# Patient Record
Sex: MALE | Race: WHITE | NOT HISPANIC OR LATINO | Employment: OTHER | URBAN - METROPOLITAN AREA
[De-identification: names, ages, dates, MRNs, and addresses within clinical notes are randomized per-mention and may not be internally consistent; named-entity substitution may affect disease eponyms.]

---

## 2017-01-04 RX ORDER — LISINOPRIL 5 MG/1
5 TABLET ORAL NIGHTLY
Qty: 90 TABLET | Refills: 1 | Status: SHIPPED | OUTPATIENT
Start: 2017-01-04 | End: 2017-06-28 | Stop reason: SDUPTHER

## 2017-01-04 NOTE — TELEPHONE ENCOUNTER
----- Message from aClly Ortega sent at 1/4/2017  9:26 AM CST -----  Refill: lisinopril (PRINIVIL,ZESTRIL) 5 MG tablet  Refill: ranitidine (ZANTAC) 300 MG tablet    Please send to [facility]. Thank you!

## 2017-01-13 ENCOUNTER — OFFICE VISIT (OUTPATIENT)
Dept: FAMILY MEDICINE | Facility: CLINIC | Age: 67
End: 2017-01-13
Payer: MEDICARE

## 2017-01-13 VITALS
RESPIRATION RATE: 18 BRPM | SYSTOLIC BLOOD PRESSURE: 90 MMHG | DIASTOLIC BLOOD PRESSURE: 76 MMHG | HEIGHT: 73 IN | WEIGHT: 315 LBS | OXYGEN SATURATION: 97 % | BODY MASS INDEX: 41.75 KG/M2 | HEART RATE: 50 BPM | TEMPERATURE: 98 F

## 2017-01-13 DIAGNOSIS — H81.13 BENIGN POSITIONAL VERTIGO, BILATERAL: Primary | ICD-10-CM

## 2017-01-13 DIAGNOSIS — I10 ESSENTIAL HYPERTENSION: ICD-10-CM

## 2017-01-13 PROCEDURE — 99214 OFFICE O/P EST MOD 30 MIN: CPT | Mod: S$PBB,,, | Performed by: FAMILY MEDICINE

## 2017-01-13 PROCEDURE — 99999 PR PBB SHADOW E&M-EST. PATIENT-LVL III: CPT | Mod: PBBFAC,,, | Performed by: FAMILY MEDICINE

## 2017-01-13 PROCEDURE — 99213 OFFICE O/P EST LOW 20 MIN: CPT | Mod: PBBFAC,PO | Performed by: FAMILY MEDICINE

## 2017-01-13 RX ORDER — NIACIN 500 MG/1
500 TABLET, EXTENDED RELEASE ORAL
COMMUNITY
End: 2019-09-19

## 2017-01-13 RX ORDER — HYDROCODONE BITARTRATE AND ACETAMINOPHEN 10; 325 MG/1; MG/1
1 TABLET ORAL
COMMUNITY
Start: 2016-06-03 | End: 2019-09-19

## 2017-01-13 RX ORDER — MECLIZINE HYDROCHLORIDE 25 MG/1
25 TABLET ORAL 3 TIMES DAILY PRN
Qty: 30 TABLET | Refills: 0 | Status: SHIPPED | OUTPATIENT
Start: 2017-01-13 | End: 2019-09-19

## 2017-01-13 RX ORDER — MELOXICAM 15 MG/1
TABLET ORAL
Refills: 2 | COMMUNITY
Start: 2016-12-15 | End: 2019-09-19

## 2017-01-13 RX ORDER — NEBIVOLOL 5 MG/1
5 TABLET ORAL
COMMUNITY
Start: 2013-03-07 | End: 2017-01-13 | Stop reason: ALTCHOICE

## 2017-01-13 NOTE — PROGRESS NOTES
Chief Complaint   Patient presents with    Dizziness    rt ear pain    Neck Pain    Headache       Wale Holiday Umesh is a 66 y.o. male who presents per the Chief Complaint.  Pt is known to me and was last seen by me on 9/23/2016.  All known chronic medical issues have been documented.       Dizziness:   Chronicity:  New  Onset:  In the past 7 days  Progression since onset:  Unchanged  Frequency:  Every few minutes  Pain Scale:  0/10  Severity:  Moderate  Duration:  5 minutes  Dizziness characteristics:  Motion-sickness, sensation of movement and walking on uneven surfaceno hearing loss, no ear congestion, no ear pain, no fever, no headaches, no tinnitus, no nausea, no vomiting, no diaphoresis, no aural fullness, no weakness, no visual disturbances, no light-headedness, no syncope, no palpitations, no panic, no facial weakness, no slurred speech, no numbness in extremities and no chest pain.  Aggravated by:  Position changes  Treatments tried:  Meclizine  Improvements on treatment:  Mildno strokes, no cardiac surgery, no neurologic disease, no head trauma, no balance testing, no ear trauma, no ear surgery, no head trauma, no ear infections, no anxiety, no ear tubes, no environmental allergies, no MRI head and no CT head.       ROS  Review of Systems   Constitutional: Negative.  Negative for activity change, appetite change, chills, diaphoresis, fatigue, fever and unexpected weight change.   HENT: Negative.  Negative for congestion, ear pain, hearing loss, nosebleeds, postnasal drip, rhinorrhea, sinus pressure, sneezing, sore throat, tinnitus and trouble swallowing.    Eyes: Negative for pain and visual disturbance.   Respiratory: Negative for cough, choking and shortness of breath.    Cardiovascular: Negative for chest pain, palpitations, leg swelling and syncope.   Gastrointestinal: Negative for abdominal pain, constipation, diarrhea, nausea and vomiting.   Genitourinary: Negative for difficulty urinating,  "dysuria, frequency and urgency.   Musculoskeletal: Negative.  Negative for arthralgias, back pain, gait problem, joint swelling and myalgias.   Skin: Negative.    Allergic/Immunologic: Negative for environmental allergies and food allergies.   Neurological: Positive for dizziness. Negative for seizures, syncope, weakness, light-headedness and headaches.   Psychiatric/Behavioral: Negative.  Negative for confusion, decreased concentration, dysphoric mood and sleep disturbance. The patient is not nervous/anxious.        Physical Exam  Vitals:    01/13/17 1405   BP: 90/76   Pulse: (!) 50   Resp: 18   Temp: 98 °F (36.7 °C)    Body mass index is 49.27 kg/(m^2).  Weight: (!) 169.4 kg (373 lb 7.4 oz)   Height: 6' 1" (185.4 cm)     Physical Exam   Constitutional: He is oriented to person, place, and time. He appears well-developed and well-nourished. He is active and cooperative.  Non-toxic appearance. He does not have a sickly appearance. He does not appear ill. No distress.   HENT:   Head: Normocephalic and atraumatic.   Right Ear: Hearing and external ear normal. No decreased hearing is noted.   Left Ear: Hearing and external ear normal. No decreased hearing is noted.   Nose: Nose normal. No rhinorrhea or nasal deformity.   Mouth/Throat: Uvula is midline and oropharynx is clear and moist. He does not have dentures. Normal dentition.   Eyes: Conjunctivae, EOM and lids are normal. Pupils are equal, round, and reactive to light. Right eye exhibits no chemosis, no discharge and no exudate. No foreign body present in the right eye. Left eye exhibits no chemosis, no discharge and no exudate. No foreign body present in the left eye. No scleral icterus.   Neck: Normal range of motion and full passive range of motion without pain. Neck supple.   Cardiovascular: Normal rate, regular rhythm, S1 normal, S2 normal and normal heart sounds.  Exam reveals no gallop and no friction rub.    No murmur heard.  Pulmonary/Chest: Effort normal " and breath sounds normal. No accessory muscle usage. No respiratory distress. He has no decreased breath sounds. He has no wheezes. He has no rhonchi. He has no rales.   Abdominal: Soft. Normal appearance. He exhibits no distension. There is no hepatosplenomegaly. There is no tenderness. There is no rigidity, no rebound and no guarding.   Musculoskeletal: Normal range of motion.   Neurological: He is alert and oriented to person, place, and time. He has normal strength. No cranial nerve deficit or sensory deficit. He exhibits normal muscle tone. He displays no seizure activity. Coordination and gait normal.   Skin: Skin is warm, dry and intact. No rash noted. He is not diaphoretic.   Psychiatric: He has a normal mood and affect. His speech is normal and behavior is normal. Judgment and thought content normal. Cognition and memory are normal. He is attentive.       Assessment & Plan    1. Benign positional vertigo, bilateral  Likely BPV; discussed continued use of meclizine for acute symptom relief and home therapy.  Gave handout explaining Dix-Hallpike-Epley maneuver to alleviate symptoms.   - meclizine (ANTIVERT) 25 mg tablet; Take 1 tablet (25 mg total) by mouth 3 (three) times daily as needed.  Dispense: 30 tablet; Refill: 0    2. Essential hypertension  Patient was counseled and encouraged to maintain a low sodium diet, as well as increasing physical activity.  Recommend random BP checks at home on a regular basis.  Will continue medication at this time, and follow up as recommended, or sooner if blood pressure is not well controlled.       Follow up documented    ACTIVE MEDICAL ISSUES:  Documented in Problem List    PAST MEDICAL HISTORY  Documented    PAST SURGICAL HISTORY:  Documented    SOCIAL HISTORY:  Documented    FAMILY HISTORY:  Documented    ALLERGIES AND MEDICATIONS: updated and reviewed.  Documented    Health Maintenance       Date Due Completion Date    TETANUS VACCINE 9/6/1969 ---    Influenza  Vaccine 8/1/2016 11/11/2014    Override on 11/11/2014: Done (today)    Pneumococcal (65+) (1 of 2 - PCV13) 9/6/2016 11/11/2014    Hemoglobin A1c 5/1/2017 11/1/2016    Override on 11/11/2014: Done (future)    Eye Exam 7/1/2017 7/1/2016    Override on 11/11/2014: Done (future)    Foot Exam 9/23/2017 9/23/2016    Override on 6/10/2015: Done (today)    Lipid Panel 12/29/2017 12/29/2016    Colonoscopy 11/10/2025 11/10/2015 (Done)    Override on 11/10/2015: Done    Override on 11/11/2014: Declined (had one done not due until couples more years)

## 2017-01-13 NOTE — LETTER
January 13, 2017    Wale Calvo  [address]  [place] [address]             [facility] [address] [place] [address]  Phone: [phone]  Fax: [phone] Wale Calvo  was seen in my clinic on 01/13/2017.  Please excuse his absence for massage due to illness.  Thank you.    If you have any questions or concerns, please don't hesitate to call.    Sincerely,             Azikiwe K. Lombard, MD

## 2017-02-01 ENCOUNTER — TELEPHONE (OUTPATIENT)
Dept: FAMILY MEDICINE | Facility: CLINIC | Age: 67
End: 2017-02-01

## 2017-02-01 NOTE — TELEPHONE ENCOUNTER
----- Message from Cinthya Lazcano sent at 2/1/2017 11:11 AM CST -----  Contact: self  Pt is requesting a refill for blood sugar diagnostic (CONTOUR NEXT STRIPS) Strp. [phone]

## 2017-02-01 NOTE — TELEPHONE ENCOUNTER
Patient stated that he was told by the pharmacy that his rx for his strips has  I called pharmacy to confirm since in 2016 patient was given 11 refills, per pharmacy a form need to be completed to send to insurance for approval, pharmacy will send form to be filled out. Patient notified

## 2017-02-13 ENCOUNTER — TELEPHONE (OUTPATIENT)
Dept: FAMILY MEDICINE | Facility: CLINIC | Age: 67
End: 2017-02-13

## 2017-02-13 NOTE — TELEPHONE ENCOUNTER
----- Message from Andrei Rivas sent at 2/13/2017  3:00 PM CST -----  Pt requesting call from Dr. Lombard wouldn't state why.

## 2017-02-13 NOTE — TELEPHONE ENCOUNTER
Patient stated that he his blowing green mucous and stated Dr.Lombard usually Rx a Zpak and a cortisone dago, patient requesting scripts, please advise

## 2017-02-16 ENCOUNTER — OFFICE VISIT (OUTPATIENT)
Dept: FAMILY MEDICINE | Facility: CLINIC | Age: 67
End: 2017-02-16
Payer: MEDICARE

## 2017-02-16 VITALS
DIASTOLIC BLOOD PRESSURE: 64 MMHG | BODY MASS INDEX: 41.75 KG/M2 | WEIGHT: 315 LBS | SYSTOLIC BLOOD PRESSURE: 108 MMHG | RESPIRATION RATE: 18 BRPM | HEART RATE: 90 BPM | TEMPERATURE: 98 F | OXYGEN SATURATION: 96 % | HEIGHT: 73 IN

## 2017-02-16 DIAGNOSIS — J18.9 ATYPICAL PNEUMONIA: Primary | ICD-10-CM

## 2017-02-16 PROCEDURE — 99213 OFFICE O/P EST LOW 20 MIN: CPT | Mod: S$PBB,,, | Performed by: FAMILY MEDICINE

## 2017-02-16 PROCEDURE — 99999 PR PBB SHADOW E&M-EST. PATIENT-LVL III: CPT | Mod: PBBFAC,,, | Performed by: FAMILY MEDICINE

## 2017-02-16 PROCEDURE — 99213 OFFICE O/P EST LOW 20 MIN: CPT | Mod: PBBFAC,PO | Performed by: FAMILY MEDICINE

## 2017-02-16 RX ORDER — METHYLPREDNISOLONE 4 MG/1
TABLET ORAL
Qty: 1 PACKAGE | Refills: 0 | Status: SHIPPED | OUTPATIENT
Start: 2017-02-16 | End: 2018-03-20 | Stop reason: ALTCHOICE

## 2017-02-16 RX ORDER — AZITHROMYCIN 250 MG/1
TABLET, FILM COATED ORAL
Qty: 6 TABLET | Refills: 0 | Status: SHIPPED | OUTPATIENT
Start: 2017-02-16 | End: 2017-06-28 | Stop reason: ALTCHOICE

## 2017-02-16 NOTE — PROGRESS NOTES
Chief Complaint   Patient presents with    Cough    URI       Wale Holkp Calvo is a 66 y.o. male who presents per the Chief Complaint.  Pt is known to me and was last seen by me on 1/13/2017.  All known chronic medical issues have been documented.       Cough   This is a new problem. The current episode started more than 1 month ago. The problem has been unchanged. The problem occurs every few minutes. The cough is productive of sputum. Associated symptoms include chills, a fever, postnasal drip, rhinorrhea, a sore throat and shortness of breath. Pertinent negatives include no chest pain, ear congestion, ear pain, eye redness, headaches, heartburn, hemoptysis, myalgias, nasal congestion, rash, sweats, weight loss or wheezing. Nothing aggravates the symptoms. He has tried OTC cough suppressant for the symptoms. There is no history of asthma, bronchiectasis, bronchitis, COPD, emphysema, environmental allergies or pneumonia.   URI    This is a new problem. The current episode started more than 1 month ago. The problem has been unchanged. The maximum temperature recorded prior to his arrival was 100.4 - 100.9 F. Associated symptoms include congestion, coughing, rhinorrhea and a sore throat. Pertinent negatives include no abdominal pain, chest pain, diarrhea, dysuria, ear pain, headaches, joint pain, joint swelling, nausea, neck pain, plugged ear sensation, rash, sinus pain, sneezing, swollen glands, vomiting or wheezing. He has tried acetaminophen, antihistamine and NSAIDs for the symptoms. The treatment provided mild relief.        ROS  Review of Systems   Constitutional: Positive for chills and fever. Negative for weight loss.   HENT: Positive for congestion, postnasal drip, rhinorrhea and sore throat. Negative for dental problem, drooling, ear discharge, ear pain, facial swelling, hearing loss, mouth sores, sinus pressure, sneezing and trouble swallowing.    Eyes: Negative for pain, discharge and redness.  "  Respiratory: Positive for cough and shortness of breath. Negative for hemoptysis, chest tightness and wheezing.    Cardiovascular: Negative for chest pain.   Gastrointestinal: Negative for abdominal pain, constipation, diarrhea, heartburn, nausea and vomiting.   Genitourinary: Negative for difficulty urinating, dysuria, flank pain and urgency.   Musculoskeletal: Negative.  Negative for arthralgias, joint pain, myalgias, neck pain and neck stiffness.   Skin: Negative.  Negative for rash.   Allergic/Immunologic: Negative for environmental allergies, food allergies and immunocompromised state.   Neurological: Negative.  Negative for dizziness, weakness, light-headedness and headaches.   Psychiatric/Behavioral: Positive for sleep disturbance.       Physical Exam  Vitals:    02/16/17 1046   BP: 108/64   Pulse: 90   Resp: 18   Temp: 98.2 °F (36.8 °C)    Body mass index is 49.01 kg/(m^2).  Weight: (!) 168.5 kg (371 lb 7.6 oz)   Height: 6' 1" (185.4 cm)     Physical Exam   Constitutional: He appears well-developed and well-nourished. He is cooperative.  Non-toxic appearance. He does not have a sickly appearance. He does not appear ill. No distress.   HENT:   Head: Normocephalic and atraumatic.   Right Ear: Hearing, external ear and ear canal normal. No tenderness. No foreign bodies. No mastoid tenderness. Tympanic membrane is bulging. Tympanic membrane is not injected, not scarred, not perforated, not erythematous and not retracted. No decreased hearing is noted.   Left Ear: Hearing, external ear and ear canal normal. No tenderness. No foreign bodies. No mastoid tenderness. Tympanic membrane is bulging. Tympanic membrane is not injected, not scarred, not perforated, not erythematous and not retracted. No decreased hearing is noted.   Nose: Mucosal edema and rhinorrhea present. No nose lacerations, sinus tenderness, nasal deformity, septal deviation or nasal septal hematoma. No epistaxis. Right sinus exhibits no maxillary " sinus tenderness and no frontal sinus tenderness. Left sinus exhibits no maxillary sinus tenderness and no frontal sinus tenderness.   Mouth/Throat: Uvula is midline and mucous membranes are normal. He does not have dentures. No oral lesions. No dental abscesses or dental caries. Posterior oropharyngeal erythema present. No oropharyngeal exudate, posterior oropharyngeal edema or tonsillar abscesses.   Eyes: Conjunctivae and EOM are normal. Pupils are equal, round, and reactive to light. Right eye exhibits no chemosis, no discharge and no exudate. No foreign body present in the right eye. Left eye exhibits no chemosis, no discharge and no exudate. No foreign body present in the left eye. No scleral icterus.   Neck: Normal range of motion and full passive range of motion without pain. No rigidity.   Cardiovascular: Normal rate, regular rhythm, S1 normal, S2 normal and normal heart sounds.  Exam reveals no gallop and no friction rub.    No murmur heard.  Pulmonary/Chest: Effort normal and breath sounds normal. He has no decreased breath sounds. He has no wheezes. He has no rhonchi. He has no rales.   Lymphadenopathy:        Head (right side): No submental, no submandibular, no tonsillar, no preauricular and no posterior auricular adenopathy present.        Head (left side): No submental, no submandibular, no tonsillar, no preauricular and no posterior auricular adenopathy present.   Neurological: He is alert.       Assessment & Plan    1. Atypical pneumonia  Prolonged URI symptoms not resolved with OTC treatment.  Will start oral steroid and short course of antibiotics for symptom relief.  - azithromycin (Z-CRISTO) 250 MG tablet; Take 2 tablets by mouth on day 1; Take 1 tablet by mouth on days 2-5  Dispense: 6 tablet; Refill: 0  - methylPREDNISolone (MEDROL DOSEPACK) 4 mg tablet; use as directed  Dispense: 1 Package; Refill: 0      Follow up documented    ACTIVE MEDICAL ISSUES:  Documented in Problem List    PAST MEDICAL  HISTORY  Documented    PAST SURGICAL HISTORY:  Documented    SOCIAL HISTORY:  Documented    FAMILY HISTORY:  Documented    ALLERGIES AND MEDICATIONS: updated and reviewed.  Documented    Health Maintenance       Date Due Completion Date    TETANUS VACCINE 9/6/1969 ---    Influenza Vaccine 8/1/2016 11/11/2014    Override on 11/11/2014: Done (today)    Pneumococcal (65+) (1 of 2 - PCV13) 9/6/2016 11/11/2014    Hemoglobin A1c 5/1/2017 11/1/2016    Override on 11/11/2014: Done (future)    Eye Exam 7/1/2017 7/1/2016    Override on 11/11/2014: Done (future)    Foot Exam 9/23/2017 9/23/2016    Override on 6/10/2015: Done (today)    Lipid Panel 12/29/2017 12/29/2016    Colonoscopy 11/10/2025 11/10/2015 (Done)    Override on 11/10/2015: Done    Override on 11/11/2014: Declined (had one done not due until couples more years)

## 2017-03-08 ENCOUNTER — TELEPHONE (OUTPATIENT)
Dept: FAMILY MEDICINE | Facility: CLINIC | Age: 67
End: 2017-03-08

## 2017-03-08 NOTE — TELEPHONE ENCOUNTER
Informed Ayesha/[facility] that the form has been received in the office, but is not yet signed.  Will have Dr. Lombard sign so that it may be re-faxed.      Ann requesting to have a copy faxed to the local store and Medicare office.

## 2017-03-08 NOTE — TELEPHONE ENCOUNTER
----- Message from Quinton Avelar sent at 3/8/2017 10:21 AM CST -----  Contact: Ayesha/[facility]/[phone]  Ayesha would like to know if the staff received a CMN Form regarding the patient's diabetic supplies. Thank you.

## 2017-03-10 ENCOUNTER — TELEPHONE (OUTPATIENT)
Dept: FAMILY MEDICINE | Facility: CLINIC | Age: 67
End: 2017-03-10

## 2017-03-10 NOTE — TELEPHONE ENCOUNTER
----- Message from Clare Marinelli sent at 3/10/2017  8:21 AM CST -----  Contact: Pharmacy  Pt is completely out of diabetic supplies. Please fill out CMN form and send to pharmacy

## 2017-03-29 ENCOUNTER — DOCUMENTATION ONLY (OUTPATIENT)
Dept: RESEARCH | Facility: HOSPITAL | Age: 67
End: 2017-03-29

## 2017-03-29 NOTE — RESEARCH
Documentation of Informed Consent Obtained for Research by Non-[facility] Personnel    Study: PROmoting Successful Weight Loss in Primary CarE in [place] (PROP)  IRB Number: #[id number]; [facility] IRB ID: [id number]  : Reid Escalante, PhD.  Coordinating Site: [facility] Co-Investigators: Corina Saldaña MD and Luis Dukes MD  [facility]: 11[facility]/15  This study is reviewed and acknowledged by the [facility] IRB. The IRB of Record is the [facility] ([facility]) IRB.    Is the volunteer currently enrolled in any other research trials (per Epic)? [ ] Yes  [X] No  [facility] staff administering informed consent: Floridalma Tran  Date:   2/13/2017  Does the volunteer agree to participate in the trial? [X] Yes  [ ] No  If no, document the reason here:       [X] I acknowledge that I have reviewed the informed consent form and the volunteer signed and dated the signature section of the consent forms.    Name: ([facility] personnel reviewing consent form):           Eugene Massey  Review and Scan Date (if different than date of note): 3/22/17, 3/29/2017  Comments: See  for Consent Forms    [facility]-trained recruiters will obtain informed consent form all participants as well as HIPAA authorization.  Study protocol states all questions and concerns are clarified before any forms are signed. The following elements of the informed consent document were to be discussed:  · What you should know about a research study (e.g. purpose of the consent form; right to refuse or agree and change your mind later; participation is voluntary)?  · Who is doing the study?    · Where is the study being conducted?    · What is the purpose of this study?  · Who is eligible to participate in the study?     · What will happen to you if you take part in the study (e.g. Screening, Measurement visits, Lifestyle change meetings activities)?  · What  are the possible risks and discomforts? Benefits?  · If you do not want to take part in the study, are there other choices?  · If you have any questions or problems, whom can you call?  · What information will be kept private?  · Can your taking part in the study end early?  · What if information becomes available that might affect your decision to stay in the study?  · What charges will you have to pay? What payment will you receive?  · Will you be compensated for a study-related injury or medical illness?

## 2017-05-17 NOTE — TELEPHONE ENCOUNTER
----- Message from Hoa Kathleen sent at 5/17/2017  1:48 PM CDT -----  Contact: self  REFILL: ranitidine (ZANTAC) 300 MG tablet    PLEASE SEND TO [facility] IN [place], [place]  [phone](PHONE)

## 2017-05-26 DIAGNOSIS — E11.9 TYPE 2 DIABETES MELLITUS WITHOUT COMPLICATION: ICD-10-CM

## 2017-06-28 ENCOUNTER — OFFICE VISIT (OUTPATIENT)
Dept: FAMILY MEDICINE | Facility: CLINIC | Age: 67
End: 2017-06-28
Payer: MEDICARE

## 2017-06-28 ENCOUNTER — LAB VISIT (OUTPATIENT)
Dept: LAB | Facility: HOSPITAL | Age: 67
End: 2017-06-28
Attending: FAMILY MEDICINE
Payer: MEDICARE

## 2017-06-28 VITALS
OXYGEN SATURATION: 97 % | BODY MASS INDEX: 41.75 KG/M2 | HEART RATE: 52 BPM | WEIGHT: 315 LBS | HEIGHT: 73 IN | SYSTOLIC BLOOD PRESSURE: 108 MMHG | RESPIRATION RATE: 17 BRPM | DIASTOLIC BLOOD PRESSURE: 76 MMHG | TEMPERATURE: 98 F

## 2017-06-28 DIAGNOSIS — I25.10 CORONARY ARTERY DISEASE INVOLVING NATIVE CORONARY ARTERY WITHOUT ANGINA PECTORIS, UNSPECIFIED WHETHER NATIVE OR TRANSPLANTED HEART: ICD-10-CM

## 2017-06-28 DIAGNOSIS — Z23 NEED FOR PNEUMOCOCCAL VACCINATION: ICD-10-CM

## 2017-06-28 DIAGNOSIS — E66.01 MORBID OBESITY WITH BMI OF 40.0-44.9, ADULT: ICD-10-CM

## 2017-06-28 DIAGNOSIS — I10 ESSENTIAL HYPERTENSION: ICD-10-CM

## 2017-06-28 DIAGNOSIS — E11.9 TYPE 2 DIABETES MELLITUS WITHOUT COMPLICATION, WITHOUT LONG-TERM CURRENT USE OF INSULIN: Primary | ICD-10-CM

## 2017-06-28 DIAGNOSIS — E78.00 PURE HYPERCHOLESTEROLEMIA: ICD-10-CM

## 2017-06-28 DIAGNOSIS — E11.9 TYPE 2 DIABETES MELLITUS WITHOUT COMPLICATION: ICD-10-CM

## 2017-06-28 PROCEDURE — 99999 PR PBB SHADOW E&M-EST. PATIENT-LVL III: CPT | Mod: PBBFAC,,, | Performed by: FAMILY MEDICINE

## 2017-06-28 PROCEDURE — 36415 COLL VENOUS BLD VENIPUNCTURE: CPT | Mod: PO

## 2017-06-28 PROCEDURE — 83036 HEMOGLOBIN GLYCOSYLATED A1C: CPT

## 2017-06-28 PROCEDURE — 99214 OFFICE O/P EST MOD 30 MIN: CPT | Mod: S$PBB,,, | Performed by: FAMILY MEDICINE

## 2017-06-28 PROCEDURE — 4010F ACE/ARB THERAPY RXD/TAKEN: CPT | Mod: ,,, | Performed by: FAMILY MEDICINE

## 2017-06-28 PROCEDURE — 3044F HG A1C LEVEL LT 7.0%: CPT | Mod: ,,, | Performed by: FAMILY MEDICINE

## 2017-06-28 RX ORDER — LISINOPRIL 5 MG/1
5 TABLET ORAL NIGHTLY
Qty: 90 TABLET | Refills: 1 | Status: SHIPPED | OUTPATIENT
Start: 2017-06-28 | End: 2018-03-12 | Stop reason: SDUPTHER

## 2017-06-28 RX ORDER — ATORVASTATIN CALCIUM 40 MG/1
40 TABLET, FILM COATED ORAL NIGHTLY
Qty: 90 TABLET | Refills: 1 | Status: SHIPPED | OUTPATIENT
Start: 2017-06-28 | End: 2018-02-20 | Stop reason: SDUPTHER

## 2017-06-28 RX ORDER — CLOPIDOGREL BISULFATE 75 MG/1
75 TABLET ORAL DAILY
Qty: 90 TABLET | Refills: 1 | Status: SHIPPED | OUTPATIENT
Start: 2017-06-28 | End: 2018-03-20 | Stop reason: SDUPTHER

## 2017-06-28 NOTE — PROGRESS NOTES
No chief complaint on file.      Wale Calvo is a 66 y.o. male who presents per the Chief Complaint.  Pt is known to me and was last seen by me on 2/16/2017.  All known chronic medical issues have been documented.       Diabetes   He presents for his follow-up diabetic visit. He has gestational diabetes mellitus. No MedicAlert identification noted. The initial diagnosis of diabetes was made 5 years ago. His disease course has been stable. There are no hypoglycemic associated symptoms. Pertinent negatives for hypoglycemia include no confusion, dizziness, headaches, hunger, nervousness/anxiousness, pallor, seizures, sleepiness, sweats or tremors. Associated symptoms include foot paresthesias and weight loss. Pertinent negatives for diabetes include no blurred vision, no chest pain, no fatigue, no foot ulcerations, no polydipsia, no polyphagia, no polyuria, no visual change and no weakness. There are no hypoglycemic complications. Symptoms are stable. Diabetic complications include peripheral neuropathy. Pertinent negatives for diabetic complications include no autonomic neuropathy, CVA, heart disease, impotence, nephropathy, PVD or retinopathy. Risk factors for coronary artery disease include diabetes mellitus. Current diabetic treatment includes oral agent (dual therapy). He is compliant with treatment all of the time. His weight is decreasing rapidly. He is following a generally healthy diet. When asked about meal planning, he reported none. He has had a previous visit with a dietitian. He participates in exercise three times a week. He monitors blood glucose at home 1-2 x per day. Blood glucose monitoring compliance is excellent. His home blood glucose trend is decreasing steadily. An ACE inhibitor/angiotensin II receptor blocker is being taken. He does not see a podiatrist.Eye exam is current.   Hypertension   This is a chronic problem. The current episode started more than 1 year ago. The problem is  unchanged. The problem is controlled. Pertinent negatives include no anxiety, blurred vision, chest pain, headaches, malaise/fatigue, neck pain, orthopnea, palpitations, peripheral edema, PND, shortness of breath or sweats. There are no associated agents to hypertension. Risk factors for coronary artery disease include diabetes mellitus, dyslipidemia, male gender and obesity. Past treatments include calcium channel blockers and beta blockers. The current treatment provides moderate improvement. Compliance problems include exercise and diet.  There is no history of angina, kidney disease, CAD/MI, CVA, heart failure, left ventricular hypertrophy, PVD, renovascular disease, retinopathy or a thyroid problem. There is no history of chronic renal disease, coarctation of the aorta, hyperaldosteronism, hyperparathyroidism, a hypertension causing med, pheochromocytoma or sleep apnea.        ROS  Review of Systems   Constitutional: Positive for weight loss. Negative for activity change, appetite change, chills, diaphoresis, fatigue, fever, malaise/fatigue and unexpected weight change.   HENT: Negative.  Negative for congestion, ear pain, hearing loss, nosebleeds, postnasal drip, rhinorrhea, sinus pressure, sneezing, sore throat and trouble swallowing.    Eyes: Negative for blurred vision, pain and visual disturbance.   Respiratory: Negative for cough, choking and shortness of breath.    Cardiovascular: Negative for chest pain, palpitations, orthopnea, leg swelling and PND.   Gastrointestinal: Negative for abdominal pain, constipation, diarrhea, nausea and vomiting.   Endocrine: Negative for polydipsia, polyphagia and polyuria.   Genitourinary: Negative for difficulty urinating, dysuria, frequency, impotence and urgency.   Musculoskeletal: Negative.  Negative for arthralgias, back pain, gait problem, joint swelling, myalgias and neck pain.   Skin: Negative.  Negative for pallor.   Allergic/Immunologic: Negative for  "environmental allergies and food allergies.   Neurological: Negative.  Negative for dizziness, tremors, seizures, syncope, weakness, light-headedness and headaches.   Psychiatric/Behavioral: Negative.  Negative for confusion, decreased concentration, dysphoric mood and sleep disturbance. The patient is not nervous/anxious.        Physical Exam  Vitals:    06/28/17 1034   BP: 108/76   Pulse: (!) 52   Resp: 17   Temp: 97.9 °F (36.6 °C)    Body mass index is 44.24 kg/m².  Weight: (!) 152.1 kg (335 lb 5.1 oz)   Height: 6' 1" (185.4 cm)     Physical Exam   Constitutional: He is oriented to person, place, and time. He appears well-developed and well-nourished. He is active and cooperative.  Non-toxic appearance. He does not have a sickly appearance. He does not appear ill. No distress.   HENT:   Head: Normocephalic and atraumatic.   Right Ear: Hearing and external ear normal. No decreased hearing is noted.   Left Ear: Hearing and external ear normal. No decreased hearing is noted.   Nose: Nose normal. No rhinorrhea or nasal deformity.   Mouth/Throat: Uvula is midline and oropharynx is clear and moist. He does not have dentures. Normal dentition.   Eyes: Conjunctivae, EOM and lids are normal. Pupils are equal, round, and reactive to light. Right eye exhibits no chemosis, no discharge and no exudate. No foreign body present in the right eye. Left eye exhibits no chemosis, no discharge and no exudate. No foreign body present in the left eye. No scleral icterus.   Neck: Normal range of motion and full passive range of motion without pain. Neck supple.   Cardiovascular: Normal rate, regular rhythm, S1 normal, S2 normal and normal heart sounds.  Exam reveals no gallop and no friction rub.    No murmur heard.  Pulmonary/Chest: Effort normal and breath sounds normal. No accessory muscle usage. No respiratory distress. He has no decreased breath sounds. He has no wheezes. He has no rhonchi. He has no rales.   Abdominal: Soft. " Normal appearance. He exhibits no distension. There is no hepatosplenomegaly. There is no tenderness. There is no rigidity, no rebound and no guarding.   Musculoskeletal: Normal range of motion.   Neurological: He is alert and oriented to person, place, and time. He has normal strength. No cranial nerve deficit or sensory deficit. He exhibits normal muscle tone. He displays no seizure activity. Coordination and gait normal.   Skin: Skin is warm, dry and intact. No rash noted. He is not diaphoretic.   Psychiatric: He has a normal mood and affect. His speech is normal and behavior is normal. Judgment and thought content normal. Cognition and memory are normal. He is attentive.       Assessment & Plan    1. Type 2 diabetes mellitus without complication, without long-term current use of insulin  Patient is encouraged to continue a diet low in carbohydrates and simple sugars.  Advised to begin or continue a weight loss program which focuses on good food choices and increased physical activity and encouraged to adhere to medication regimen and check glucose as recommended daily and report any changes in usual trends.  Screening blood test is due at this time.  Foot exam was not done at this visit.  Patient has been controlling glucose levels with diet and weight.    2. Coronary artery disease involving native coronary artery without angina pectoris, unspecified whether native or transplanted heart  Stable; no therapeutic changes at this time.  - clopidogrel (PLAVIX) 75 mg tablet; Take 1 tablet (75 mg total) by mouth once daily.  Dispense: 90 tablet; Refill: 1    3. Essential hypertension  Patient was counseled and encouraged to maintain a low sodium diet, as well as increasing physical activity.  Recommend random BP checks at home on a regular basis.  Repeat BP at end of visit was not necessary. Will continue medication at this time, and follow up in 3 months, or sooner if blood pressure is not well controlled.     -  lisinopril (PRINIVIL,ZESTRIL) 5 MG tablet; Take 1 tablet (5 mg total) by mouth every evening.  Dispense: 90 tablet; Refill: 1    4. Pure hypercholesterolemia  We discussed ways to manage cholesterol levels, including increasing whole grain foods and decreasing fried and fatty foods.  I also recommended OTC Omega 3 and Omega 6 supplements to improve overall cholesterol levels.  Will continue current therapy at this visit; patient is not due for screening blood test at this time.   - atorvastatin (LIPITOR) 40 MG tablet; Take 1 tablet (40 mg total) by mouth every evening.  Dispense: 90 tablet; Refill: 1    5. Morbid obesity with BMI of 40.0-44.9, adult  We discussed weight and safe, effective ways of losing pounds, including low carbohydrate, low fat diet and increasing physical activity to improve cardiovascular performance and increase metabolism.  Patient was encouraged to set realistic attainable goals for weight loss, and we will follow up periodically.  Is     6. Need for pneumococcal vaccination  Patient due for pneumonia vaccine; Patient agreed and vaccine was administered in clinic today without complications.   - Pneumococcal Conjugate Vaccine (13 Valent) (IM)      Follow up documented    ACTIVE MEDICAL ISSUES:  Documented in Problem List    PAST MEDICAL HISTORY  Documented    PAST SURGICAL HISTORY:  Documented    SOCIAL HISTORY:  Documented    FAMILY HISTORY:  Documented    ALLERGIES AND MEDICATIONS: updated and reviewed.  Documented    Health Maintenance       Date Due Completion Date    TETANUS VACCINE 09/06/1969 ---    Pneumococcal (65+) (1 of 2 - PCV13) 09/06/2016 11/11/2014    Hemoglobin A1c 05/01/2017 11/1/2016    Override on 11/11/2014: Done (future)    Eye Exam 07/01/2017 7/1/2016    Override on 11/11/2014: Done (future)    Influenza Vaccine 08/01/2017 11/11/2014    Override on 11/11/2014: Done (today)    Foot Exam 09/23/2017 9/23/2016    Override on 6/10/2015: Done (today)    Lipid Panel 12/29/2017  12/29/2016    Colonoscopy 11/10/2025 11/10/2015 (Done)    Override on 11/10/2015: Done    Override on 11/11/2014: Declined (had one done not due until couples more years)

## 2017-06-29 LAB
ESTIMATED AVG GLUCOSE: 108 MG/DL
HBA1C MFR BLD HPLC: 5.4 %

## 2017-07-04 DIAGNOSIS — E78.5 HYPERLIPIDEMIA, UNSPECIFIED HYPERLIPIDEMIA TYPE: ICD-10-CM

## 2017-07-06 RX ORDER — CLOPIDOGREL BISULFATE 75 MG/1
TABLET ORAL
Qty: 90 TABLET | Refills: 0 | Status: SHIPPED | OUTPATIENT
Start: 2017-07-06 | End: 2018-03-20 | Stop reason: SDUPTHER

## 2017-08-16 ENCOUNTER — HOSPITAL ENCOUNTER (EMERGENCY)
Facility: HOSPITAL | Age: 67
Discharge: HOME | End: 2017-08-17
Attending: EMERGENCY MEDICINE
Payer: MEDICARE

## 2017-08-16 DIAGNOSIS — Z23 NEED FOR TETANUS BOOSTER: ICD-10-CM

## 2017-08-16 DIAGNOSIS — S61.219A LACERATION OF FINGER: Primary | ICD-10-CM

## 2017-08-16 PROCEDURE — 12042 INTMD RPR N-HF/GENIT2.6-7.5: CPT

## 2017-08-16 PROCEDURE — 99283 EMERGENCY DEPT VISIT LOW MDM: CPT | Mod: 25

## 2017-08-16 RX ORDER — LIDOCAINE HYDROCHLORIDE 10 MG/ML
10 INJECTION INFILTRATION; PERINEURAL
Status: COMPLETED | OUTPATIENT
Start: 2017-08-17 | End: 2017-08-17

## 2017-08-16 RX ORDER — HYDROCODONE BITARTRATE AND ACETAMINOPHEN 5; 325 MG/1; MG/1
1 TABLET ORAL
Status: COMPLETED | OUTPATIENT
Start: 2017-08-17 | End: 2017-08-17

## 2017-08-17 VITALS
TEMPERATURE: 98 F | BODY MASS INDEX: 41.35 KG/M2 | DIASTOLIC BLOOD PRESSURE: 68 MMHG | HEART RATE: 52 BPM | OXYGEN SATURATION: 98 % | RESPIRATION RATE: 18 BRPM | WEIGHT: 312 LBS | HEIGHT: 73 IN | SYSTOLIC BLOOD PRESSURE: 134 MMHG

## 2017-08-17 PROCEDURE — 90715 TDAP VACCINE 7 YRS/> IM: CPT | Performed by: NURSE PRACTITIONER

## 2017-08-17 PROCEDURE — 90471 IMMUNIZATION ADMIN: CPT | Performed by: NURSE PRACTITIONER

## 2017-08-17 PROCEDURE — 63600175 PHARM REV CODE 636 W HCPCS: Performed by: NURSE PRACTITIONER

## 2017-08-17 PROCEDURE — 25000003 PHARM REV CODE 250: Performed by: NURSE PRACTITIONER

## 2017-08-17 RX ADMIN — BACITRACIN, NEOMYCIN, POLYMYXIN B 1 EACH: 400; 3.5; 5 OINTMENT TOPICAL at 01:08

## 2017-08-17 RX ADMIN — CLOSTRIDIUM TETANI TOXOID ANTIGEN (FORMALDEHYDE INACTIVATED), CORYNEBACTERIUM DIPHTHERIAE TOXOID ANTIGEN (FORMALDEHYDE INACTIVATED), BORDETELLA PERTUSSIS TOXOID ANTIGEN (GLUTARALDEHYDE INACTIVATED), BORDETELLA PERTUSSIS FILAMENTOUS HEMAGGLUTININ ANTIGEN (FORMALDEHYDE INACTIVATED), BORDETELLA PERTUSSIS PERTACTIN ANTIGEN, AND BORDETELLA PERTUSSIS FIMBRIAE 2/3 ANTIGEN 0.5 ML: 5; 2; 2.5; 5; 3; 5 INJECTION, SUSPENSION INTRAMUSCULAR at 12:08

## 2017-08-17 RX ADMIN — HYDROCODONE BITARTRATE AND ACETAMINOPHEN 1 TABLET: 5; 325 TABLET ORAL at 01:08

## 2017-08-17 RX ADMIN — LIDOCAINE HYDROCHLORIDE 10 ML: 10 INJECTION, SOLUTION INFILTRATION; PERINEURAL at 12:08

## 2017-08-17 NOTE — DISCHARGE INSTRUCTIONS
Please keep your wound clean and dry.  Wash gently with soap and water and apply antibiotic ointment (bacitracin, neosporin, etc.) over the wound after washing. Please watch for signs of infection including: increased\spreading redness, swelling, pus-like discharge, or a fever greater than 100.4F. If you experience any of these, please contact your Primary Care Doctor or Return to the Emergency Department for a wound check.     Please follow up with your Primary Care Doctor in 7-10 days for wound recheck and suture removal.  You may return to the Emergency Department if you are unable to see your Primary Care Doctor.  Please return to the ER for any new or worsening symptoms.    Please use the splint to help keep the finger straight which will help with healing.

## 2017-08-17 NOTE — ED NOTES
Dressed patient laceration with 2x2 and antibiotics, wrapped with coban per NP. Placed a white finger splint on left 1st digit of hand. Tolerated well

## 2017-08-17 NOTE — ED TRIAGE NOTES
Pt  Was using machete trimming cabanna and cut the left index finger.  Pt used peroxide on the wound before coming to ER

## 2017-08-17 NOTE — ED PROVIDER NOTES
Encounter Date: 8/16/2017    SCRIBE #1 NOTE: I, Keysha Corral, am scribing for, and in the presence of, KEISHA Keenan. Other sections scribed: HPI/ROS.       History     Chief Complaint   Patient presents with    Laceration     Pt accidentially cut left hand of index finger with a machete.  Bleeding is controlled, pt is on Plavix.     CC: Laceration    Pt is a 66 y.o. male w/ DM II, HLD, HTN, CAD s/p stent placement, GERD, ED, and nuclear sclerosis of both eyes presenting to the ED for evaluation following an accidental laceration to the L index finger with a machete around 2000. Pt is on Plavix. Pt is unsure of his last tetanus shot. Pt soaked it with H2O2 and rinsed with H2O. Pt is R hand dominant.  No other treatment attempted prior to arrival. Pt reports no further symptoms.        The history is provided by the patient.     Review of patient's allergies indicates:   Allergen Reactions    Penicillins Anaphylaxis     Past Medical History:   Diagnosis Date    Coronary artery disease     Diabetes mellitus type II     ED (erectile dysfunction)     GERD (gastroesophageal reflux disease)     Hyperlipidemia     Hypertension     Nuclear sclerosis of both eyes 7/1/2016    Obesity      Past Surgical History:   Procedure Laterality Date    CATARACT EXTRACTION W/  INTRAOCULAR LENS IMPLANT Left 08/16/2016    Dr. Martinez    CATARACT EXTRACTION W/  INTRAOCULAR LENS IMPLANT Right 08/30/2016    Dr. Martinez    CORONARY STENT PLACEMENT      JOINT REPLACEMENT      both knees    SPINE SURGERY      discectomy     Family History   Problem Relation Age of Onset    Dementia Mother     Cataracts Mother     Heart disease Father     Alcohol abuse Brother     Drug abuse Daughter     No Known Problems Son     No Known Problems Daughter     No Known Problems Son     No Known Problems Sister     No Known Problems Maternal Aunt     No Known Problems Maternal Uncle     No Known Problems Paternal Aunt     No  Known Problems Paternal Uncle     No Known Problems Maternal Grandmother     No Known Problems Maternal Grandfather     No Known Problems Paternal Grandmother     No Known Problems Paternal Grandfather     Amblyopia Neg Hx     Blindness Neg Hx     Cancer Neg Hx     Diabetes Neg Hx     Glaucoma Neg Hx     Hypertension Neg Hx     Macular degeneration Neg Hx     Retinal detachment Neg Hx     Strabismus Neg Hx     Stroke Neg Hx     Thyroid disease Neg Hx      Social History   Substance Use Topics    Smoking status: Never Smoker    Smokeless tobacco: Never Used    Alcohol use No     Review of Systems   Constitutional: Negative for chills and fever.   HENT: Negative for trouble swallowing.    Eyes: Negative for redness.   Respiratory: Negative for shortness of breath.    Cardiovascular: Negative for chest pain.   Gastrointestinal: Negative for vomiting.   Genitourinary: Negative for dysuria and hematuria.   Musculoskeletal: Negative for arthralgias and myalgias.   Skin: Positive for wound (laceration). Negative for color change and rash.   Psychiatric/Behavioral: Negative for confusion.       Physical Exam     Initial Vitals [08/16/17 2123]   BP Pulse Resp Temp SpO2   (!) 144/67 70 20 98.5 °F (36.9 °C) --      MAP       92.67         Physical Exam    Nursing note and vitals reviewed.  Constitutional: He appears well-developed and well-nourished. He is not diaphoretic. He is cooperative.  Non-toxic appearance. No distress.   HENT:   Head: Normocephalic and atraumatic.   Right Ear: External ear normal.   Left Ear: External ear normal.   Mouth/Throat: Oropharynx is clear and moist.   Eyes: Conjunctivae and EOM are normal.   Neck: Normal range of motion.   Cardiovascular: Normal rate, regular rhythm and intact distal pulses.   Pulses:       Radial pulses are 2+ on the right side, and 2+ on the left side.   Musculoskeletal:        Left hand: Left index finger: Exhibits bleeding (well controlled). Injuries:  laceration (radial aspect). Motor /Testing: Wrist Extension: 5/5. Wrist Flexion: 5/5. : 5/5. Index Finger: 4/5 (2/2 pain). Middle Finger: 5/5. Ring Finger: 5/5. Little Finger: 5/5. Thumb APB: 5/5. Thumb FPL: 5/5. Pinch Mechanism: 5/5.   Neurological: He is alert and oriented to person, place, and time. GCS eye subscore is 4. GCS verbal subscore is 5. GCS motor subscore is 6.   Skin: Skin is warm and dry. Capillary refill takes less than 2 seconds. Laceration noted. No rash noted.   Psychiatric: He has a normal mood and affect. His behavior is normal. Judgment and thought content normal.         ED Course   Lac Repair  Date/Time: 8/17/2017 1:25 AM  Performed by: YUMI BARKSDALE  Authorized by: RONALDO SANCHEZ   Consent Done: Yes  Consent: Verbal consent obtained.  Risks and benefits: risks, benefits and alternatives were discussed  Consent given by: patient  Patient identity confirmed: verbally with patient  Body area: upper extremity  Location details: left index finger  Laceration length: 3 cm  Foreign bodies: no foreign bodies  Anesthesia: digital block    Anesthesia:  Local Anesthetic: lidocaine 1% without epinephrine  Anesthetic total: 3 mL  Patient sedated: no  Irrigation solution: saline  Irrigation method: syringe  Amount of cleaning: extensive  Debridement: none  Degree of undermining: none  Skin closure: 4-0 nylon  Number of sutures: 9  Technique: simple  Approximation: close  Approximation difficulty: simple  Dressing: splint for protection, antibiotic ointment and 4x4 sterile gauze  Patient tolerance: Patient tolerated the procedure well with no immediate complications        Labs Reviewed - No data to display          Medical Decision Making:   Clinical Tests:   Radiological Study: Ordered and Reviewed       APC / Resident Notes:   This is an evaluation of a 66 y.o. male that presents to the Emergency Department for a Laceration. Physical Exam shows a non-toxic, afebrile, and well appearing male.  There is a laceration to radial aspect of the left 2nd digit over the MCP. The laceration is flapped originating from the dorsal portion of the digit. There is no surrounding erythema or area of increased warmth. There is full ROM of the index finger against resistance. Equal sensation to the extremity. No visible tendon lacerations observed on exam.  Tetanus is not up to date. The wound was irrigated and visually inspected prior to closure. No visible foreign bodies noted. Vital Signs Are Reassuring. RESULTS: Xray X-ray of the finger with no evidence of fracture dislocation.  No bony deformities noted. The wound was sutured per the procedure note.     My overall impression is Laceration. I considered, but at this time, do not suspect cellulitis, compartment syndrome, underlying fracture, or suspect any retained foreign body at this time.  He has full range of motion with the finger, I do not suspect tendon injury at this time.     ED Course: Norco, Tdap. D/C Information: Laceration/Wound Care/Suture Removal instructions given. The diagnosis, treatment plan, instructions for follow-up and reevaluation with his PCP or Return to ED in 7-10 days for suture removal as well as ED return precautions were discussed and understanding was verbalized. All questions or concerns have been addressed. This case was discussed with Dr. Temple who is in agreement with my assessment and plan. JONO Wells, KEISHA-C        Scribe Attestation:   Scribe #1: I performed the above scribed service and the documentation accurately describes the services I performed. I attest to the accuracy of the note.    Attending Attestation:           Physician Attestation for Scribe:  Physician Attestation Statement for Scribe #1: I, KEISHA Keenan, reviewed documentation, as scribed by Keysha Corral in my presence, and it is both accurate and complete.                 ED Course     Clinical Impression:   The primary encounter diagnosis was  Laceration of finger. A diagnosis of Need for tetanus booster was also pertinent to this visit.    Disposition:   Disposition: Discharged  Condition: Stable                        Yunior Waite, [facility]  08/17/17 0154

## 2017-08-25 ENCOUNTER — OFFICE VISIT (OUTPATIENT)
Dept: FAMILY MEDICINE | Facility: CLINIC | Age: 67
End: 2017-08-25
Payer: MEDICARE

## 2017-08-25 VITALS
SYSTOLIC BLOOD PRESSURE: 100 MMHG | RESPIRATION RATE: 17 BRPM | OXYGEN SATURATION: 97 % | TEMPERATURE: 98 F | WEIGHT: 313.69 LBS | HEART RATE: 62 BPM | BODY MASS INDEX: 41.57 KG/M2 | HEIGHT: 73 IN | DIASTOLIC BLOOD PRESSURE: 62 MMHG

## 2017-08-25 DIAGNOSIS — E11.9 TYPE 2 DIABETES MELLITUS WITHOUT COMPLICATION, WITHOUT LONG-TERM CURRENT USE OF INSULIN: Primary | ICD-10-CM

## 2017-08-25 DIAGNOSIS — G47.33 OBSTRUCTIVE SLEEP APNEA SYNDROME: ICD-10-CM

## 2017-08-25 DIAGNOSIS — S61.211D LACERATION OF LEFT INDEX FINGER WITHOUT FOREIGN BODY WITHOUT DAMAGE TO NAIL, SUBSEQUENT ENCOUNTER: ICD-10-CM

## 2017-08-25 PROCEDURE — 99213 OFFICE O/P EST LOW 20 MIN: CPT | Mod: PBBFAC,PO | Performed by: FAMILY MEDICINE

## 2017-08-25 PROCEDURE — 99214 OFFICE O/P EST MOD 30 MIN: CPT | Mod: S$PBB,,, | Performed by: FAMILY MEDICINE

## 2017-08-25 PROCEDURE — 3044F HG A1C LEVEL LT 7.0%: CPT | Mod: ,,, | Performed by: FAMILY MEDICINE

## 2017-08-25 PROCEDURE — 3078F DIAST BP <80 MM HG: CPT | Mod: ,,, | Performed by: FAMILY MEDICINE

## 2017-08-25 PROCEDURE — 3074F SYST BP LT 130 MM HG: CPT | Mod: ,,, | Performed by: FAMILY MEDICINE

## 2017-08-25 PROCEDURE — 4010F ACE/ARB THERAPY RXD/TAKEN: CPT | Mod: ,,, | Performed by: FAMILY MEDICINE

## 2017-08-25 PROCEDURE — 99999 PR PBB SHADOW E&M-EST. PATIENT-LVL III: CPT | Mod: PBBFAC,,, | Performed by: FAMILY MEDICINE

## 2017-08-25 NOTE — PROGRESS NOTES
Chief Complaint   Patient presents with    Suture / Staple Removal     lt index     Routine Foot Care       Wale Calvo is a 67 y.o. male who presents per the Chief Complaint.  Pt is known to me and was last seen by me on 6/28/2017.  All known chronic medical issues have been documented.       Diabetes   He presents for his follow-up diabetic visit. He has gestational diabetes mellitus. No MedicAlert identification noted. The initial diagnosis of diabetes was made 5 years ago. His disease course has been stable. There are no hypoglycemic associated symptoms. Pertinent negatives for hypoglycemia include no confusion, dizziness, headaches, hunger, nervousness/anxiousness, pallor, seizures, sleepiness, sweats or tremors. Associated symptoms include foot paresthesias and weight loss. Pertinent negatives for diabetes include no blurred vision, no chest pain, no fatigue, no foot ulcerations, no polydipsia, no polyphagia, no polyuria, no visual change and no weakness. There are no hypoglycemic complications. Symptoms are stable. Diabetic complications include peripheral neuropathy. Pertinent negatives for diabetic complications include no autonomic neuropathy, CVA, heart disease, impotence, nephropathy, PVD or retinopathy. Risk factors for coronary artery disease include diabetes mellitus. Current diabetic treatment includes oral agent (dual therapy). He is compliant with treatment all of the time. His weight is decreasing rapidly. He is following a generally healthy diet. When asked about meal planning, he reported none. He has had a previous visit with a dietitian. He participates in exercise three times a week. He monitors blood glucose at home 1-2 x per day. Blood glucose monitoring compliance is excellent. His home blood glucose trend is decreasing steadily. An ACE inhibitor/angiotensin II receptor blocker is being taken. He does not see a podiatrist.Eye exam is current.        ROS  Review of Systems  "  Constitutional: Positive for weight loss. Negative for activity change, appetite change, chills, diaphoresis, fatigue, fever, malaise/fatigue and unexpected weight change.   HENT: Negative.  Negative for congestion, ear pain, hearing loss, nosebleeds, postnasal drip, rhinorrhea, sinus pressure, sneezing, sore throat and trouble swallowing.    Eyes: Negative for blurred vision, pain and visual disturbance.   Respiratory: Negative for cough, choking and shortness of breath.    Cardiovascular: Negative for chest pain, palpitations, orthopnea, leg swelling and PND.   Gastrointestinal: Negative for abdominal pain, constipation, diarrhea, nausea and vomiting.   Endocrine: Negative for polydipsia, polyphagia and polyuria.   Genitourinary: Negative for difficulty urinating, dysuria, frequency, impotence and urgency.   Musculoskeletal: Negative.  Negative for arthralgias, back pain, gait problem, joint swelling, myalgias and neck pain.   Skin: Positive for wound (cut on left index finger). Negative for pallor.   Allergic/Immunologic: Negative for environmental allergies and food allergies.   Neurological: Negative.  Negative for dizziness, tremors, seizures, syncope, weakness, light-headedness and headaches.   Psychiatric/Behavioral: Negative.  Negative for confusion, decreased concentration, dysphoric mood and sleep disturbance. The patient is not nervous/anxious.        Physical Exam  Vitals:    08/25/17 1500   BP: 100/62   Pulse: 62   Resp: 17   Temp: 97.9 °F (36.6 °C)    Body mass index is 41.39 kg/m².  Weight: (!) 142.3 kg (313 lb 11.4 oz)   Height: 6' 1" (185.4 cm)     Physical Exam   Constitutional: He is oriented to person, place, and time. He appears well-developed and well-nourished. He is active and cooperative.  Non-toxic appearance. He does not have a sickly appearance. He does not appear ill. No distress.   HENT:   Head: Normocephalic and atraumatic.   Right Ear: Hearing and external ear normal. No decreased " hearing is noted.   Left Ear: Hearing and external ear normal. No decreased hearing is noted.   Nose: Nose normal. No rhinorrhea or nasal deformity.   Mouth/Throat: Uvula is midline and oropharynx is clear and moist. He does not have dentures. Normal dentition.   Eyes: Conjunctivae, EOM and lids are normal. Pupils are equal, round, and reactive to light. Right eye exhibits no chemosis, no discharge and no exudate. No foreign body present in the right eye. Left eye exhibits no chemosis, no discharge and no exudate. No foreign body present in the left eye. No scleral icterus.   Neck: Normal range of motion and full passive range of motion without pain. Neck supple.   Cardiovascular: Normal rate, regular rhythm, S1 normal, S2 normal and normal heart sounds.  Exam reveals no gallop and no friction rub.    No murmur heard.  Pulmonary/Chest: Effort normal and breath sounds normal. No accessory muscle usage. No respiratory distress. He has no decreased breath sounds. He has no wheezes. He has no rhonchi. He has no rales.   Abdominal: Soft. Normal appearance. He exhibits no distension. There is no hepatosplenomegaly. There is no tenderness. There is no rigidity, no rebound and no guarding.   Musculoskeletal: Normal range of motion.        Hands:  Neurological: He is alert and oriented to person, place, and time. He has normal strength. No cranial nerve deficit or sensory deficit. He exhibits normal muscle tone. He displays no seizure activity. Coordination and gait normal.   Skin: Skin is warm, dry and intact. No rash noted. He is not diaphoretic.   Psychiatric: He has a normal mood and affect. His speech is normal and behavior is normal. Judgment and thought content normal. Cognition and memory are normal. He is attentive.       Assessment & Plan    1. Type 2 diabetes mellitus without complication, without long-term current use of insulin  Patient is encouraged to continue a diet low in carbohydrates and simple sugars.   Advised to begin or continue a weight loss program which focuses on good food choices and increased physical activity and encouraged to adhere to medication regimen and check glucose as recommended daily and report any changes in usual trends.  Screening blood test is not due at this time.  Foot exam was not done at this visit.     2. Obstructive sleep apnea syndrome  The current medical regimen is effective at this time and no changes to present plan or medications will be made at this visit.   Supplies reordered.  - CPAP/BIPAP SUPPLIES  - CPAP FOR HOME USE    3. Laceration of left index finger without foreign body without damage to nail, subsequent encounter  Area was cleaned and sutures were removed completely without any difficulty.  Wound was intact and dry, and Steri-Strips were used to keep skin intact.  Patient was advised that area can now be cleaned as normal, but avoid scrubbing or rough wiping for at least one week.  If skin reopens or becomes inflamed or tender, patient advised to return for reevaluation.         Follow up documented    ACTIVE MEDICAL ISSUES:  Documented in Problem List    PAST MEDICAL HISTORY  Documented    PAST SURGICAL HISTORY:  Documented    SOCIAL HISTORY:  Documented    FAMILY HISTORY:  Documented    ALLERGIES AND MEDICATIONS: updated and reviewed.  Documented    Health Maintenance       Date Due Completion Date    Eye Exam 07/01/2017 7/1/2016    Override on 11/11/2014: Done (future)    Influenza Vaccine 08/01/2017 11/11/2014    Override on 11/11/2014: Done (today)    Foot Exam 09/23/2017 9/23/2016    Override on 6/10/2015: Done (today)    Hemoglobin A1c 12/28/2017 6/28/2017    Override on 11/11/2014: Done (future)    Lipid Panel 12/29/2017 12/29/2016    Pneumococcal (65+) (2 of 2 - PPSV23) 11/11/2019 6/28/2017    Colonoscopy 11/10/2025 11/10/2015 (Done)    Override on 11/10/2015: Done    Override on 11/11/2014: Declined (had one done not due until couples more years)    TETANUS  VACCINE 08/17/2027 8/17/2017

## 2017-08-28 ENCOUNTER — TELEPHONE (OUTPATIENT)
Dept: FAMILY MEDICINE | Facility: CLINIC | Age: 67
End: 2017-08-28

## 2017-08-28 NOTE — TELEPHONE ENCOUNTER
----- Message from Gretel Ortega sent at 8/17/2017  2:07 PM CDT -----  Contact: Self   Patient says [facility] does not have his cpap and supplies request that Dr. Lombard told him he took care of . Please call patient at  [phone]

## 2017-09-22 ENCOUNTER — NURSE TRIAGE (OUTPATIENT)
Dept: ADMINISTRATIVE | Facility: CLINIC | Age: 67
End: 2017-09-22

## 2017-09-22 NOTE — TELEPHONE ENCOUNTER
"    Reason for Disposition   SEVERE chest pain    Protocols used: ST CHEST INJURY-A-[place]    Pt c/o left rib pain after injuring it while working. Pt states pain is 11 out of 10. Care advice given. Pt states he is not going to [facility] because it "sucks." pt upset with previous ER care. Pt states he will go to a different ER and hung up.   "

## 2017-11-09 DIAGNOSIS — I10 HYPERTENSION, UNSPECIFIED TYPE: Primary | ICD-10-CM

## 2017-11-09 NOTE — TELEPHONE ENCOUNTER
Left message notifying patient Rx request from pharmacy was approved and sent back. Note for patient to call clinic to confirm received message

## 2017-11-09 NOTE — TELEPHONE ENCOUNTER
----- Message from Daisy Taylor sent at 11/9/2017 11:03 AM CST -----  Contact: PT/[phone]  Calling get refill Ranitidine . [facility] [address] [place] [address]

## 2017-11-13 NOTE — TELEPHONE ENCOUNTER
Left message notifying patient Rx request from pharmacy was approved and sent back. Note for patient to call clinic if any concern. Sent [facility] message as well.

## 2017-12-12 ENCOUNTER — TELEPHONE (OUTPATIENT)
Dept: FAMILY MEDICINE | Facility: CLINIC | Age: 67
End: 2017-12-12

## 2017-12-12 NOTE — TELEPHONE ENCOUNTER
----- Message from Quinton Avelar sent at 12/12/2017 10:38 AM CST -----  Contact: Self/[phone]  Refill:  ranitidine (ZANTAC) 300 MG tablet    Pharmacy:  [address] [address]. Thank you.

## 2018-01-08 DIAGNOSIS — E11.9 TYPE 2 DIABETES MELLITUS WITHOUT COMPLICATION: ICD-10-CM

## 2018-02-20 DIAGNOSIS — E78.00 PURE HYPERCHOLESTEROLEMIA: ICD-10-CM

## 2018-02-20 RX ORDER — ATORVASTATIN CALCIUM 40 MG/1
TABLET, FILM COATED ORAL
Qty: 90 TABLET | Refills: 0 | Status: SHIPPED | OUTPATIENT
Start: 2018-02-20 | End: 2018-03-20 | Stop reason: SDUPTHER

## 2018-03-12 DIAGNOSIS — I10 ESSENTIAL HYPERTENSION: ICD-10-CM

## 2018-03-12 NOTE — TELEPHONE ENCOUNTER
----- Message from Lorna Kern sent at 3/12/2018 12:58 PM CDT -----  Contact: Self   Med refill: [phone]    lisinopril (PRINIVIL,ZESTRIL) 5 MG tablet    Pharmacy: [facility]

## 2018-03-12 NOTE — TELEPHONE ENCOUNTER
Spoke with patient requesting refill for Lisinopril , last refill was 06/28/17 90 days supplies plus 1 refill . Last OV was 08/25/2017

## 2018-03-13 RX ORDER — LISINOPRIL 5 MG/1
5 TABLET ORAL NIGHTLY
Qty: 90 TABLET | Refills: 3 | Status: SHIPPED | OUTPATIENT
Start: 2018-03-13 | End: 2019-04-10 | Stop reason: SDUPTHER

## 2018-03-19 NOTE — TELEPHONE ENCOUNTER
Zantac refill. Last refill 11/14/2017. LOV 08/25/2017      ----- Message from Paulette Hill sent at 3/19/2018 10:07 AM CDT -----  Contact: self  Refill request for --- ranitidine (ZANTAC) 300 MG tablet--- to [facility]. Please call back at [phone].

## 2018-03-20 ENCOUNTER — OFFICE VISIT (OUTPATIENT)
Dept: FAMILY MEDICINE | Facility: CLINIC | Age: 68
End: 2018-03-20
Payer: MEDICARE

## 2018-03-20 ENCOUNTER — LAB VISIT (OUTPATIENT)
Dept: LAB | Facility: HOSPITAL | Age: 68
End: 2018-03-20
Attending: FAMILY MEDICINE
Payer: MEDICARE

## 2018-03-20 VITALS
HEART RATE: 60 BPM | BODY MASS INDEX: 41.75 KG/M2 | OXYGEN SATURATION: 95 % | RESPIRATION RATE: 18 BRPM | WEIGHT: 315 LBS | SYSTOLIC BLOOD PRESSURE: 96 MMHG | DIASTOLIC BLOOD PRESSURE: 60 MMHG | TEMPERATURE: 98 F | HEIGHT: 73 IN

## 2018-03-20 DIAGNOSIS — E11.9 TYPE 2 DIABETES MELLITUS WITHOUT COMPLICATION: ICD-10-CM

## 2018-03-20 DIAGNOSIS — E11.9 TYPE 2 DIABETES MELLITUS WITHOUT COMPLICATION, WITHOUT LONG-TERM CURRENT USE OF INSULIN: ICD-10-CM

## 2018-03-20 DIAGNOSIS — E78.00 PURE HYPERCHOLESTEROLEMIA: ICD-10-CM

## 2018-03-20 DIAGNOSIS — K21.9 GASTROESOPHAGEAL REFLUX DISEASE WITHOUT ESOPHAGITIS: ICD-10-CM

## 2018-03-20 DIAGNOSIS — E66.01 MORBID OBESITY WITH BMI OF 40.0-44.9, ADULT: ICD-10-CM

## 2018-03-20 DIAGNOSIS — I25.10 CORONARY ARTERY DISEASE INVOLVING NATIVE CORONARY ARTERY OF NATIVE HEART WITHOUT ANGINA PECTORIS: Primary | ICD-10-CM

## 2018-03-20 LAB
CHOLEST SERPL-MCNC: 137 MG/DL
CHOLEST/HDLC SERPL: 4 {RATIO}
ESTIMATED AVG GLUCOSE: 105 MG/DL
HBA1C MFR BLD HPLC: 5.3 %
HDLC SERPL-MCNC: 34 MG/DL
HDLC SERPL: 24.8 %
LDLC SERPL CALC-MCNC: 85.8 MG/DL
NONHDLC SERPL-MCNC: 103 MG/DL
TRIGL SERPL-MCNC: 86 MG/DL

## 2018-03-20 PROCEDURE — 99213 OFFICE O/P EST LOW 20 MIN: CPT | Mod: PBBFAC,PO | Performed by: FAMILY MEDICINE

## 2018-03-20 PROCEDURE — 36415 COLL VENOUS BLD VENIPUNCTURE: CPT | Mod: PO

## 2018-03-20 PROCEDURE — 99999 PR PBB SHADOW E&M-EST. PATIENT-LVL III: CPT | Mod: PBBFAC,,, | Performed by: FAMILY MEDICINE

## 2018-03-20 PROCEDURE — 80061 LIPID PANEL: CPT

## 2018-03-20 PROCEDURE — 99214 OFFICE O/P EST MOD 30 MIN: CPT | Mod: S$PBB,,, | Performed by: FAMILY MEDICINE

## 2018-03-20 PROCEDURE — 83036 HEMOGLOBIN GLYCOSYLATED A1C: CPT

## 2018-03-20 RX ORDER — ATORVASTATIN CALCIUM 40 MG/1
TABLET, FILM COATED ORAL
Qty: 90 TABLET | Refills: 3 | Status: SHIPPED | OUTPATIENT
Start: 2018-03-20 | End: 2019-06-25 | Stop reason: SDUPTHER

## 2018-03-20 RX ORDER — CLOPIDOGREL BISULFATE 75 MG/1
TABLET ORAL
Qty: 90 TABLET | Refills: 3 | Status: SHIPPED | OUTPATIENT
Start: 2018-03-20 | End: 2018-05-14

## 2018-03-20 NOTE — PROGRESS NOTES
No chief complaint on file.      Wale Calvo is a 67 y.o. male who presents per the Chief Complaint.  Pt is known to me and was last seen by me on 8/25/2017.  All known chronic medical issues have been documented.       Diabetes   He presents for his follow-up diabetic visit. He has gestational diabetes mellitus. No MedicAlert identification noted. The initial diagnosis of diabetes was made 5 years ago. His disease course has been stable. There are no hypoglycemic associated symptoms. Pertinent negatives for hypoglycemia include no confusion, dizziness, headaches, hunger, nervousness/anxiousness, pallor, seizures, sleepiness, sweats or tremors. Associated symptoms include chest pain (with exertion; resolved), foot paresthesias and weight loss. Pertinent negatives for diabetes include no blurred vision, no fatigue, no foot ulcerations, no polydipsia, no polyphagia, no polyuria, no visual change and no weakness. There are no hypoglycemic complications. Symptoms are stable. Diabetic complications include peripheral neuropathy. Pertinent negatives for diabetic complications include no autonomic neuropathy, CVA, heart disease, impotence, nephropathy, PVD or retinopathy. Risk factors for coronary artery disease include diabetes mellitus. Current diabetic treatment includes oral agent (dual therapy). He is compliant with treatment all of the time. His weight is decreasing rapidly. He is following a generally healthy diet. When asked about meal planning, he reported none. He has had a previous visit with a dietitian. He participates in exercise three times a week. He monitors blood glucose at home 1-2 x per day. Blood glucose monitoring compliance is excellent. His home blood glucose trend is decreasing steadily. An ACE inhibitor/angiotensin II receptor blocker is being taken. He does not see a podiatrist.Eye exam is current.        ROS  Review of Systems   Constitutional: Positive for weight loss. Negative for  "activity change, appetite change, chills, diaphoresis, fatigue, fever and unexpected weight change.   HENT: Negative.  Negative for congestion, ear pain, hearing loss, nosebleeds, postnasal drip, rhinorrhea, sinus pressure, sneezing, sore throat and trouble swallowing.    Eyes: Negative for blurred vision, pain and visual disturbance.   Respiratory: Negative for cough, choking, chest tightness and shortness of breath.    Cardiovascular: Positive for chest pain (with exertion; resolved). Negative for leg swelling.   Gastrointestinal: Negative for abdominal pain, constipation, diarrhea, nausea and vomiting.   Endocrine: Negative for polydipsia, polyphagia and polyuria.   Genitourinary: Negative for difficulty urinating, dysuria, frequency, impotence and urgency.   Musculoskeletal: Negative.  Negative for arthralgias, back pain, gait problem, joint swelling and myalgias.   Skin: Negative.  Negative for pallor.   Allergic/Immunologic: Negative for environmental allergies and food allergies.   Neurological: Negative.  Negative for dizziness, tremors, seizures, syncope, weakness, light-headedness and headaches.   Psychiatric/Behavioral: Negative.  Negative for confusion, decreased concentration, dysphoric mood and sleep disturbance. The patient is not nervous/anxious.        Physical Exam  Vitals:    03/20/18 1040   BP: 96/60   Pulse: 60   Resp: 18   Temp: 98 °F (36.7 °C)    Body mass index is 43.19 kg/m².  Weight: (!) 148.5 kg (327 lb 6.1 oz)   Height: 6' 1" (185.4 cm)     Physical Exam   Constitutional: He is oriented to person, place, and time. He appears well-developed and well-nourished. He is active and cooperative.  Non-toxic appearance. He does not have a sickly appearance. He does not appear ill. No distress.   HENT:   Head: Normocephalic and atraumatic.   Right Ear: Hearing and external ear normal. No decreased hearing is noted.   Left Ear: Hearing and external ear normal. No decreased hearing is noted.   Nose: " Nose normal. No rhinorrhea or nasal deformity.   Mouth/Throat: Uvula is midline and oropharynx is clear and moist. He does not have dentures. Normal dentition.   Eyes: Conjunctivae, EOM and lids are normal. Pupils are equal, round, and reactive to light. Right eye exhibits no chemosis, no discharge and no exudate. No foreign body present in the right eye. Left eye exhibits no chemosis, no discharge and no exudate. No foreign body present in the left eye. No scleral icterus.   Neck: Normal range of motion and full passive range of motion without pain. Neck supple.   Cardiovascular: Normal rate, regular rhythm, S1 normal, S2 normal and normal heart sounds.  Exam reveals no gallop and no friction rub.    No murmur heard.  Pulmonary/Chest: Effort normal and breath sounds normal. No accessory muscle usage. No respiratory distress. He has no decreased breath sounds. He has no wheezes. He has no rhonchi. He has no rales.   Abdominal: Soft. Normal appearance. He exhibits no distension. There is no hepatosplenomegaly. There is no tenderness. There is no rigidity, no rebound and no guarding.   Musculoskeletal: Normal range of motion.   Neurological: He is alert and oriented to person, place, and time. He has normal strength. No cranial nerve deficit or sensory deficit. He exhibits normal muscle tone. He displays no seizure activity. Coordination and gait normal.   Skin: Skin is warm, dry and intact. No rash noted. He is not diaphoretic.   Psychiatric: He has a normal mood and affect. His speech is normal and behavior is normal. Judgment and thought content normal. Cognition and memory are normal. He is attentive.       Assessment & Plan    1. Coronary artery disease involving native coronary artery of native heart without angina pectoris  Stable; no therapeutic changes at this time.  Managed by Cardiology; stents in place.  - clopidogrel (PLAVIX) 75 mg tablet; TAKE 1 TABLET(75 MG) BY MOUTH EVERY DAY  Dispense: 90 tablet;  Refill: 3    2. Type 2 diabetes mellitus without complication, without long-term current use of insulin  Patient is encouraged to follow a diet low in carbohydrates and simple sugars.  Advised to focus on good food choices and increased physical activity and encouraged to adhere to medication regimen and check glucose as recommended daily and contact office if glucose levels are increasing over time.  Screening blood test is not due at this time.  Foot exam was not done at this visit.     3. Pure hypercholesterolemia  We discussed ways to manage cholesterol levels, including increasing whole grain foods and decreasing fried and fatty foods.  I also recommended OTC Omega 3 and Omega 6 supplements to improve overall cholesterol levels.  Will continue current therapy at this visit; patient is not due for screening blood test at this time.   - atorvastatin (LIPITOR) 40 MG tablet; TAKE 1 TABLET(40 MG) BY MOUTH EVERY EVENING  Dispense: 90 tablet; Refill: 3    4. Gastroesophageal reflux disease without esophagitis  Patient was advised to continue medication and to decrease intake of caffeine and spicy foods and to elevate head while lying down.  Also advised to avoid lying down within 3 hours of last meal.  If symptoms continue with treatment, follow up for further evaluation.     - ranitidine (ZANTAC) 300 MG tablet; TAKE 1 TABLET(300 MG) BY MOUTH TWICE DAILY  Dispense: 180 tablet; Refill: 3    5. Morbid obesity with BMI of 40.0-44.9, adult  We discussed weight and safe, effective ways of losing pounds, including low carbohydrate, low fat diet and increasing physical activity to improve cardiovascular performance and increase metabolism.  Patient was encouraged to set realistic attainable goals for weight loss, and we will follow up periodically.       Follow up documented    ACTIVE MEDICAL ISSUES:  Documented in Problem List    PAST MEDICAL HISTORY  Documented    PAST SURGICAL HISTORY:  Documented    SOCIAL  HISTORY:  Documented    FAMILY HISTORY:  Documented    ALLERGIES AND MEDICATIONS: updated and reviewed.  Documented    Health Maintenance       Date Due Completion Date    Influenza Vaccine 08/01/2017 11/11/2014    Override on 11/11/2014: Done (today)    Lipid Panel 12/29/2017 12/29/2016    Hemoglobin A1c 02/25/2018 8/25/2017    Override on 11/11/2014: Done (future)    Eye Exam 06/28/2018 6/28/2017    Override on 11/11/2014: Done (future)    Foot Exam 08/25/2018 8/25/2017    Override on 6/10/2015: Done (today)    High Dose Statin 02/20/2019 2/20/2018    Pneumococcal (65+) (2 of 2 - PPSV23) 11/11/2019 6/28/2017    Colonoscopy 11/10/2025 11/10/2015 (Done)    Override on 11/10/2015: Done    Override on 11/11/2014: Declined (had one done not due until couples more years)    TETANUS VACCINE 08/17/2027 8/17/2017

## 2018-03-22 ENCOUNTER — TELEPHONE (OUTPATIENT)
Dept: OPHTHALMOLOGY | Facility: CLINIC | Age: 68
End: 2018-03-22

## 2018-03-22 ENCOUNTER — OFFICE VISIT (OUTPATIENT)
Dept: OPHTHALMOLOGY | Facility: CLINIC | Age: 68
End: 2018-03-22
Payer: MEDICARE

## 2018-03-22 DIAGNOSIS — Z96.1 PSEUDOPHAKIA: ICD-10-CM

## 2018-03-22 DIAGNOSIS — H43.812 VITREOUS DETACHMENT OF LEFT EYE: Primary | ICD-10-CM

## 2018-03-22 DIAGNOSIS — H26.493 PCO (POSTERIOR CAPSULAR OPACIFICATION), BILATERAL: ICD-10-CM

## 2018-03-22 DIAGNOSIS — I10 ESSENTIAL HYPERTENSION: ICD-10-CM

## 2018-03-22 PROCEDURE — 99999 PR PBB SHADOW E&M-EST. PATIENT-LVL I: CPT | Mod: PBBFAC,,, | Performed by: OPHTHALMOLOGY

## 2018-03-22 PROCEDURE — 99211 OFF/OP EST MAY X REQ PHY/QHP: CPT | Mod: PBBFAC,PO | Performed by: OPHTHALMOLOGY

## 2018-03-22 PROCEDURE — 92014 COMPRE OPH EXAM EST PT 1/>: CPT | Mod: S$PBB,,, | Performed by: OPHTHALMOLOGY

## 2018-03-22 NOTE — PROGRESS NOTES
Subjective:       Patient ID: Wale Calvo is a 67 y.o. male.    Chief Complaint: Spots and/or Floaters    HPI     DSL- 9/29/16     Pt present with a c/o of onset floaters and flashes LT eye . Pt denies   pain. Pt denies any Va decreased. Pt has slight pain OU.    Eyemeds  At's OU PRN      Last edited by Akila Beaulieu on 3/22/2018  3:59 PM. (History)             Assessment:       1. Vitreous detachment of left eye    2. PCO (posterior capsular opacification), bilateral    3. Essential hypertension    4. Pseudophakia        Plan:       PVD OS-Causing floaters.  Early PCO OS>OD- Not Visually Significant.     HTN-No retinopathy OU.        Control HTN.  RTC 1 yr.

## 2018-04-06 NOTE — PROGRESS NOTES
Patient, Wale Calvo (MRN #[id number]), presented with a recent Platelet count less than 150 K/uL consistent with the definition of thrombocytopenia (ICD10 - D69.6).    Platelets   Date Value Ref Range Status   09/30/2016 121 (L) 150 - 350 K/uL Final     The patient's thrombocytopenia was monitored, evaluated, addressed and/or treated. This addendum to the medical record is made on 04/06/2018.

## 2018-05-12 DIAGNOSIS — E78.5 HYPERLIPIDEMIA, UNSPECIFIED HYPERLIPIDEMIA TYPE: ICD-10-CM

## 2018-05-14 RX ORDER — CLOPIDOGREL BISULFATE 75 MG/1
TABLET ORAL
Qty: 90 TABLET | Refills: 0 | Status: SHIPPED | OUTPATIENT
Start: 2018-05-14 | End: 2018-08-13 | Stop reason: SDUPTHER

## 2018-08-13 DIAGNOSIS — E78.5 HYPERLIPIDEMIA, UNSPECIFIED HYPERLIPIDEMIA TYPE: ICD-10-CM

## 2018-08-13 RX ORDER — CLOPIDOGREL BISULFATE 75 MG/1
TABLET ORAL
Qty: 90 TABLET | Refills: 0 | Status: SHIPPED | OUTPATIENT
Start: 2018-08-13 | End: 2018-12-06

## 2018-08-13 NOTE — TELEPHONE ENCOUNTER
----- Message from Clare Marinelli sent at 8/11/2018  8:23 AM CDT -----  Contact: self  Pt calling to request a 90 day refill of clopidogrel (PLAVIX) 75 mg tablet [facility]. Please call [phone].

## 2018-09-05 ENCOUNTER — OFFICE VISIT (OUTPATIENT)
Dept: FAMILY MEDICINE | Facility: CLINIC | Age: 68
End: 2018-09-05
Payer: MEDICARE

## 2018-09-05 VITALS
WEIGHT: 315 LBS | SYSTOLIC BLOOD PRESSURE: 120 MMHG | HEART RATE: 68 BPM | TEMPERATURE: 98 F | BODY MASS INDEX: 41.75 KG/M2 | HEIGHT: 73 IN | RESPIRATION RATE: 17 BRPM | OXYGEN SATURATION: 96 % | DIASTOLIC BLOOD PRESSURE: 66 MMHG

## 2018-09-05 DIAGNOSIS — T31.0 BURN (ANY DEGREE) INVOLVING LESS THAN 10% OF BODY SURFACE: Primary | ICD-10-CM

## 2018-09-05 PROCEDURE — 99999 PR PBB SHADOW E&M-EST. PATIENT-LVL III: CPT | Mod: PBBFAC,,, | Performed by: FAMILY MEDICINE

## 2018-09-05 PROCEDURE — 99213 OFFICE O/P EST LOW 20 MIN: CPT | Mod: S$PBB,,, | Performed by: FAMILY MEDICINE

## 2018-09-05 PROCEDURE — 99213 OFFICE O/P EST LOW 20 MIN: CPT | Mod: PBBFAC,PO | Performed by: FAMILY MEDICINE

## 2018-09-05 RX ORDER — LISINOPRIL 2.5 MG/1
2.5 TABLET ORAL
COMMUNITY
End: 2019-06-25

## 2018-09-05 RX ORDER — NEBIVOLOL 5 MG/1
5 TABLET ORAL
COMMUNITY
Start: 2013-03-07 | End: 2019-09-19

## 2018-09-05 RX ORDER — DOXYCYCLINE HYCLATE 100 MG
TABLET ORAL
Refills: 0 | COMMUNITY
Start: 2018-08-24 | End: 2019-01-14

## 2018-09-05 RX ORDER — HYDROCODONE BITARTRATE AND ACETAMINOPHEN 10; 325 MG/1; MG/1
1 TABLET ORAL EVERY 6 HOURS PRN
COMMUNITY
Start: 2016-06-03 | End: 2019-09-19

## 2018-09-05 RX ORDER — SILVER SULFADIAZINE 10 G/1000G
CREAM TOPICAL
Refills: 1 | COMMUNITY
Start: 2018-08-24 | End: 2019-09-19

## 2018-09-05 RX ORDER — ATORVASTATIN CALCIUM 80 MG/1
40 TABLET, FILM COATED ORAL
COMMUNITY
End: 2019-06-25

## 2018-09-05 RX ORDER — CLOPIDOGREL BISULFATE 75 MG/1
75 TABLET ORAL
COMMUNITY
Start: 2013-04-18 | End: 2018-12-05 | Stop reason: SDUPTHER

## 2018-09-05 RX ORDER — ASPIRIN 325 MG
325 TABLET ORAL
COMMUNITY
End: 2019-09-19

## 2018-09-05 NOTE — PROGRESS NOTES
Chief Complaint   Patient presents with    Burn on top of lt foot       Wale Calvo is a 67 y.o. male who presents per the Chief Complaint.  Pt is known to me and was last seen by me on 3/20/2018.  All known chronic medical issues have been documented.  He presents to follow up on a recent burn on his foot.  He states he wasted gas on his foot and used a  to clean it off, but due to neuropathy in the foot, did not feel any pain but noticed the skin damage later.  He was seen and treated at a clinic in [place] where this occurred.    Foot Injury    The incident occurred 5 to 7 days ago. The incident occurred at home. The injury mechanism was a burn. The pain is present in the left foot. The pain is at a severity of 0/10. The patient is experiencing no pain. Associated symptoms include numbness. Pertinent negatives include no inability to bear weight, loss of motion, loss of sensation, muscle weakness or tingling. He reports no foreign bodies present. Nothing aggravates the symptoms. Treatments tried: topical burn medication and antibiotics. The treatment provided significant relief.        ROS  Review of Systems   Constitutional: Negative.  Negative for activity change, appetite change, chills, diaphoresis, fatigue, fever and unexpected weight change.   HENT: Negative.  Negative for congestion, ear pain, hearing loss, nosebleeds, postnasal drip, rhinorrhea, sinus pressure, sneezing, sore throat and trouble swallowing.    Eyes: Negative for pain and visual disturbance.   Respiratory: Negative for cough, choking and shortness of breath.    Cardiovascular: Negative for chest pain and leg swelling.   Gastrointestinal: Negative for abdominal pain, constipation, diarrhea, nausea and vomiting.   Genitourinary: Negative for difficulty urinating, dysuria, frequency and urgency.   Musculoskeletal: Negative.  Negative for arthralgias, back pain, gait problem, joint swelling and myalgias.   Skin:  "Positive for wound (top of left foot).   Allergic/Immunologic: Negative for environmental allergies and food allergies.   Neurological: Positive for numbness. Negative for dizziness, tingling, seizures, syncope, weakness, light-headedness and headaches.   Psychiatric/Behavioral: Negative.  Negative for confusion, decreased concentration, dysphoric mood and sleep disturbance. The patient is not nervous/anxious.        Physical Exam  Vitals:    09/05/18 1604   BP: 120/66   Pulse: 68   Resp: 17   Temp: 98.1 °F (36.7 °C)    Body mass index is 44.27 kg/m².  Weight: (!) 152.2 kg (335 lb 8.6 oz)   Height: 6' 1" (185.4 cm)     Physical Exam   Constitutional: He is oriented to person, place, and time. He appears well-developed and well-nourished. He is active and cooperative.  Non-toxic appearance. He does not have a sickly appearance. He does not appear ill. No distress.   HENT:   Head: Normocephalic and atraumatic.   Right Ear: Hearing and external ear normal. No decreased hearing is noted.   Left Ear: Hearing and external ear normal. No decreased hearing is noted.   Nose: Nose normal. No rhinorrhea or nasal deformity.   Mouth/Throat: Uvula is midline and oropharynx is clear and moist. He does not have dentures. Normal dentition.   Eyes: Conjunctivae, EOM and lids are normal. Pupils are equal, round, and reactive to light. Right eye exhibits no chemosis, no discharge and no exudate. No foreign body present in the right eye. Left eye exhibits no chemosis, no discharge and no exudate. No foreign body present in the left eye. No scleral icterus.   Neck: Normal range of motion and full passive range of motion without pain. Neck supple.   Cardiovascular: Normal rate, regular rhythm, S1 normal, S2 normal and normal heart sounds. Exam reveals no gallop and no friction rub.   No murmur heard.  Pulmonary/Chest: Effort normal and breath sounds normal. No accessory muscle usage. No respiratory distress. He has no decreased breath " sounds. He has no wheezes. He has no rhonchi. He has no rales.   Abdominal: Soft. Normal appearance. He exhibits no distension. There is no hepatosplenomegaly. There is no tenderness. There is no rigidity, no rebound and no guarding.   Musculoskeletal: Normal range of motion.   Neurological: He is alert and oriented to person, place, and time. He has normal strength. No cranial nerve deficit or sensory deficit. He exhibits normal muscle tone. He displays no seizure activity. Coordination and gait normal.   Skin: Skin is warm, dry and intact. No rash noted. He is not diaphoretic.        Superficial chemical burn on left dorsal foot; healing well with no tenderness or discharge.   Psychiatric: He has a normal mood and affect. His speech is normal and behavior is normal. Judgment and thought content normal. Cognition and memory are normal. He is attentive.       Assessment & Plan    1. Burn (any degree) involving less than 10% of body surface  Wound treated appropriately; healing well.  Encouraged gentle removal of dead skin to promote further healing.      Follow up documented    ACTIVE MEDICAL ISSUES:  Documented in Problem List    PAST MEDICAL HISTORY  Documented    PAST SURGICAL HISTORY:  Documented    SOCIAL HISTORY:  Documented    FAMILY HISTORY:  Documented    ALLERGIES AND MEDICATIONS: updated and reviewed.  Documented    Health Maintenance       Date Due Completion Date    Influenza Vaccine 08/01/2018 11/11/2014    Override on 11/11/2014: Done (today)    Foot Exam 08/25/2018 8/25/2017    Override on 6/10/2015: Done (today)    Hemoglobin A1c 09/20/2018 3/20/2018    Override on 11/11/2014: Done (future)    Lipid Panel 03/20/2019 3/20/2018    High Dose Statin 03/22/2019 3/22/2018    Eye Exam 03/22/2019 3/22/2018    Override on 11/11/2014: Done (future)    Pneumococcal (65+) (2 of 2 - PPSV23) 11/11/2019 6/28/2017    Colonoscopy 05/01/2027 5/1/2017    Override on 11/10/2015: Done    Override on 11/11/2014: Declined  (had one done not due until couples more years)    TETANUS VACCINE 08/17/2027 8/17/2017

## 2018-09-28 DIAGNOSIS — E11.9 TYPE 2 DIABETES MELLITUS WITHOUT COMPLICATION: ICD-10-CM

## 2018-10-09 DIAGNOSIS — E78.00 PURE HYPERCHOLESTEROLEMIA: ICD-10-CM

## 2018-10-09 DIAGNOSIS — E11.9 TYPE 2 DIABETES MELLITUS WITHOUT COMPLICATION, WITHOUT LONG-TERM CURRENT USE OF INSULIN: Primary | ICD-10-CM

## 2018-10-09 DIAGNOSIS — Z12.5 SPECIAL SCREENING FOR MALIGNANT NEOPLASM OF PROSTATE: ICD-10-CM

## 2018-11-29 RX ORDER — CLOPIDOGREL BISULFATE 75 MG/1
75 TABLET ORAL DAILY
Qty: 30 TABLET | Refills: 3 | Status: CANCELLED | OUTPATIENT
Start: 2018-11-29

## 2018-11-29 NOTE — TELEPHONE ENCOUNTER
----- Message from Clarita Chang sent at 11/29/2018  1:50 PM CST -----  Contact: Wale [phone]  REFILL: clopidogrel (PLAVIX) 75 mg tablet    PHARMACY: [facility] [address] [place] [address] [place] AT [facility]

## 2018-12-06 RX ORDER — CLOPIDOGREL BISULFATE 75 MG/1
75 TABLET ORAL DAILY
Qty: 90 TABLET | Refills: 2 | Status: SHIPPED | OUTPATIENT
Start: 2018-12-06 | End: 2019-09-19 | Stop reason: SDUPTHER

## 2019-01-14 ENCOUNTER — OFFICE VISIT (OUTPATIENT)
Dept: FAMILY MEDICINE | Facility: CLINIC | Age: 69
End: 2019-01-14
Payer: MEDICARE

## 2019-01-14 ENCOUNTER — LAB VISIT (OUTPATIENT)
Dept: LAB | Facility: HOSPITAL | Age: 69
End: 2019-01-14
Attending: FAMILY MEDICINE
Payer: MEDICARE

## 2019-01-14 VITALS
HEART RATE: 66 BPM | OXYGEN SATURATION: 94 % | HEIGHT: 73 IN | SYSTOLIC BLOOD PRESSURE: 106 MMHG | TEMPERATURE: 98 F | DIASTOLIC BLOOD PRESSURE: 64 MMHG | BODY MASS INDEX: 41.75 KG/M2 | RESPIRATION RATE: 16 BRPM | WEIGHT: 315 LBS

## 2019-01-14 DIAGNOSIS — E78.00 PURE HYPERCHOLESTEROLEMIA: ICD-10-CM

## 2019-01-14 DIAGNOSIS — Z23 FLU VACCINE NEED: ICD-10-CM

## 2019-01-14 DIAGNOSIS — E11.9 TYPE 2 DIABETES MELLITUS WITHOUT COMPLICATION, WITHOUT LONG-TERM CURRENT USE OF INSULIN: ICD-10-CM

## 2019-01-14 DIAGNOSIS — H66.92 LEFT OTITIS MEDIA, UNSPECIFIED OTITIS MEDIA TYPE: Primary | ICD-10-CM

## 2019-01-14 DIAGNOSIS — I10 ESSENTIAL HYPERTENSION: ICD-10-CM

## 2019-01-14 DIAGNOSIS — E11.9 TYPE 2 DIABETES MELLITUS WITHOUT COMPLICATION: ICD-10-CM

## 2019-01-14 DIAGNOSIS — E11.65 UNCONTROLLED TYPE 2 DIABETES MELLITUS WITH HYPERGLYCEMIA: ICD-10-CM

## 2019-01-14 DIAGNOSIS — Z12.5 SPECIAL SCREENING FOR MALIGNANT NEOPLASM OF PROSTATE: ICD-10-CM

## 2019-01-14 DIAGNOSIS — E66.01 MORBID OBESITY WITH BMI OF 40.0-44.9, ADULT: ICD-10-CM

## 2019-01-14 LAB
ALBUMIN SERPL BCP-MCNC: 4.1 G/DL
ALP SERPL-CCNC: 123 U/L
ALT SERPL W/O P-5'-P-CCNC: 23 U/L
ANION GAP SERPL CALC-SCNC: 10 MMOL/L
AST SERPL-CCNC: 23 U/L
BASOPHILS # BLD AUTO: 0.09 K/UL
BASOPHILS NFR BLD: 1.1 %
BILIRUB SERPL-MCNC: 1.4 MG/DL
BUN SERPL-MCNC: 25 MG/DL
CALCIUM SERPL-MCNC: 9.7 MG/DL
CHLORIDE SERPL-SCNC: 102 MMOL/L
CHOLEST SERPL-MCNC: 134 MG/DL
CHOLEST/HDLC SERPL: 4.5 {RATIO}
CO2 SERPL-SCNC: 27 MMOL/L
COMPLEXED PSA SERPL-MCNC: 0.5 NG/ML
CREAT SERPL-MCNC: 1.8 MG/DL
DIFFERENTIAL METHOD: ABNORMAL
EOSINOPHIL # BLD AUTO: 0.3 K/UL
EOSINOPHIL NFR BLD: 3 %
ERYTHROCYTE [DISTWIDTH] IN BLOOD BY AUTOMATED COUNT: 12.8 %
EST. GFR  (AFRICAN AMERICAN): 44 ML/MIN/1.73 M^2
EST. GFR  (NON AFRICAN AMERICAN): 38.1 ML/MIN/1.73 M^2
GLUCOSE SERPL-MCNC: 260 MG/DL
HCT VFR BLD AUTO: 46.1 %
HDLC SERPL-MCNC: 30 MG/DL
HDLC SERPL: 22.4 %
HGB BLD-MCNC: 15 G/DL
IMM GRANULOCYTES # BLD AUTO: 0.04 K/UL
IMM GRANULOCYTES NFR BLD AUTO: 0.5 %
LDLC SERPL CALC-MCNC: 76.6 MG/DL
LYMPHOCYTES # BLD AUTO: 1.1 K/UL
LYMPHOCYTES NFR BLD: 12.9 %
MCH RBC QN AUTO: 29.1 PG
MCHC RBC AUTO-ENTMCNC: 32.5 G/DL
MCV RBC AUTO: 89 FL
MONOCYTES # BLD AUTO: 0.7 K/UL
MONOCYTES NFR BLD: 7.7 %
NEUTROPHILS # BLD AUTO: 6.3 K/UL
NEUTROPHILS NFR BLD: 74.8 %
NONHDLC SERPL-MCNC: 104 MG/DL
NRBC BLD-RTO: 0 /100 WBC
PLATELET # BLD AUTO: 162 K/UL
PMV BLD AUTO: 12 FL
POTASSIUM SERPL-SCNC: 4.5 MMOL/L
PROT SERPL-MCNC: 7.7 G/DL
RBC # BLD AUTO: 5.16 M/UL
SODIUM SERPL-SCNC: 139 MMOL/L
TRIGL SERPL-MCNC: 137 MG/DL
WBC # BLD AUTO: 8.42 K/UL

## 2019-01-14 PROCEDURE — 84153 ASSAY OF PSA TOTAL: CPT

## 2019-01-14 PROCEDURE — 99999 PR PBB SHADOW E&M-EST. PATIENT-LVL V: CPT | Mod: PBBFAC,,, | Performed by: PHYSICIAN ASSISTANT

## 2019-01-14 PROCEDURE — 80053 COMPREHEN METABOLIC PANEL: CPT

## 2019-01-14 PROCEDURE — 99214 PR OFFICE/OUTPT VISIT, EST, LEVL IV, 30-39 MIN: ICD-10-PCS | Mod: S$PBB,,, | Performed by: PHYSICIAN ASSISTANT

## 2019-01-14 PROCEDURE — 99214 OFFICE O/P EST MOD 30 MIN: CPT | Mod: S$PBB,,, | Performed by: PHYSICIAN ASSISTANT

## 2019-01-14 PROCEDURE — 99999 PR PBB SHADOW E&M-EST. PATIENT-LVL V: ICD-10-PCS | Mod: PBBFAC,,, | Performed by: PHYSICIAN ASSISTANT

## 2019-01-14 PROCEDURE — 80061 LIPID PANEL: CPT

## 2019-01-14 PROCEDURE — 83036 HEMOGLOBIN GLYCOSYLATED A1C: CPT

## 2019-01-14 PROCEDURE — 85025 COMPLETE CBC W/AUTO DIFF WBC: CPT

## 2019-01-14 PROCEDURE — 99215 OFFICE O/P EST HI 40 MIN: CPT | Mod: PBBFAC,PO | Performed by: PHYSICIAN ASSISTANT

## 2019-01-14 PROCEDURE — 90662 IIV NO PRSV INCREASED AG IM: CPT | Mod: PBBFAC,PO

## 2019-01-14 PROCEDURE — 36415 COLL VENOUS BLD VENIPUNCTURE: CPT | Mod: PO

## 2019-01-14 RX ORDER — AZITHROMYCIN 250 MG/1
TABLET, FILM COATED ORAL
Qty: 6 TABLET | Refills: 0 | Status: SHIPPED | OUTPATIENT
Start: 2019-01-14 | End: 2019-09-19

## 2019-01-14 NOTE — PROGRESS NOTES
Diabetes Panel - will get today      Foot Exam - need referral         Influenza Vaccine (Seasonal)- decline

## 2019-01-15 ENCOUNTER — TELEPHONE (OUTPATIENT)
Dept: FAMILY MEDICINE | Facility: CLINIC | Age: 69
End: 2019-01-15

## 2019-01-15 DIAGNOSIS — E11.9 TYPE 2 DIABETES MELLITUS WITHOUT COMPLICATION, WITHOUT LONG-TERM CURRENT USE OF INSULIN: Primary | ICD-10-CM

## 2019-01-15 LAB
ESTIMATED AVG GLUCOSE: 200 MG/DL
HBA1C MFR BLD HPLC: 8.6 %

## 2019-01-15 RX ORDER — METFORMIN HYDROCHLORIDE 1000 MG/1
1000 TABLET ORAL 2 TIMES DAILY WITH MEALS
Qty: 180 TABLET | Refills: 1 | Status: SHIPPED | OUTPATIENT
Start: 2019-01-15 | End: 2019-11-11 | Stop reason: SDUPTHER

## 2019-01-15 RX ORDER — GLIMEPIRIDE 4 MG/1
TABLET ORAL
Qty: 180 TABLET | Refills: 1 | Status: SHIPPED | OUTPATIENT
Start: 2019-01-15 | End: 2019-11-11 | Stop reason: SDUPTHER

## 2019-01-15 NOTE — TELEPHONE ENCOUNTER
----- Message from Yvette Frazier sent at 1/15/2019 12:07 PM CST -----  Contact: pt  Name of Who is Calling: pt       What is the request in detail: pt needs to discuss lab results. For blood sugar and a1c. Running high. Call pt      Can the clinic reply by [facility]: no      What Number to Call Back if not in [facility]: [phone]

## 2019-02-27 ENCOUNTER — TELEPHONE (OUTPATIENT)
Dept: FAMILY MEDICINE | Facility: CLINIC | Age: 69
End: 2019-02-27

## 2019-02-27 NOTE — TELEPHONE ENCOUNTER
Spoke with pharmacy for was faxed over in the past to [phone] , per pharmacy they need to fax over to new number [phone] done .

## 2019-02-27 NOTE — TELEPHONE ENCOUNTER
----- Message from Lisy Valerio sent at 2/27/2019 11:01 AM CST -----  Contact: [facility]  Pharmacist calling to speak to a nurse regarding CMN form.         [facility] [address] [place] [address] [place] [address] [place] AT [address] [place]  [place] [address]  Phone: [phone] Fax: [phone]

## 2019-03-13 DIAGNOSIS — K21.9 GASTROESOPHAGEAL REFLUX DISEASE WITHOUT ESOPHAGITIS: ICD-10-CM

## 2019-03-13 NOTE — TELEPHONE ENCOUNTER
----- Message from Quinton Avelar sent at 3/13/2019 10:54 AM CDT -----  Contact: Self/[phone]  Type: RX Refill Request    Who Called: Patient    Refill or New Rx: Refill    RX Name and Strength:  ranitidine (ZANTAC) 300 MG tablet    Preferred Pharmacy with phone number:  .    [facility] [address] [place] [address] AT [address] [address]  Phone: [phone] Fax: [phone]    Local or Mail Order: Local    Thank you.

## 2019-04-10 DIAGNOSIS — I10 ESSENTIAL HYPERTENSION: ICD-10-CM

## 2019-04-11 RX ORDER — LISINOPRIL 5 MG/1
TABLET ORAL
Qty: 90 TABLET | Refills: 1 | Status: SHIPPED | OUTPATIENT
Start: 2019-04-11 | End: 2019-10-11 | Stop reason: SDUPTHER

## 2019-04-16 ENCOUNTER — TELEPHONE (OUTPATIENT)
Dept: FAMILY MEDICINE | Facility: CLINIC | Age: 69
End: 2019-04-16

## 2019-04-16 NOTE — TELEPHONE ENCOUNTER
Received order from [facility] pharmacy requesting test strips and lancets , order was faxed over to [phone] and [phone]. 04/15/19 - 04/16/19.

## 2019-04-17 ENCOUNTER — TELEPHONE (OUTPATIENT)
Dept: FAMILY MEDICINE | Facility: CLINIC | Age: 69
End: 2019-04-17

## 2019-04-17 NOTE — TELEPHONE ENCOUNTER
----- Message from Clarita Chang sent at 4/17/2019  9:15 AM CDT -----  Contact: Calli from [facility] [phone]  Type:  Pharmacy Calling to Clarify an RX    Name of Caller: Calli    Pharmacy Name: [facility]    Prescription Name: CMN form    What do they need to clarify? Calli from [facility] called to notify the staff that question number [phone] is missing on the patient's CMN form that was faxed over.    Best Call Back Number: [phone]

## 2019-04-26 DIAGNOSIS — E11.9 TYPE 2 DIABETES MELLITUS WITHOUT COMPLICATION: ICD-10-CM

## 2019-06-18 ENCOUNTER — TELEPHONE (OUTPATIENT)
Dept: FAMILY MEDICINE | Facility: CLINIC | Age: 69
End: 2019-06-18

## 2019-06-18 NOTE — TELEPHONE ENCOUNTER
Spoke with pt in regards of overdue A1c. Pt was scheduled for lab work for 6/25.     Next available OV isn't until 8/23 because of insurance. Pt wants sooner appointment. Please advise.

## 2019-06-22 DIAGNOSIS — E78.00 PURE HYPERCHOLESTEROLEMIA: ICD-10-CM

## 2019-06-25 ENCOUNTER — LAB VISIT (OUTPATIENT)
Dept: LAB | Facility: HOSPITAL | Age: 69
End: 2019-06-25
Attending: FAMILY MEDICINE
Payer: MEDICARE

## 2019-06-25 ENCOUNTER — OFFICE VISIT (OUTPATIENT)
Dept: FAMILY MEDICINE | Facility: CLINIC | Age: 69
End: 2019-06-25
Payer: MEDICARE

## 2019-06-25 VITALS
SYSTOLIC BLOOD PRESSURE: 124 MMHG | DIASTOLIC BLOOD PRESSURE: 68 MMHG | BODY MASS INDEX: 41.75 KG/M2 | WEIGHT: 315 LBS | TEMPERATURE: 98 F | RESPIRATION RATE: 16 BRPM | OXYGEN SATURATION: 95 % | HEART RATE: 64 BPM | HEIGHT: 73 IN

## 2019-06-25 DIAGNOSIS — E11.9 TYPE 2 DIABETES MELLITUS WITHOUT COMPLICATION: ICD-10-CM

## 2019-06-25 DIAGNOSIS — Z00.00 ENCOUNTER FOR PREVENTIVE HEALTH EXAMINATION: Primary | ICD-10-CM

## 2019-06-25 DIAGNOSIS — I10 ESSENTIAL HYPERTENSION: ICD-10-CM

## 2019-06-25 DIAGNOSIS — E78.00 PURE HYPERCHOLESTEROLEMIA: ICD-10-CM

## 2019-06-25 DIAGNOSIS — N18.30 TYPE 2 DIABETES MELLITUS WITH STAGE 3 CHRONIC KIDNEY DISEASE, WITHOUT LONG-TERM CURRENT USE OF INSULIN: ICD-10-CM

## 2019-06-25 DIAGNOSIS — E11.42 TYPE 2 DIABETES MELLITUS WITH DIABETIC POLYNEUROPATHY, WITHOUT LONG-TERM CURRENT USE OF INSULIN: ICD-10-CM

## 2019-06-25 DIAGNOSIS — E11.22 TYPE 2 DIABETES MELLITUS WITH STAGE 3 CHRONIC KIDNEY DISEASE, WITHOUT LONG-TERM CURRENT USE OF INSULIN: ICD-10-CM

## 2019-06-25 DIAGNOSIS — I70.0 AORTIC ATHEROSCLEROSIS: ICD-10-CM

## 2019-06-25 DIAGNOSIS — E66.01 MORBID OBESITY WITH BMI OF 45.0-49.9, ADULT: ICD-10-CM

## 2019-06-25 LAB
ANION GAP SERPL CALC-SCNC: 11 MMOL/L (ref 8–16)
BUN SERPL-MCNC: 20 MG/DL (ref 8–23)
CALCIUM SERPL-MCNC: 9.9 MG/DL (ref 8.7–10.5)
CHLORIDE SERPL-SCNC: 103 MMOL/L (ref 95–110)
CO2 SERPL-SCNC: 22 MMOL/L (ref 23–29)
CREAT SERPL-MCNC: 1.7 MG/DL (ref 0.5–1.4)
EST. GFR  (AFRICAN AMERICAN): 47.2 ML/MIN/1.73 M^2
EST. GFR  (NON AFRICAN AMERICAN): 40.8 ML/MIN/1.73 M^2
ESTIMATED AVG GLUCOSE: 128 MG/DL (ref 68–131)
GLUCOSE SERPL-MCNC: 127 MG/DL (ref 70–110)
HBA1C MFR BLD HPLC: 6.1 % (ref 4–5.6)
POTASSIUM SERPL-SCNC: 4.8 MMOL/L (ref 3.5–5.1)
SODIUM SERPL-SCNC: 136 MMOL/L (ref 136–145)

## 2019-06-25 PROCEDURE — 99999 PR PBB SHADOW E&M-EST. PATIENT-LVL V: ICD-10-PCS | Mod: PBBFAC,,, | Performed by: PHYSICIAN ASSISTANT

## 2019-06-25 PROCEDURE — G0439 PPPS, SUBSEQ VISIT: HCPCS | Mod: S$GLB,,, | Performed by: PHYSICIAN ASSISTANT

## 2019-06-25 PROCEDURE — 99215 OFFICE O/P EST HI 40 MIN: CPT | Mod: PBBFAC,PO | Performed by: PHYSICIAN ASSISTANT

## 2019-06-25 PROCEDURE — 36415 COLL VENOUS BLD VENIPUNCTURE: CPT | Mod: PO

## 2019-06-25 PROCEDURE — 83036 HEMOGLOBIN GLYCOSYLATED A1C: CPT

## 2019-06-25 PROCEDURE — G0439 PR MEDICARE ANNUAL WELLNESS SUBSEQUENT VISIT: ICD-10-PCS | Mod: S$GLB,,, | Performed by: PHYSICIAN ASSISTANT

## 2019-06-25 PROCEDURE — 80048 BASIC METABOLIC PNL TOTAL CA: CPT

## 2019-06-25 PROCEDURE — 99999 PR PBB SHADOW E&M-EST. PATIENT-LVL V: CPT | Mod: PBBFAC,,, | Performed by: PHYSICIAN ASSISTANT

## 2019-06-25 RX ORDER — ATORVASTATIN CALCIUM 40 MG/1
TABLET, FILM COATED ORAL
Qty: 90 TABLET | Refills: 3 | Status: SHIPPED | OUTPATIENT
Start: 2019-06-25 | End: 2019-06-28

## 2019-06-25 NOTE — PROGRESS NOTES
Diabetes Panel-Poor Control (A1c Every 3 Months)- getting done today   [facility] (Every 12 Months)- [facility]

## 2019-06-25 NOTE — PATIENT INSTRUCTIONS
Counseling and Referral of Other Preventative  (Italic type indicates deductible and co-insurance are waived)    Patient Name: Wale Calvo  Today's Date: 6/25/2019    Health Maintenance       Date Due Completion Date    Shingles Vaccine (1 of 2) 09/06/2001 ---    Foot Exam 08/25/2018 8/25/2017    Override on 6/10/2015: Done (today)    Eye Exam 03/22/2019 3/22/2018    Override on 11/11/2014: Done (future)    Hemoglobin A1c 04/14/2019 1/14/2019    Override on 11/11/2014: Done (future)    Influenza Vaccine 08/01/2019 1/14/2019    Override on 11/11/2014: Done (today)    Pneumococcal Vaccine (65+ Low/Medium Risk) (2 of 2 - PPSV23) 11/11/2019 6/28/2017    Lipid Panel 01/14/2020 1/14/2019    High Dose Statin 06/25/2020 6/25/2019    Aspirin/Antiplatelet Therapy 06/25/2020 6/25/2019    Colonoscopy 05/01/2027 5/1/2017    Override on 11/10/2015: Done    Override on 11/11/2014: Declined (had one done not due until couples more years)    TETANUS VACCINE 08/17/2027 8/17/2017        Orders Placed This Encounter   Procedures    Basic metabolic panel     The following information is provided to all patients.  This information is to help you find resources for any of the problems found today that may be affecting your health:                Living healthy guide: www.[facility].[place].gov      Understanding Diabetes: www.diabetes.org      Eating healthy: www.cdc.gov/healthyweight      CDC home safety checklist: www.cdc.gov/steadi/patient.html      Agency on Aging: www.goea.[place].[facility]      Alcoholics anonymous (AA): www.aa.org      Physical Activity: www.sunshine.nih.gov/nk8vygc      Tobacco use: www.quitwithusla.org

## 2019-06-25 NOTE — PROGRESS NOTES
"Wale Calvo presented for a  Medicare AWV and comprehensive Health Risk Assessment today. The following components were reviewed and updated:    · Medical history  · Family History  · Social history  · Allergies and Current Medications  · Health Risk Assessment  · Health Maintenance  · Care Team     ** See Completed Assessments for Annual Wellness Visit within the encounter summary.**       The following assessments were completed:  · Living Situation  · CAGE  · Depression Screening  · Timed Get Up and Go  · Whisper Test  · Cognitive Function Screening  · Nutrition Screening  · ADL Screening  · PAQ Screening    Vitals:    06/25/19 0908   BP: 124/68   Pulse: 64   Resp: 16   Temp: 98.1 °F (36.7 °C)   TempSrc: Oral   SpO2: 95%   Weight: (!) 159.6 kg (351 lb 13.7 oz)   Height: 6' 1" (1.854 m)     Body mass index is 46.42 kg/m².  Physical Exam   Cardiovascular:   Pulses:       Dorsalis pedis pulses are 2+ on the right side, and 1+ on the left side.   Feet:   Right Foot:   Protective Sensation: 10 sites tested. 1 site sensed.  Skin Integrity: Positive for callus.   Left Foot:   Protective Sensation: 10 sites tested. 3 sites sensed.   Skin Integrity: Positive for callus.         Diagnoses and health risks identified today and associated recommendations/orders:    1. Encounter for preventive health examination  Provided Wale with a 5-10 year written screening schedule and personal prevention plan. Recommendations were developed using the USPSTF age appropriate recommendations. Education, counseling, and referrals were provided as needed. After Visit Summary printed and given to patient which includes a list of additional screenings\tests needed.    2. Type 2 diabetes mellitus with diabetic polyneuropathy, without long-term current use of insulin  Needs better glucose control    3. Type 2 diabetes mellitus with stage 3 chronic kidney disease, without long-term current use of insulin  No NSAIDs, needs better glucose " control    4. Aortic atherosclerosis  Continue plavix and statin, f/u with card    5. Morbid obesity with BMI of 45.0-49.9, adult  DASh diet, increase exercise    6. Essential hypertension  - Basic metabolic panel; Future    7. Pure hypercholesterolemia  - atorvastatin (LIPITOR) 40 MG tablet; TAKE 1 TABLET(40 MG) BY MOUTH EVERY EVENING  Dispense: 90 tablet; Refill: 3      No follow-ups on file.    DENNIS TiradoC

## 2019-06-26 NOTE — TELEPHONE ENCOUNTER
Diabetes doing much better at 6.1 no need to repeat for 6 months. Office visit not needed at this time

## 2019-06-28 RX ORDER — ATORVASTATIN CALCIUM 40 MG/1
TABLET, FILM COATED ORAL
Qty: 90 TABLET | Refills: 1 | Status: SHIPPED | OUTPATIENT
Start: 2019-06-28 | End: 2019-10-11 | Stop reason: SDUPTHER

## 2019-07-08 ENCOUNTER — PATIENT OUTREACH (OUTPATIENT)
Dept: ADMINISTRATIVE | Facility: OTHER | Age: 69
End: 2019-07-08

## 2019-08-20 ENCOUNTER — PATIENT OUTREACH (OUTPATIENT)
Dept: ADMINISTRATIVE | Facility: OTHER | Age: 69
End: 2019-08-20

## 2019-08-26 ENCOUNTER — TELEPHONE (OUTPATIENT)
Dept: OPHTHALMOLOGY | Facility: CLINIC | Age: 69
End: 2019-08-26

## 2019-08-26 NOTE — TELEPHONE ENCOUNTER
----- Message from Serg Bliss sent at 8/26/2019 12:18 PM CDT -----  Contact: patient  Type:  Sooner Apoointment Request    Caller is requesting a sooner appointment.  Caller declined first available appointment listed below.  Caller will not accept being placed on the waitlist and is requesting a message be sent to doctor.  Name of Caller: Wale  When is the first available appointment? November 2019  Symptoms: routine eye exam  Would the patient rather a call back or a response via [facility]?  Call back  Best Call Back Number: [phone]  Additional Information:  no

## 2019-09-16 ENCOUNTER — PATIENT OUTREACH (OUTPATIENT)
Dept: ADMINISTRATIVE | Facility: OTHER | Age: 69
End: 2019-09-16

## 2019-09-18 ENCOUNTER — TELEPHONE (OUTPATIENT)
Dept: FAMILY MEDICINE | Facility: CLINIC | Age: 69
End: 2019-09-18

## 2019-09-18 DIAGNOSIS — G47.33 OBSTRUCTIVE SLEEP APNEA SYNDROME: Primary | ICD-10-CM

## 2019-09-18 NOTE — TELEPHONE ENCOUNTER
----- Message from Jem Ortega sent at 9/18/2019  9:36 AM CDT -----  Contact: Self  Type:  Patient Requesting Referral    Who Called:Self    Referral to What Specialty:Sleep Medicine    Reason for Referral:C-PAP (Sleep Study)    Does the patient want the referral with a specific physician?:Yes    Is the specialist [facility]    Would the patient rather a call back or a response via [facility]? Callback    Best Call Back Number:[phone]

## 2019-09-19 ENCOUNTER — OFFICE VISIT (OUTPATIENT)
Dept: OPHTHALMOLOGY | Facility: CLINIC | Age: 69
End: 2019-09-19
Payer: MEDICARE

## 2019-09-19 ENCOUNTER — HOSPITAL ENCOUNTER (OUTPATIENT)
Dept: RADIOLOGY | Facility: HOSPITAL | Age: 69
Discharge: HOME | End: 2019-09-19
Attending: NURSE PRACTITIONER
Payer: MEDICARE

## 2019-09-19 ENCOUNTER — OFFICE VISIT (OUTPATIENT)
Dept: FAMILY MEDICINE | Facility: CLINIC | Age: 69
End: 2019-09-19
Payer: MEDICARE

## 2019-09-19 VITALS
BODY MASS INDEX: 41.75 KG/M2 | DIASTOLIC BLOOD PRESSURE: 58 MMHG | SYSTOLIC BLOOD PRESSURE: 90 MMHG | TEMPERATURE: 98 F | WEIGHT: 315 LBS | HEIGHT: 73 IN | OXYGEN SATURATION: 95 % | RESPIRATION RATE: 16 BRPM

## 2019-09-19 DIAGNOSIS — I10 ESSENTIAL HYPERTENSION: ICD-10-CM

## 2019-09-19 DIAGNOSIS — H04.123 DRY EYE SYNDROME OF BOTH EYES: ICD-10-CM

## 2019-09-19 DIAGNOSIS — H43.812 VITREOUS DETACHMENT OF LEFT EYE: ICD-10-CM

## 2019-09-19 DIAGNOSIS — Z96.1 PSEUDOPHAKIA: ICD-10-CM

## 2019-09-19 DIAGNOSIS — E11.22 TYPE 2 DIABETES MELLITUS WITH STAGE 3 CHRONIC KIDNEY DISEASE, WITHOUT LONG-TERM CURRENT USE OF INSULIN: ICD-10-CM

## 2019-09-19 DIAGNOSIS — S99.922A FOOT INJURY, LEFT, INITIAL ENCOUNTER: Primary | ICD-10-CM

## 2019-09-19 DIAGNOSIS — S99.922A FOOT INJURY, LEFT, INITIAL ENCOUNTER: ICD-10-CM

## 2019-09-19 DIAGNOSIS — E11.9 DM TYPE 2 WITHOUT RETINOPATHY: ICD-10-CM

## 2019-09-19 DIAGNOSIS — H26.493 PCO (POSTERIOR CAPSULAR OPACIFICATION), BILATERAL: Primary | ICD-10-CM

## 2019-09-19 DIAGNOSIS — N18.30 TYPE 2 DIABETES MELLITUS WITH STAGE 3 CHRONIC KIDNEY DISEASE, WITHOUT LONG-TERM CURRENT USE OF INSULIN: ICD-10-CM

## 2019-09-19 PROBLEM — J30.2 SEASONAL ALLERGIES: Status: ACTIVE | Noted: 2019-09-19

## 2019-09-19 PROBLEM — K64.9 HEMORRHOIDS: Status: ACTIVE | Noted: 2019-09-19

## 2019-09-19 PROBLEM — K57.32 DIVERTICULITIS OF COLON: Status: ACTIVE | Noted: 2019-09-19

## 2019-09-19 PROCEDURE — 99214 OFFICE O/P EST MOD 30 MIN: CPT | Mod: PBBFAC,25,PO | Performed by: NURSE PRACTITIONER

## 2019-09-19 PROCEDURE — 99212 OFFICE O/P EST SF 10 MIN: CPT | Mod: PBBFAC,25,27,PO | Performed by: OPHTHALMOLOGY

## 2019-09-19 PROCEDURE — 99999 PR PBB SHADOW E&M-EST. PATIENT-LVL IV: ICD-10-PCS | Mod: PBBFAC,,, | Performed by: NURSE PRACTITIONER

## 2019-09-19 PROCEDURE — 99214 PR OFFICE/OUTPT VISIT, EST, LEVL IV, 30-39 MIN: ICD-10-PCS | Mod: S$PBB,,, | Performed by: NURSE PRACTITIONER

## 2019-09-19 PROCEDURE — 92014 COMPRE OPH EXAM EST PT 1/>: CPT | Mod: S$PBB,,, | Performed by: OPHTHALMOLOGY

## 2019-09-19 PROCEDURE — 73630 XR FOOT COMPLETE 3 VIEW LEFT: ICD-10-PCS | Mod: 26,LT,, | Performed by: RADIOLOGY

## 2019-09-19 PROCEDURE — 99999 PR PBB SHADOW E&M-EST. PATIENT-LVL IV: CPT | Mod: PBBFAC,,, | Performed by: NURSE PRACTITIONER

## 2019-09-19 PROCEDURE — 99999 PR PBB SHADOW E&M-EST. PATIENT-LVL II: CPT | Mod: PBBFAC,,, | Performed by: OPHTHALMOLOGY

## 2019-09-19 PROCEDURE — 92014 PR EYE EXAM, EST PATIENT,COMPREHESV: ICD-10-PCS | Mod: S$PBB,,, | Performed by: OPHTHALMOLOGY

## 2019-09-19 PROCEDURE — 73630 X-RAY EXAM OF FOOT: CPT | Mod: TC,FY,PO,LT

## 2019-09-19 PROCEDURE — 99999 PR PBB SHADOW E&M-EST. PATIENT-LVL II: ICD-10-PCS | Mod: PBBFAC,,, | Performed by: OPHTHALMOLOGY

## 2019-09-19 PROCEDURE — 99214 OFFICE O/P EST MOD 30 MIN: CPT | Mod: S$PBB,,, | Performed by: NURSE PRACTITIONER

## 2019-09-19 PROCEDURE — 73630 X-RAY EXAM OF FOOT: CPT | Mod: 26,LT,, | Performed by: RADIOLOGY

## 2019-09-19 RX ORDER — CLOPIDOGREL BISULFATE 75 MG/1
TABLET ORAL
Qty: 90 TABLET | Refills: 0 | Status: SHIPPED | OUTPATIENT
Start: 2019-09-19 | End: 2019-11-11 | Stop reason: SDUPTHER

## 2019-09-19 RX ORDER — TRIAMCINOLONE ACETONIDE 1 MG/G
CREAM TOPICAL
Refills: 2 | COMMUNITY
Start: 2019-07-26 | End: 2019-09-19

## 2019-09-19 RX ORDER — TRAMADOL HYDROCHLORIDE 50 MG/1
50 TABLET ORAL EVERY 6 HOURS PRN
Qty: 15 TABLET | Refills: 0 | Status: CANCELLED | OUTPATIENT
Start: 2019-09-19 | End: 2019-09-22

## 2019-09-19 RX ORDER — MUPIROCIN 20 MG/G
OINTMENT TOPICAL 2 TIMES DAILY
Qty: 30 G | Refills: 0 | Status: SHIPPED | OUTPATIENT
Start: 2019-09-19 | End: 2021-10-08

## 2019-09-19 RX ORDER — TRAMADOL HYDROCHLORIDE 50 MG/1
50 TABLET ORAL EVERY 6 HOURS PRN
Qty: 15 TABLET | Refills: 0 | Status: SHIPPED | OUTPATIENT
Start: 2019-09-19 | End: 2019-11-25 | Stop reason: CLARIF

## 2019-09-19 NOTE — PATIENT INSTRUCTIONS

## 2019-09-19 NOTE — PROGRESS NOTES
"Subjective:        Chief Complaint  Chief Complaint   Patient presents with    Foot Pain     Dropped object on foot yesterday.        HPI  Wale Holiday Umesh is a 69 y.o. male with multiple medical diagnoses as listed in the medical history and problem list that presents for left top of foot pain since yesterday.  Patient is new to me. A cinder block fell onto his foot from about 3-4 feet off of the ground while he was doing some work on his home yesterday afternoon. There was a small (<0.5cm) laceration from the impact to the top of his foot where it is significantly swollen and tender. He has been applying ice and elevating since. He has not taken anything for pain. He describes the pain from the foot as "electrifying". He has been able to walk on it without increased pain. He denies any numbness or tingling to the extremity.         PAST MEDICAL HISTORY:  Past Medical History:   Diagnosis Date    Coronary artery disease     Diabetes mellitus type II     Diabetes with neurologic complications     ED (erectile dysfunction)     GERD (gastroesophageal reflux disease)     Hyperlipidemia     Hypertension     Nuclear sclerosis of both eyes 7/1/2016    Obesity     Renal manifestation of secondary diabetes mellitus        PAST SURGICAL HISTORY:  Past Surgical History:   Procedure Laterality Date    CATARACT EXTRACTION W/  INTRAOCULAR LENS IMPLANT Left 08/16/2016    Dr. Martinez    CATARACT EXTRACTION W/  INTRAOCULAR LENS IMPLANT Right 08/30/2016    Dr. Martinez    CORONARY STENT PLACEMENT      INSERTION-INTRAOCULAR LENS (IOL) Right 8/30/2016    [facility]    INSERTION-INTRAOCULAR LENS (IOL) Left 8/16/2016    [facility]    JOINT REPLACEMENT      both knees    PHACOEMULSIFICATION-ASPIRATION-CATARACT Right 8/30/2016    [facility]    " PHACOEMULSIFICATION-ASPIRATION-CATARACT Left 8/16/2016    [facility] at [facility] OR [address] [place] [address]    SPINE SURGERY      discectomy       SOCIAL HISTORY:  Social History     Socioeconomic History    Marital status:      Spouse name: kolton    Number of children: 4    Years of education: Not on file    Highest education level: Not on file   Occupational History     Employer: [email or web address]   Social Needs    Financial resource strain: Not on file    Food insecurity:     Worry: Not on file     Inability: Not on file    Transportation needs:     Medical: Not on file     Non-medical: Not on file   Tobacco Use    Smoking status: Never Smoker    Smokeless tobacco: Never Used   Substance and Sexual Activity    Alcohol use: No    Drug use: No    Sexual activity: Not on file   Lifestyle    Physical activity:     Days per week: Not on file     Minutes per session: Not on file    Stress: Not on file   Relationships    Social connections:     Talks on phone: Not on file     Gets together: Not on file     Attends Worship service: Not on file     Active member of club or organization: Not on file     Attends meetings of clubs or organizations: Not on file     Relationship status: Not on file   Other Topics Concern    Not on file   Social History Narrative    Not on file       FAMILY HISTORY:  Family History   Problem Relation Age of Onset    Dementia Mother     Cataracts Mother     Heart disease Father     Alcohol abuse Brother     Drug abuse Daughter     No Known Problems Son     No Known Problems Daughter     No Known Problems Son     No Known Problems Sister     No Known Problems Maternal Aunt     No Known Problems Maternal Uncle     No Known Problems Paternal Aunt     No Known Problems Paternal Uncle     No Known Problems Maternal Grandmother     No Known Problems Maternal Grandfather     No Known Problems Paternal Grandmother     No Known Problems Paternal  Grandfather     Amblyopia Neg Hx     Blindness Neg Hx     Cancer Neg Hx     Diabetes Neg Hx     Glaucoma Neg Hx     Hypertension Neg Hx     Macular degeneration Neg Hx     Retinal detachment Neg Hx     Strabismus Neg Hx     Stroke Neg Hx     Thyroid disease Neg Hx        ALLERGIES AND MEDICATIONS: updated and reviewed.  Review of patient's allergies indicates:   Allergen Reactions    Penicillins Anaphylaxis     Current Outpatient Medications   Medication Sig Dispense Refill    atorvastatin (LIPITOR) 40 MG tablet TAKE 1 TABLET(40 MG) BY MOUTH EVERY EVENING 90 tablet 1    blood sugar diagnostic (CONTOUR NEXT TEST STRIPS) Strp TEST TWICE DAILY 100 strip 11    blood-glucose meter (ACCU-CHEK ADVANTAGE DIABETES) kit Use as instructed 1 each 0    clopidogrel (PLAVIX) 75 mg tablet TAKE 1 TABLET(75 MG) BY MOUTH EVERY DAY 90 tablet 0    glimepiride (AMARYL) 4 MG tablet TAKE 1 TABLET BY MOUTH TWICE DAILY BEFORE A MEAL 180 tablet 1    lancets (SAFETY LANCETS) 21 gauge Misc 1 lancet by Misc.(Non-Drug; Combo Route) route 2 (two) times daily. 100 each 11    lisinopril (PRINIVIL,ZESTRIL) 5 MG tablet TAKE 1 TABLET(5 MG) BY MOUTH EVERY EVENING 90 tablet 1    metFORMIN (GLUCOPHAGE) 1000 MG tablet Take 1 tablet (1,000 mg total) by mouth 2 (two) times daily with meals. 180 tablet 1    ranitidine (ZANTAC) 300 MG tablet TAKE 1 TABLET(300 MG) BY MOUTH TWICE DAILY 180 tablet 3    mupirocin (BACTROBAN) 2 % ointment Apply topically 2 (two) times daily. 30 g 0    traMADol (ULTRAM) 50 mg tablet Take 1 tablet (50 mg total) by mouth every 6 (six) hours as needed for Pain. 15 tablet 0     No current facility-administered medications for this visit.          ROS  Review of Systems   Constitutional: Negative for chills and fever.   Respiratory: Negative for shortness of breath.    Cardiovascular: Negative for chest pain.   Gastrointestinal: Negative for constipation, diarrhea, nausea and vomiting.   Musculoskeletal: Positive  "for gait problem (left foot pain).   Skin: Positive for wound (top of left foot).   Neurological: Negative for headaches.   Psychiatric/Behavioral: Negative for behavioral problems and confusion.         Objective:     Physical Exam  Vitals:    09/19/19 1309   BP: (!) 90/58   BP Location: Right arm   Patient Position: Sitting   BP Method: Thigh Cuff (Manual)   Resp: 16   Temp: 98 °F (36.7 °C)   TempSrc: Oral   SpO2: 95%   Weight: (!) 160.6 kg (354 lb 0.9 oz)   Height: 6' 1" (1.854 m)    Body mass index is 46.71 kg/m².  Weight: (!) 160.6 kg (354 lb 0.9 oz)   Height: 6' 1" (185.4 cm)   Physical Exam   Constitutional: He appears well-developed and well-nourished. No distress.   HENT:   Head: Normocephalic and atraumatic.   Eyes: Pupils are equal, round, and reactive to light. Conjunctivae and EOM are normal.   Cardiovascular: Normal rate.   Musculoskeletal: He exhibits tenderness.        Left foot: There is decreased range of motion, tenderness, swelling and laceration (as depicted <0.5cm). There is no deformity.        Feet:    Unable to feel pulses of the left foot due to swelling. Cap refill WNL, good coloration.   Neurological: He is alert. He displays normal reflexes. No cranial nerve deficit or sensory deficit. He exhibits normal muscle tone.   Skin: Skin is warm and dry. No rash noted.   Psychiatric: He has a normal mood and affect. His behavior is normal.       Assessment:     1. Foot injury, left, initial encounter    2. Type 2 diabetes mellitus with stage 3 chronic kidney disease, without long-term current use of insulin    3. Essential hypertension      Plan:     Wale was seen today for foot pain.    Diagnoses and all orders for this visit:    Foot injury, left, initial encounter  Type 2 diabetes mellitus with stage 3 chronic kidney disease, without long-term current use of insulin   -     X-Ray Foot Complete Left; Future  -     traMADol (ULTRAM) 50 mg tablet; Take 1 tablet (50 mg total) by mouth every 6 " (six) hours as needed for Pain.  -     mupirocin (BACTROBAN) 2 % ointment; Apply topically 2 (two) times daily.  -     X-ray to be completed today. Encouraged RICE  -     Counseled to avoid NSAIDs due to decreased kidney function  -     Will follow closely due to DMII    Essential hypertension         -    BP controlled presently - reviewed anti-hypertensive regimen - continue current therapy      Health Maintenance       Date Due Completion Date    Shingles Vaccine (1 of 2) 09/06/2001 ---    Influenza Vaccine (1) 09/01/2019 1/14/2019    Override on 11/11/2014: Done (today)    Hemoglobin A1c 09/25/2019 6/25/2019    Override on 11/11/2014: Done (future)    Eye Exam 09/23/2019 (Originally 3/22/2019) 3/22/2018    Pneumococcal Vaccine (65+ Low/Medium Risk) (2 of 2 - PPSV23) 11/11/2019 6/28/2017    Lipid Panel 01/14/2020 1/14/2019    Foot Exam 06/25/2020 6/25/2019    Override on 6/10/2015: Done (today)    High Dose Statin 06/28/2020 6/28/2019    Aspirin/Antiplatelet Therapy 09/19/2020 9/19/2019    Colonoscopy 05/01/2027 5/1/2017    Override on 11/10/2015: Done    Override on 11/11/2014: Declined (had one done not due until couples more years)    TETANUS VACCINE 08/17/2027 8/17/2017          Health Maintenance reviewed, addressed as per orders    Follow-up: Follow up in about 1 week (around 9/26/2019), or if symptoms worsen or fail to improve.    The patient expressed understanding and no barriers to adherence were identified.     1. The patient indicates understanding of these issues and agrees with the plan. Brief care plan is updated and reviewed with the patient as applicable.     2. The patient is given an After Visit Summary that lists all medications with directions, allergies, orders placed during this encounter and follow-up instructions.     3. I have reviewed the patient's medical information including past medical, family, and social history sections including the medications and allergies.     4. We discussed  the patient's current medications. I reconciled the patient's medication list and prepared and supplied needed refills.       Kemi Montenegro, DNP, APRN, FNP-c  Family Medicine    My collaborating physicians are Dr. Vasquez Kruse and Dr. Clyde Syed

## 2019-09-19 NOTE — PROGRESS NOTES
Subjective:       Patient ID: Wale Calvo is a 69 y.o. male.    Chief Complaint: Eye Exam    HPI     DSL- 3/22/18     70 y/o male is here for f/u. Pt c/o of discharge in eyes. Occas dry eyes.   Floaters bilateral. BSL was 145 this morning.     Eyemeds  Refresh OU PRN    Hemoglobin A1C       Date                     Value               Ref Range             Status                06/25/2019               6.1 (H)             4.0 - 5.6 %           Final                  Last edited by Akila Beaulieu on 9/19/2019  3:42 PM. (History)             Assessment:       1. PCO (posterior capsular opacification), bilateral    2. Dry eye syndrome of both eyes    3. Vitreous detachment of left eye    4. DM type 2 without retinopathy    5. Essential hypertension    6. Pseudophakia        Plan:       Early PCO OU- Not Visually Significant.     MAXIMILIAN-Needs AT's.  PVD OS-Stable.  DM-No NPDR OU.  HTN-No retinopathy OU.        AT's.  Control DM & HTN.  TC 1 yr.

## 2019-09-20 ENCOUNTER — TELEPHONE (OUTPATIENT)
Dept: FAMILY MEDICINE | Facility: CLINIC | Age: 69
End: 2019-09-20

## 2019-09-20 NOTE — TELEPHONE ENCOUNTER
I had released his results via the portal.    There is no signs of fracture on his x-ray. Continue with the plan as we discussed. Please let me know how he is doing.

## 2019-09-20 NOTE — TELEPHONE ENCOUNTER
Pt was advised and verbalized understanding. Pt states that the medication is working and helping him sleep at night.

## 2019-09-20 NOTE — TELEPHONE ENCOUNTER
----- Message from Jem Ortega sent at 9/20/2019  2:07 PM CDT -----  Contact: sELF  Type:  Test Results    Who Called: Self    Name of Test (Lab/Mammo/Etc): X-Ray    Date of Test: 09/19/2019    Ordering Provider: CATHY Montenegro NP    [facility]    Would the patient rather a call back or a response via [facility]? Call    Best Call Back Number: [phone]

## 2019-10-02 ENCOUNTER — TELEPHONE (OUTPATIENT)
Dept: FAMILY MEDICINE | Facility: CLINIC | Age: 69
End: 2019-10-02

## 2019-10-02 NOTE — TELEPHONE ENCOUNTER
Patient received text message regarding confirming an appt for today for earlier slow for 3:20pm. Notified slot was taken. Offered next available but patient stated unable to make it due to being out of town. Patient will keep appt for 11/2019.

## 2019-10-11 ENCOUNTER — TELEPHONE (OUTPATIENT)
Dept: FAMILY MEDICINE | Facility: CLINIC | Age: 69
End: 2019-10-11

## 2019-10-11 DIAGNOSIS — E78.00 PURE HYPERCHOLESTEROLEMIA: ICD-10-CM

## 2019-10-11 DIAGNOSIS — G47.33 OBSTRUCTIVE SLEEP APNEA SYNDROME: Primary | ICD-10-CM

## 2019-10-11 DIAGNOSIS — I10 ESSENTIAL HYPERTENSION: ICD-10-CM

## 2019-10-11 RX ORDER — LISINOPRIL 5 MG/1
TABLET ORAL
Qty: 90 TABLET | Refills: 3 | Status: SHIPPED | OUTPATIENT
Start: 2019-10-11 | End: 2020-01-08 | Stop reason: SDUPTHER

## 2019-10-11 RX ORDER — ATORVASTATIN CALCIUM 40 MG/1
40 TABLET, FILM COATED ORAL DAILY
Qty: 90 TABLET | Refills: 3 | Status: SHIPPED | OUTPATIENT
Start: 2019-10-11 | End: 2020-12-03 | Stop reason: SDUPTHER

## 2019-10-11 NOTE — TELEPHONE ENCOUNTER
----- Message from Yvette Frazier sent at 10/11/2019 10:15 AM CDT -----  Contact: pt  Type: Patient Call Back    Who called:pt    What is the request in detail:pt is requesting to have referral for pulmonary sent to [facility]. Fax no [phone] phone [phone]    Can the clinic reply by [facility]?    Would the patient rather a call back or a response via [facility]? Call back    Best call back number:[phone]    Additional Information:

## 2019-10-11 NOTE — TELEPHONE ENCOUNTER
Pt needs referral to pulmonary for sleep apnea; states he needs cpap supplies and per medicare it needs to come from pulmonologist

## 2019-10-11 NOTE — TELEPHONE ENCOUNTER
----- Message from Yvette Frazier sent at 10/11/2019 10:11 AM CDT -----  Contact: pt  Type: RX Refill Request        Who Called: pt    Have you contacted your pharmacy:    Refill or New Rx:lisinopril (PRINIVIL,ZESTRIL) 5 MG tablet       atorvastatin (LIPITOR) 40 MG tablet   RX Name and Strength:    How is the patient currently taking it? (ex. 1XDay):    Is this a 30 day or 90 day RX:    Preferred Pharmacy with phone number:  [id number] - [address] AT [address] [address]  Phone: [phone] Fax: [phone]          Local or Mail Order:    Ordering Provider:    Would the patient rather a call back or a response via [facility]? Call back    Best Call Back Number:[phone]    Additional Information:

## 2019-10-14 ENCOUNTER — TELEPHONE (OUTPATIENT)
Dept: FAMILY MEDICINE | Facility: CLINIC | Age: 69
End: 2019-10-14

## 2019-10-14 NOTE — TELEPHONE ENCOUNTER
----- Message from Jem Ortega sent at 10/14/2019  3:25 PM CDT -----  Contact: Miquel Benoit  Type: Patient Call Back    Who called:Kaycee    What is the request in detail: She states the received the referral. They just need clinic notes.    Can the clinic reply by [facility]?No    Would the patient rather a call back or a response via [facility]? Call    Best call back number:[phone] fax: [phone]

## 2019-10-28 ENCOUNTER — PATIENT OUTREACH (OUTPATIENT)
Dept: ADMINISTRATIVE | Facility: HOSPITAL | Age: 69
End: 2019-10-28

## 2019-10-28 ENCOUNTER — TELEPHONE (OUTPATIENT)
Dept: ADMINISTRATIVE | Facility: HOSPITAL | Age: 69
End: 2019-10-28

## 2019-10-28 DIAGNOSIS — E11.9 DIABETES MELLITUS WITHOUT COMPLICATION: Primary | ICD-10-CM

## 2019-11-07 DIAGNOSIS — E11.65 UNCONTROLLED TYPE 2 DIABETES MELLITUS WITH HYPERGLYCEMIA: ICD-10-CM

## 2019-11-07 DIAGNOSIS — E11.9 TYPE 2 DIABETES MELLITUS WITHOUT COMPLICATION, WITHOUT LONG-TERM CURRENT USE OF INSULIN: ICD-10-CM

## 2019-11-07 RX ORDER — LANCETS 21 GAUGE
1 EACH MISCELLANEOUS 2 TIMES DAILY
Qty: 100 EACH | Refills: 11 | Status: SHIPPED | OUTPATIENT
Start: 2019-11-07

## 2019-11-11 ENCOUNTER — TELEPHONE (OUTPATIENT)
Dept: FAMILY MEDICINE | Facility: CLINIC | Age: 69
End: 2019-11-11

## 2019-11-11 ENCOUNTER — OFFICE VISIT (OUTPATIENT)
Dept: FAMILY MEDICINE | Facility: CLINIC | Age: 69
End: 2019-11-11
Payer: MEDICARE

## 2019-11-11 ENCOUNTER — LAB VISIT (OUTPATIENT)
Dept: LAB | Facility: HOSPITAL | Age: 69
End: 2019-11-11
Payer: MEDICARE

## 2019-11-11 VITALS
RESPIRATION RATE: 16 BRPM | SYSTOLIC BLOOD PRESSURE: 114 MMHG | HEIGHT: 73 IN | DIASTOLIC BLOOD PRESSURE: 84 MMHG | WEIGHT: 315 LBS | TEMPERATURE: 98 F | HEART RATE: 62 BPM | BODY MASS INDEX: 41.75 KG/M2 | OXYGEN SATURATION: 97 %

## 2019-11-11 DIAGNOSIS — E11.22 TYPE 2 DIABETES MELLITUS WITH STAGE 3 CHRONIC KIDNEY DISEASE, WITHOUT LONG-TERM CURRENT USE OF INSULIN: ICD-10-CM

## 2019-11-11 DIAGNOSIS — N18.30 TYPE 2 DIABETES MELLITUS WITH STAGE 3 CHRONIC KIDNEY DISEASE, WITHOUT LONG-TERM CURRENT USE OF INSULIN: ICD-10-CM

## 2019-11-11 DIAGNOSIS — E11.9 DIABETES MELLITUS WITHOUT COMPLICATION: ICD-10-CM

## 2019-11-11 DIAGNOSIS — I25.10 CORONARY ARTERY DISEASE INVOLVING NATIVE CORONARY ARTERY OF NATIVE HEART WITHOUT ANGINA PECTORIS: Primary | ICD-10-CM

## 2019-11-11 DIAGNOSIS — G47.33 OBSTRUCTIVE SLEEP APNEA SYNDROME: ICD-10-CM

## 2019-11-11 DIAGNOSIS — Z23 NEED FOR INFLUENZA VACCINATION: ICD-10-CM

## 2019-11-11 DIAGNOSIS — I25.10 CORONARY ARTERY DISEASE INVOLVING NATIVE CORONARY ARTERY OF NATIVE HEART WITHOUT ANGINA PECTORIS: ICD-10-CM

## 2019-11-11 DIAGNOSIS — K21.9 GASTROESOPHAGEAL REFLUX DISEASE WITHOUT ESOPHAGITIS: ICD-10-CM

## 2019-11-11 LAB
ANION GAP SERPL CALC-SCNC: 9 MMOL/L (ref 8–16)
BUN SERPL-MCNC: 25 MG/DL (ref 8–23)
CALCIUM SERPL-MCNC: 8.9 MG/DL (ref 8.7–10.5)
CHLORIDE SERPL-SCNC: 109 MMOL/L (ref 95–110)
CHOLEST SERPL-MCNC: 122 MG/DL (ref 120–199)
CHOLEST/HDLC SERPL: 3.8 {RATIO} (ref 2–5)
CO2 SERPL-SCNC: 25 MMOL/L (ref 23–29)
CREAT SERPL-MCNC: 1.6 MG/DL (ref 0.5–1.4)
EST. GFR  (AFRICAN AMERICAN): 50.4 ML/MIN/1.73 M^2
EST. GFR  (NON AFRICAN AMERICAN): 43.6 ML/MIN/1.73 M^2
ESTIMATED AVG GLUCOSE: 123 MG/DL (ref 68–131)
GLUCOSE SERPL-MCNC: 96 MG/DL (ref 70–110)
HBA1C MFR BLD HPLC: 5.9 % (ref 4–5.6)
HDLC SERPL-MCNC: 32 MG/DL (ref 40–75)
HDLC SERPL: 26.2 % (ref 20–50)
LDLC SERPL CALC-MCNC: 74.4 MG/DL (ref 63–159)
NONHDLC SERPL-MCNC: 90 MG/DL
POTASSIUM SERPL-SCNC: 4.6 MMOL/L (ref 3.5–5.1)
SODIUM SERPL-SCNC: 143 MMOL/L (ref 136–145)
TRIGL SERPL-MCNC: 78 MG/DL (ref 30–150)

## 2019-11-11 PROCEDURE — 99214 PR OFFICE/OUTPT VISIT, EST, LEVL IV, 30-39 MIN: ICD-10-PCS | Mod: S$PBB,,, | Performed by: FAMILY MEDICINE

## 2019-11-11 PROCEDURE — 99213 OFFICE O/P EST LOW 20 MIN: CPT | Mod: PBBFAC,PO | Performed by: FAMILY MEDICINE

## 2019-11-11 PROCEDURE — 99999 PR PBB SHADOW E&M-EST. PATIENT-LVL III: CPT | Mod: PBBFAC,,, | Performed by: FAMILY MEDICINE

## 2019-11-11 PROCEDURE — 80048 BASIC METABOLIC PNL TOTAL CA: CPT

## 2019-11-11 PROCEDURE — 83036 HEMOGLOBIN GLYCOSYLATED A1C: CPT

## 2019-11-11 PROCEDURE — 90662 IIV NO PRSV INCREASED AG IM: CPT | Mod: PBBFAC,PO

## 2019-11-11 PROCEDURE — 36415 COLL VENOUS BLD VENIPUNCTURE: CPT | Mod: PO

## 2019-11-11 PROCEDURE — 99214 OFFICE O/P EST MOD 30 MIN: CPT | Mod: S$PBB,,, | Performed by: FAMILY MEDICINE

## 2019-11-11 PROCEDURE — 80061 LIPID PANEL: CPT

## 2019-11-11 PROCEDURE — 99999 PR PBB SHADOW E&M-EST. PATIENT-LVL III: ICD-10-PCS | Mod: PBBFAC,,, | Performed by: FAMILY MEDICINE

## 2019-11-11 RX ORDER — GLIMEPIRIDE 4 MG/1
TABLET ORAL
Qty: 180 TABLET | Refills: 1 | Status: SHIPPED | OUTPATIENT
Start: 2019-11-11 | End: 2020-12-15 | Stop reason: SDUPTHER

## 2019-11-11 RX ORDER — CLOPIDOGREL BISULFATE 75 MG/1
TABLET ORAL
Qty: 90 TABLET | Refills: 1 | Status: SHIPPED | OUTPATIENT
Start: 2019-11-11 | End: 2019-12-16

## 2019-11-11 RX ORDER — METFORMIN HYDROCHLORIDE 1000 MG/1
1000 TABLET ORAL 2 TIMES DAILY WITH MEALS
Qty: 180 TABLET | Refills: 1 | Status: SHIPPED | OUTPATIENT
Start: 2019-11-11 | End: 2021-02-15 | Stop reason: SDUPTHER

## 2019-11-11 NOTE — TELEPHONE ENCOUNTER
----- Message from Yvette Freddie sent at 11/11/2019  4:19 PM CST -----  Contact: [facility] [phone] x [phone]  Type: Patient Call Back    Who called:[facility] [phone]    What is the request in detail:refaxing diabetic form. fyi    Can the clinic reply by [facility]?    Would the patient rather a call back or a response via [facility]? Call back    Best call back number:    Additional Information:

## 2019-11-11 NOTE — PROGRESS NOTES
Health Maintenance Due   Topic     Hemoglobin A1c  Scheduled to get today     Pneumococcal Vaccine (65+ High/Highest Risk) (2 of 2 - PPSV23) Postpone until next visit      Flu Vaccine: ok to get today  Zoster: no hx chickenpox

## 2019-11-11 NOTE — PROGRESS NOTES
Chief Complaint   Patient presents with    Consult     prostate check - requesting US        Wale Calvo is a 69 y.o. male who presents per the Chief Complaint.  Pt is known to me and was last seen by me on 9/5/2018.  All known chronic medical issues have been documented.       Diabetes   He presents for his follow-up diabetic visit. He has gestational diabetes mellitus. No MedicAlert identification noted. The initial diagnosis of diabetes was made 5 years ago. His disease course has been stable. There are no hypoglycemic associated symptoms. Pertinent negatives for hypoglycemia include no confusion, dizziness, headaches, hunger, nervousness/anxiousness, pallor, seizures, sleepiness, sweats or tremors. Associated symptoms include foot paresthesias. Pertinent negatives for diabetes include no blurred vision, no chest pain, no fatigue, no foot ulcerations, no polydipsia, no polyphagia, no polyuria, no visual change and no weakness. There are no hypoglycemic complications. Symptoms are stable. Diabetic complications include peripheral neuropathy. Pertinent negatives for diabetic complications include no autonomic neuropathy, CVA, heart disease, impotence, nephropathy, PVD or retinopathy. Risk factors for coronary artery disease include diabetes mellitus. Current diabetic treatment includes oral agent (dual therapy). He is compliant with treatment all of the time. His weight is decreasing rapidly. He is following a generally healthy diet. When asked about meal planning, he reported none. He has had a previous visit with a dietitian. He participates in exercise three times a week. He monitors blood glucose at home 1-2 x per day. Blood glucose monitoring compliance is excellent. His home blood glucose trend is decreasing steadily. An ACE inhibitor/angiotensin II receptor blocker is being taken. He does not see a podiatrist.Eye exam is current.        ROS  Review of Systems   Constitutional: Negative.  Negative  "for activity change, appetite change, chills, diaphoresis, fatigue, fever and unexpected weight change.   HENT: Negative.  Negative for congestion, ear pain, hearing loss, nosebleeds, postnasal drip, rhinorrhea, sinus pressure, sneezing, sore throat and trouble swallowing.    Eyes: Negative for blurred vision, pain and visual disturbance.   Respiratory: Negative for cough, choking and shortness of breath.    Cardiovascular: Negative for chest pain and leg swelling.   Gastrointestinal: Negative for abdominal pain, constipation, diarrhea, nausea and vomiting.   Endocrine: Negative for polydipsia, polyphagia and polyuria.   Genitourinary: Negative for decreased urine volume, difficulty urinating, discharge, dysuria, enuresis, flank pain, frequency, genital sores, hematuria, impotence, penile pain, penile swelling, scrotal swelling, testicular pain and urgency.        Abnormal sensation below scrotum   Musculoskeletal: Positive for arthralgias (foot pain). Negative for back pain, gait problem, joint swelling and myalgias.   Skin: Negative.  Negative for pallor.   Allergic/Immunologic: Negative for environmental allergies and food allergies.   Neurological: Negative.  Negative for dizziness, tremors, seizures, syncope, weakness, light-headedness and headaches.   Psychiatric/Behavioral: Negative.  Negative for confusion, decreased concentration, dysphoric mood and sleep disturbance. The patient is not nervous/anxious.        Physical Exam  Vitals:    11/11/19 1052   BP: 114/84   Pulse: 62   Resp: 16   Temp: 98.3 °F (36.8 °C)    Body mass index is 46.51 kg/m².  Weight: (!) 159.9 kg (352 lb 8.3 oz)   Height: 6' 1" (185.4 cm)     Physical Exam   Constitutional: He is oriented to person, place, and time. He appears well-developed and well-nourished. He is active and cooperative.  Non-toxic appearance. He does not have a sickly appearance. He does not appear ill. No distress.   HENT:   Head: Normocephalic and atraumatic. "   Right Ear: Hearing and external ear normal. No decreased hearing is noted.   Left Ear: Hearing and external ear normal. No decreased hearing is noted.   Nose: Nose normal. No rhinorrhea or nasal deformity.   Mouth/Throat: Uvula is midline and oropharynx is clear and moist. He does not have dentures. Normal dentition.   Eyes: Pupils are equal, round, and reactive to light. Conjunctivae, EOM and lids are normal. Right eye exhibits no chemosis, no discharge and no exudate. No foreign body present in the right eye. Left eye exhibits no chemosis, no discharge and no exudate. No foreign body present in the left eye. No scleral icterus.   Neck: Normal range of motion and full passive range of motion without pain. Neck supple.   Cardiovascular: Normal rate, regular rhythm, S1 normal, S2 normal and normal heart sounds. Exam reveals no gallop and no friction rub.   No murmur heard.  Pulmonary/Chest: Effort normal and breath sounds normal. No accessory muscle usage. No respiratory distress. He has no decreased breath sounds. He has no wheezes. He has no rhonchi. He has no rales.   Abdominal: Soft. Normal appearance. He exhibits no distension. There is no hepatosplenomegaly. There is no tenderness. There is no rigidity, no rebound and no guarding.   Musculoskeletal: Normal range of motion.   Neurological: He is alert and oriented to person, place, and time. He has normal strength. No cranial nerve deficit or sensory deficit. He exhibits normal muscle tone. He displays no seizure activity. Coordination and gait normal.   Skin: Skin is warm, dry and intact. No rash noted. He is not diaphoretic.   Psychiatric: He has a normal mood and affect. His speech is normal and behavior is normal. Judgment and thought content normal. Cognition and memory are normal. He is attentive.       Assessment & Plan    Discussion of plan of care including treatment options regarding health and wellness were reviewed and discussed with patient.  Any  changes to medication or treatment plan, as well as any screening blood test, imaging, or referrals to specialist, are documented.  Follow up as indicated.  Patient encouraged to follow up with his Urologist to discuss concerns for prostate.    1. Coronary artery disease involving native coronary artery of native heart without angina pectoris  Stable; no therapeutic changes at this time.  Managed by Cardiology.  The current medical regimen will be continued at this time as discussed.  Screening test will be ordered and once results available patient will be notified of results and managed accordingly.    - clopidogrel (PLAVIX) 75 mg tablet; TAKE 1 TABLET(75 MG) BY MOUTH EVERY DAY  Dispense: 90 tablet; Refill: 1  - Lipid panel; Future    2. Type 2 diabetes mellitus with stage 3 chronic kidney disease, without long-term current use of insulin  Patient is encouraged to follow a diet low in carbohydrates and simple sugars.  Discussed simple vs. complex carbohydrates as well as eating times of certain meals. Advised to focus on good food choices and increased physical activity and encouraged to adhere to medication regimen and/or lifestyle adjustme nts, and to check glucose level as recommended.  Contact office if glucose levels are not improving over time.  Screening blood test is due at this time.      - metFORMIN (GLUCOPHAGE) 1000 MG tablet; Take 1 tablet (1,000 mg total) by mouth 2 (two) times daily with meals.  Dispense: 180 tablet; Refill: 1  - glimepiride (AMARYL) 4 MG tablet; TAKE 1 TABLET BY MOUTH TWICE DAILY BEFORE A MEAL  Dispense: 180 tablet; Refill: 1  - Basic metabolic panel; Future  - Lipid panel; Future    3. Gastroesophageal reflux disease without esophagitis  Patient was advised to continue medication and to decrease intake of caffeine and spicy foods and to elevate head while lying down.  Also advised to avoid lying down within 3 hours of last meal.  If symptoms continue with treatment, follow up for  further evaluation.       4. Obstructive sleep apnea syndrome  Scheduled for PSG this week; will order CPAP based on results.    5. Need for influenza vaccination  Patient requested Flu vaccine, and was consented and vaccine given in office without complication.   - Influenza - High Dose (65+) (PF) (IM)       Follow up in about 6 months (around 5/11/2020), or if symptoms worsen or fail to improve.        ACTIVE MEDICAL ISSUES:  Documented in Problem List    PAST MEDICAL HISTORY  Documented    PAST SURGICAL HISTORY:  Documented    SOCIAL HISTORY:  Documented    FAMILY HISTORY:  Documented    ALLERGIES AND MEDICATIONS: updated and reviewed.  Documented    Health Maintenance       Date Due Completion Date    Shingles Vaccine (1 of 2) 09/06/2001 ---    Influenza Vaccine (1) 09/01/2019 1/14/2019    Hemoglobin A1c 09/25/2019 6/25/2019    Override on 11/11/2014: Done (future)    Pneumococcal Vaccine (65+ High/Highest Risk) (2 of 2 - PPSV23) 11/11/2019 6/28/2017    Lipid Panel 01/14/2020 1/14/2019    Foot Exam 06/25/2020 6/25/2019    Colonoscopy 07/25/2020 7/25/2017    Aspirin/Antiplatelet Therapy 09/19/2020 9/19/2019    Eye Exam 09/19/2020 9/19/2019    High Dose Statin 10/11/2020 10/11/2019    TETANUS VACCINE 08/17/2027 8/17/2017

## 2019-11-11 NOTE — TELEPHONE ENCOUNTER
Returned ph call to pharmacy leave message to call clinic , DM form was faxed over today to [phone].also will faxed over to local pharmacy .# [phone]

## 2019-11-13 DIAGNOSIS — E11.9 TYPE 2 DIABETES MELLITUS WITHOUT COMPLICATION, WITHOUT LONG-TERM CURRENT USE OF INSULIN: ICD-10-CM

## 2019-11-13 RX ORDER — GLIMEPIRIDE 4 MG/1
TABLET ORAL
Qty: 180 TABLET | Refills: 0 | OUTPATIENT
Start: 2019-11-13

## 2019-11-20 DIAGNOSIS — K21.9 GASTROESOPHAGEAL REFLUX DISEASE WITHOUT ESOPHAGITIS: ICD-10-CM

## 2019-11-25 ENCOUNTER — HOSPITAL ENCOUNTER (EMERGENCY)
Facility: HOSPITAL | Age: 69
Discharge: HOME | End: 2019-11-25
Attending: EMERGENCY MEDICINE
Payer: MEDICARE

## 2019-11-25 VITALS
RESPIRATION RATE: 18 BRPM | HEART RATE: 68 BPM | SYSTOLIC BLOOD PRESSURE: 100 MMHG | DIASTOLIC BLOOD PRESSURE: 68 MMHG | OXYGEN SATURATION: 100 % | HEIGHT: 73 IN | TEMPERATURE: 98 F | WEIGHT: 315 LBS | BODY MASS INDEX: 41.75 KG/M2

## 2019-11-25 DIAGNOSIS — K57.90 DIVERTICULOSIS: ICD-10-CM

## 2019-11-25 DIAGNOSIS — R10.9 LEFT FLANK PAIN: Primary | ICD-10-CM

## 2019-11-25 DIAGNOSIS — N20.0 NEPHROLITHIASIS: ICD-10-CM

## 2019-11-25 LAB
ALBUMIN SERPL BCP-MCNC: 4.3 G/DL (ref 3.5–5.2)
ALP SERPL-CCNC: 105 U/L (ref 55–135)
ALT SERPL W/O P-5'-P-CCNC: 24 U/L (ref 10–44)
ANION GAP SERPL CALC-SCNC: 9 MMOL/L (ref 8–16)
AST SERPL-CCNC: 21 U/L (ref 10–40)
BASOPHILS # BLD AUTO: 0.1 K/UL (ref 0–0.2)
BASOPHILS NFR BLD: 1.2 % (ref 0–1.9)
BILIRUB SERPL-MCNC: 1.2 MG/DL (ref 0.1–1)
BILIRUB UR QL STRIP: NEGATIVE
BUN SERPL-MCNC: 22 MG/DL (ref 8–23)
CALCIUM SERPL-MCNC: 9.2 MG/DL (ref 8.7–10.5)
CHLORIDE SERPL-SCNC: 108 MMOL/L (ref 95–110)
CLARITY UR: CLEAR
CO2 SERPL-SCNC: 23 MMOL/L (ref 23–29)
COLOR UR: YELLOW
CREAT SERPL-MCNC: 1.6 MG/DL (ref 0.5–1.4)
DIFFERENTIAL METHOD: ABNORMAL
EOSINOPHIL # BLD AUTO: 0.3 K/UL (ref 0–0.5)
EOSINOPHIL NFR BLD: 3.3 % (ref 0–8)
ERYTHROCYTE [DISTWIDTH] IN BLOOD BY AUTOMATED COUNT: 13.2 % (ref 11.5–14.5)
EST. GFR  (AFRICAN AMERICAN): 50 ML/MIN/1.73 M^2
EST. GFR  (NON AFRICAN AMERICAN): 44 ML/MIN/1.73 M^2
GLUCOSE SERPL-MCNC: 110 MG/DL (ref 70–110)
GLUCOSE UR QL STRIP: NEGATIVE
HCT VFR BLD AUTO: 43.3 % (ref 40–54)
HGB BLD-MCNC: 14.5 G/DL (ref 14–18)
HGB UR QL STRIP: NEGATIVE
IMM GRANULOCYTES # BLD AUTO: 0.04 K/UL (ref 0–0.04)
IMM GRANULOCYTES NFR BLD AUTO: 0.5 % (ref 0–0.5)
KETONES UR QL STRIP: NEGATIVE
LEUKOCYTE ESTERASE UR QL STRIP: NEGATIVE
LIPASE SERPL-CCNC: 35 U/L (ref 4–60)
LYMPHOCYTES # BLD AUTO: 0.9 K/UL (ref 1–4.8)
LYMPHOCYTES NFR BLD: 10.6 % (ref 18–48)
MCH RBC QN AUTO: 30.1 PG (ref 27–31)
MCHC RBC AUTO-ENTMCNC: 33.5 G/DL (ref 32–36)
MCV RBC AUTO: 90 FL (ref 82–98)
MONOCYTES # BLD AUTO: 0.8 K/UL (ref 0.3–1)
MONOCYTES NFR BLD: 9 % (ref 4–15)
NEUTROPHILS # BLD AUTO: 6.5 K/UL (ref 1.8–7.7)
NEUTROPHILS NFR BLD: 75.4 % (ref 38–73)
NITRITE UR QL STRIP: NEGATIVE
NRBC BLD-RTO: 0 /100 WBC
PH UR STRIP: 5 [PH] (ref 5–8)
PLATELET # BLD AUTO: 164 K/UL (ref 150–350)
PMV BLD AUTO: 11 FL (ref 9.2–12.9)
POTASSIUM SERPL-SCNC: 4.4 MMOL/L (ref 3.5–5.1)
PROT SERPL-MCNC: 7.8 G/DL (ref 6–8.4)
PROT UR QL STRIP: NEGATIVE
RBC # BLD AUTO: 4.82 M/UL (ref 4.6–6.2)
SODIUM SERPL-SCNC: 140 MMOL/L (ref 136–145)
SP GR UR STRIP: 1.02 (ref 1–1.03)
URN SPEC COLLECT METH UR: NORMAL
UROBILINOGEN UR STRIP-ACNC: NEGATIVE EU/DL
WBC # BLD AUTO: 8.67 K/UL (ref 3.9–12.7)

## 2019-11-25 PROCEDURE — 96374 THER/PROPH/DIAG INJ IV PUSH: CPT

## 2019-11-25 PROCEDURE — 85025 COMPLETE CBC W/AUTO DIFF WBC: CPT

## 2019-11-25 PROCEDURE — 83690 ASSAY OF LIPASE: CPT

## 2019-11-25 PROCEDURE — 80053 COMPREHEN METABOLIC PANEL: CPT

## 2019-11-25 PROCEDURE — 81003 URINALYSIS AUTO W/O SCOPE: CPT

## 2019-11-25 PROCEDURE — 99284 EMERGENCY DEPT VISIT MOD MDM: CPT | Mod: 25

## 2019-11-25 PROCEDURE — 63600175 PHARM REV CODE 636 W HCPCS: Performed by: PHYSICIAN ASSISTANT

## 2019-11-25 PROCEDURE — 96361 HYDRATE IV INFUSION ADD-ON: CPT

## 2019-11-25 PROCEDURE — 96375 TX/PRO/DX INJ NEW DRUG ADDON: CPT | Mod: 59

## 2019-11-25 RX ORDER — HYDROMORPHONE HYDROCHLORIDE 2 MG/ML
1 INJECTION, SOLUTION INTRAMUSCULAR; INTRAVENOUS; SUBCUTANEOUS
Status: COMPLETED | OUTPATIENT
Start: 2019-11-25 | End: 2019-11-25

## 2019-11-25 RX ORDER — DOCUSATE SODIUM 100 MG/1
100 CAPSULE, LIQUID FILLED ORAL 2 TIMES DAILY
Qty: 30 CAPSULE | Refills: 0 | Status: SHIPPED | OUTPATIENT
Start: 2019-11-25 | End: 2021-12-22

## 2019-11-25 RX ORDER — OXYCODONE AND ACETAMINOPHEN 5; 325 MG/1; MG/1
1 TABLET ORAL EVERY 4 HOURS PRN
Qty: 10 TABLET | Refills: 0 | Status: SHIPPED | OUTPATIENT
Start: 2019-11-25 | End: 2020-12-24

## 2019-11-25 RX ORDER — TAMSULOSIN HYDROCHLORIDE 0.4 MG/1
0.4 CAPSULE ORAL DAILY
Qty: 10 CAPSULE | Refills: 0 | Status: SHIPPED | OUTPATIENT
Start: 2019-11-25 | End: 2020-12-24

## 2019-11-25 RX ORDER — ONDANSETRON 2 MG/ML
4 INJECTION INTRAMUSCULAR; INTRAVENOUS
Status: COMPLETED | OUTPATIENT
Start: 2019-11-25 | End: 2019-11-25

## 2019-11-25 RX ADMIN — SODIUM CHLORIDE 1000 ML: 0.9 INJECTION, SOLUTION INTRAVENOUS at 11:11

## 2019-11-25 RX ADMIN — HYDROMORPHONE HYDROCHLORIDE 1 MG: 2 INJECTION, SOLUTION INTRAMUSCULAR; INTRAVENOUS; SUBCUTANEOUS at 12:11

## 2019-11-25 RX ADMIN — ONDANSETRON HYDROCHLORIDE 4 MG: 2 SOLUTION INTRAMUSCULAR; INTRAVENOUS at 11:11

## 2019-11-25 NOTE — ED PROVIDER NOTES
"Encounter Date: 11/25/2019    SCRIBE #1 NOTE: I, Yesi Patterson, am scribing for, and in the presence of,  CARLIE Montoya. I have scribed the following portions of the note - Other sections scribed: HPI, ROS.       History     Chief Complaint   Patient presents with    Flank Pain     " I probably have a kidney stone on the left side. I am nauseated".      Chief Complaint: Flank Pain  History of Present Illness: History obtained from patient. This 69 y.o. male with a PMHx of CAD, Diabetes Mellitus, Hypertension, and Hyperlipidemia presents to the ED complaining of left flank pain that began yesterday. He reports a PMHx of 13 left-sided kidney stones and states "this feels like a kidney stone". He notes that his last kidney stone was about 2 or 3 years ago and adds that he had a ureteral stent placed at [facility]. He adds that Dilaudid has provided relief in the past. He states having associated nausea, left sided abdominal distention, and dribbling urination. He adds that his abdomen felt "rock hard" yesterday. He reports that his last bowel movement was this morning and it was normal. He notes a past surgical history of 6 coronary stent placements (Dr. Ortega at [facility]). He denies any past abdominal surgeries or liver problems. He notes allergies to Penicillins. Denies vomiting, diarrhea, chest pain, SOB, urinary frequency, and dysuria. No other associated symptoms.    The history is provided by the patient. No  was used.     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 Martinez    CATARACT EXTRACTION W/  INTRAOCULAR LENS IMPLANT Right 08/30/2016    Dr. Martinez    CORONARY STENT PLACEMENT      JOINT REPLACEMENT      both knees    SPINE SURGERY      discectomy     Family History   Problem Relation Age of Onset    Dementia Mother     Cataracts Mother     Heart disease Father     Alcohol abuse Brother     Drug abuse Daughter     No Known Problems Son     No Known Problems Daughter     No Known Problems Son     No Known Problems Sister     No Known Problems Maternal Aunt     No Known Problems Maternal Uncle     No Known Problems Paternal Aunt     No Known Problems Paternal Uncle     No Known Problems Maternal Grandmother     No Known Problems Maternal Grandfather     No Known Problems Paternal Grandmother     No Known Problems Paternal Grandfather     Amblyopia Neg Hx     Blindness Neg Hx     Cancer Neg Hx     Diabetes Neg Hx     Glaucoma Neg Hx     Hypertension Neg Hx     Macular degeneration Neg Hx     Retinal detachment Neg Hx     Strabismus Neg Hx     Stroke Neg Hx     Thyroid disease Neg Hx      Social History     Tobacco Use    Smoking status: Never Smoker    Smokeless tobacco: Never Used   Substance Use Topics    Alcohol use: No    Drug use: No     Review of Systems   Constitutional: Negative for fever.   HENT: Negative for sore throat.    Respiratory: Negative for shortness of breath.    Cardiovascular: Negative for chest pain.   Gastrointestinal: Positive for abdominal distention and nausea. Negative for diarrhea and vomiting.   Genitourinary: Positive for flank pain (left). Negative for dysuria and frequency.        (+) Dribbling urination   Musculoskeletal: Negative for back pain.   Skin: Negative for rash.   Neurological: Negative for weakness.    Hematological: Does not bruise/bleed easily.       Physical Exam     Initial Vitals [11/25/19 1029]   BP Pulse Resp Temp SpO2   (!) 144/91 64 (!) 22 97.8 °F (36.6 °C) 97 %      MAP       --         Physical Exam    Nursing note and vitals reviewed.  Constitutional: He appears well-developed and well-nourished. No distress.   HENT:   Head: Normocephalic and atraumatic.   Nose: Nose normal.   Mouth/Throat: Oropharynx is clear and moist.   Eyes: EOM are normal. Pupils are equal, round, and reactive to light.   Neck: Normal range of motion. Neck supple.   Cardiovascular: Normal rate, regular rhythm and normal heart sounds. Exam reveals no gallop and no friction rub.    No murmur heard.  Pulmonary/Chest: Effort normal and breath sounds normal. No respiratory distress. He has no wheezes. He has no rhonchi. He has no rales.   Abdominal: Soft. Bowel sounds are normal. He exhibits no mass. There is tenderness in the left lower quadrant. There is no rigidity, no rebound, no guarding, no CVA tenderness, no tenderness at McBurney's point and negative Boles's sign. A hernia is present. Hernia confirmed positive in the ventral area.   Musculoskeletal: Normal range of motion.   Neurological: He is alert and oriented to person, place, and time. He has normal strength. No cranial nerve deficit or sensory deficit.   Skin: Skin is warm and dry. Capillary refill takes less than 2 seconds.   Psychiatric: He has a normal mood and affect.         ED Course   Procedures  Labs Reviewed   CBC W/ AUTO DIFFERENTIAL - Abnormal; Notable for the following components:       Result Value    Lymph # 0.9 (*)     Gran% 75.4 (*)     Lymph% 10.6 (*)     All other components within normal limits   COMPREHENSIVE METABOLIC PANEL - Abnormal; Notable for the following components:    Creatinine 1.6 (*)     Total Bilirubin 1.2 (*)     eGFR if  50 (*)     eGFR if non  44 (*)     All other components within normal limits    URINALYSIS, REFLEX TO URINE CULTURE    Narrative:     Preferred Collection Type->Urine, Clean Catch   LIPASE          Imaging Results          CT Renal Stone Study ABD Pelvis WO (Final result)  Result time 11/25/19 13:13:43    Final result by Panda Zhao MD (11/25/19 13:13:43)                 Impression:      Bilateral solitary small nonobstructing nephroliths.    Nodular hepatic contour which could reflect associated cirrhosis.  Correlate clinically.    Splenomegaly.    Grossly unchanged numerous scattered prominent and also mildly enlarged mesenteric and retroperitoneal lymph nodes and also grossly unchanged nonspecific leon mesentery.    Mild diverticulosis coli without diverticulitis.    Prostatomegaly.    Few additional findings as above.      Electronically signed by: Panda Zhao MD  Date:    11/25/2019  Time:    13:13             Narrative:    EXAMINATION:  CT RENAL STONE STUDY ABD PELVIS WO    CLINICAL HISTORY:  Flank pain, stone disease suspected;    TECHNIQUE:  Low dose axial images, sagittal and coronal reformations were obtained from the lung bases to the pubic symphysis.  Contrast was not administered.    COMPARISON:  CT abdomen and pelvis 09/29/2016    FINDINGS:  Imaged lung bases show minimal dependent atelectasis.  Base of the heart is mildly enlarged without significant pericardial fluid noting coronary arterial calcifications.    Liver is similar in size with lobulated contour and hypertrophy of the caudate and left hepatic lobe which could reflect sequela of underlying cirrhosis.  No focal hepatic process definitively seen.    Noncontrast appearance of the gallbladder, pancreas, stomach, duodenum and bilateral adrenal glands are within normal limits.  Spleen is enlarged with scattered parenchymal small calcifications consistent with granulomas.  Small accessory splenule near the inferior splenic pole.  No biliary ductal dilatation.    Bilateral kidneys are stable in size, shape and  location.  There is bilateral mild nonspecific perinephric stranding.  3 mm calculus at the right renal lower pole and 3 mm calculus at the left renal lower pole.  No radiodense calculus seen within the ureters on either side or urinary bladder.  No hydronephrosis.  Prostate is mildly enlarged without focal process seen.  A few scattered punctate pelvic phleboliths noted.    Several prominent and also mildly enlarged lymph nodes at the david hepatis and gastrohepatic regions as well as scattered subcentimeter mesenteric and retroperitoneal lymph nodes, grossly similar to prior.  Unchanged mild haziness of the mesentery within the midline and left abdomen.    No ascites or free air.  Abdominal aorta is mildly atherosclerotic but tapers normally.    Small fat containing umbilical hernia and small fat containing right inguinal hernia.    Appendix and terminal ileum are within normal limits.  Several scattered diverticula of the descending and proximal sigmoid colon without focal diverticulitis.  No evidence of bowel obstruction or inflammation.  No pneumatosis or portal venous gas.    Osseous structures appear grossly similar to prior without acute or destructive process seen.                                 Medical Decision Making:   ED Management:  This is an evaluation a 69 y.o. male who presents to the ED complaining of left flank pain left lower quadrant abdominal pain.  Vital signs stable.  Afebrile.  Patient is nontoxic appearing and in no acute distress. Abdominal exam shows mild left lower quadrant abdominal tenderness. No CVA tenderness.    CBC shows no leukocytosis.  H&H stable. CMP shows no significant electrolyte abnormalities.  BUN and creatinine are stable. UA shows no signs of infection.  Lipase within normal limits. CT shows bilateral small nonobstructive nephroliths.  There is mild diverticulosis without diverticulitis.    Also considered but less likely:     AAA: location inconsistent, no bruits, no  history of HTN  Cholecystitis: location inconsistent, no relation with meals, negative nunns  SBO: normal BM and flatus. No vomiting  Appendicitis: location inconsistent, no fever, no rebound/guarding  Mesenteric ischemia: HPI inconsistent, does not coincide with meals, other dx more likely  Kidney stone: no radiation to back or cva tenderness, no dysuria, no hematuria  Pyelonephritis: no cva tenderness, no dysuria, no fever  Pancreatitis: no history of alcohol abuse, unlikely gallstone obstructing, location inconsistent  Diverticulitis: age and location not most common, no history of diverticulitis, no fever, no wbc  UTI: UA negative, no dysuria or increased frequency of urination    I will discharge patient home with Flomax, Colace and pain medication.  Will encourage follow-up with PCP and Urology.    Patient given return precautions and instructed to return to the emergency department for any new or worsening symptoms. Patient verbalized understanding and agreed with plan.             Scribe Attestation:   Scribe #1: I performed the above scribed service and the documentation accurately describes the services I performed. I attest to the accuracy of the note.                          Clinical Impression:       ICD-10-CM ICD-9-CM   1. Left flank pain R10.9 789.09   2. Nephrolithiasis N20.0 592.0   3. Diverticulosis K57.90 562.10            Scribe attestation: I, Lm Espinal , personally performed the services described in this documentation. All medical record entries made by the scribe were at my direction and in my presence. I have reviewed the chart and agree that the record reflects my personal performance and is accurate and complete.                 Lm Espinal PA-C  11/25/19 4926

## 2019-11-27 ENCOUNTER — OFFICE VISIT (OUTPATIENT)
Dept: FAMILY MEDICINE | Facility: CLINIC | Age: 69
End: 2019-11-27
Payer: MEDICARE

## 2019-11-27 VITALS
SYSTOLIC BLOOD PRESSURE: 110 MMHG | BODY MASS INDEX: 41.75 KG/M2 | TEMPERATURE: 98 F | DIASTOLIC BLOOD PRESSURE: 70 MMHG | HEIGHT: 73 IN | RESPIRATION RATE: 20 BRPM | WEIGHT: 315 LBS | HEART RATE: 73 BPM | OXYGEN SATURATION: 96 %

## 2019-11-27 DIAGNOSIS — Z23 NEED FOR PNEUMOCOCCAL VACCINATION: ICD-10-CM

## 2019-11-27 DIAGNOSIS — K57.30 DIVERTICULAR DISEASE OF LEFT COLON: Primary | ICD-10-CM

## 2019-11-27 PROCEDURE — 99999 PR PBB SHADOW E&M-EST. PATIENT-LVL III: CPT | Mod: PBBFAC,,, | Performed by: FAMILY MEDICINE

## 2019-11-27 PROCEDURE — 1159F PR MEDICATION LIST DOCUMENTED IN MEDICAL RECORD: ICD-10-PCS | Mod: ,,, | Performed by: FAMILY MEDICINE

## 2019-11-27 PROCEDURE — 99214 OFFICE O/P EST MOD 30 MIN: CPT | Mod: S$PBB,,, | Performed by: FAMILY MEDICINE

## 2019-11-27 PROCEDURE — G0009 ADMIN PNEUMOCOCCAL VACCINE: HCPCS | Mod: PBBFAC,PO

## 2019-11-27 PROCEDURE — 1125F PR PAIN SEVERITY QUANTIFIED, PAIN PRESENT: ICD-10-PCS | Mod: ,,, | Performed by: FAMILY MEDICINE

## 2019-11-27 PROCEDURE — 1125F AMNT PAIN NOTED PAIN PRSNT: CPT | Mod: ,,, | Performed by: FAMILY MEDICINE

## 2019-11-27 PROCEDURE — 99999 PR PBB SHADOW E&M-EST. PATIENT-LVL III: ICD-10-PCS | Mod: PBBFAC,,, | Performed by: FAMILY MEDICINE

## 2019-11-27 PROCEDURE — 1159F MED LIST DOCD IN RCRD: CPT | Mod: ,,, | Performed by: FAMILY MEDICINE

## 2019-11-27 PROCEDURE — 99214 PR OFFICE/OUTPT VISIT, EST, LEVL IV, 30-39 MIN: ICD-10-PCS | Mod: S$PBB,,, | Performed by: FAMILY MEDICINE

## 2019-11-27 PROCEDURE — 99213 OFFICE O/P EST LOW 20 MIN: CPT | Mod: PBBFAC,PO | Performed by: FAMILY MEDICINE

## 2019-11-27 RX ORDER — METRONIDAZOLE 500 MG/1
500 TABLET ORAL EVERY 12 HOURS
Qty: 20 TABLET | Refills: 0 | Status: SHIPPED | OUTPATIENT
Start: 2019-11-27 | End: 2019-12-07

## 2019-11-27 NOTE — PROGRESS NOTES
Health Maintenance Due   Topic     Pneumococcal Vaccine (65+ High/Highest Risk) (2 of 2 - PPSV23) Ok to get to day

## 2019-11-27 NOTE — PROGRESS NOTES
Patient given pneumovax 23 vaccine right deltoid, tolerated well, no complaints, no reaction noted

## 2019-12-03 ENCOUNTER — TELEPHONE (OUTPATIENT)
Dept: FAMILY MEDICINE | Facility: CLINIC | Age: 69
End: 2019-12-03

## 2019-12-03 DIAGNOSIS — K21.9 GASTROESOPHAGEAL REFLUX DISEASE WITHOUT ESOPHAGITIS: ICD-10-CM

## 2019-12-12 NOTE — TELEPHONE ENCOUNTER
Patient was prescribed Contour next test strips, Pharmacy is stating that one is not covered if you can please send in a replacement

## 2019-12-15 RX ORDER — INSULIN PUMP SYRINGE, 3 ML
EACH MISCELLANEOUS
Qty: 1 EACH | Refills: 0 | Status: SHIPPED | OUTPATIENT
Start: 2019-12-15 | End: 2023-12-05

## 2019-12-15 RX ORDER — LANCETS
EACH MISCELLANEOUS
Qty: 100 EACH | Refills: 0 | Status: SHIPPED | OUTPATIENT
Start: 2019-12-15

## 2019-12-16 RX ORDER — CLOPIDOGREL BISULFATE 75 MG/1
TABLET ORAL
Qty: 90 TABLET | Refills: 0 | Status: SHIPPED | OUTPATIENT
Start: 2019-12-16 | End: 2020-07-13 | Stop reason: SDUPTHER

## 2019-12-27 ENCOUNTER — TELEPHONE (OUTPATIENT)
Dept: FAMILY MEDICINE | Facility: CLINIC | Age: 69
End: 2019-12-27

## 2019-12-27 NOTE — TELEPHONE ENCOUNTER
----- Message from Fabián Deluca sent at 12/27/2019  9:18 AM CST -----  Contact: Self  Type: Patient Call Back    Who called: Self    What is the request in detail: blood sugar diagnostic Strp not approved by insurance provider due to a form needed for approval. The form has been faxed over already please sign and submit to Darshan    Can the clinic reply by [facility]? no    Would the patient rather a call back or a response via [facility]? Call     Best call back number: [phone]

## 2020-01-06 ENCOUNTER — TELEPHONE (OUTPATIENT)
Dept: FAMILY MEDICINE | Facility: CLINIC | Age: 70
End: 2020-01-06

## 2020-01-06 NOTE — TELEPHONE ENCOUNTER
----- Message from Sharda Isaacs sent at 1/6/2020  4:36 PM CST -----  Contact: pt  Name of Who is Calling: Wale Calvo      What is the request in detail: pt states that he can not get his test strips from the pharmacy and would like to speak with staff. Please contact to further discuss and advise.      Can the clinic reply by [facility]: N       What Number to Call Back if not in [facility]: [phone]

## 2020-01-06 NOTE — TELEPHONE ENCOUNTER
Pt. States that pharmacy states that his strips are not covered by his insurance. Has been out for almost 2 weeks. Message forwarded to PCP.

## 2020-01-08 DIAGNOSIS — I10 ESSENTIAL HYPERTENSION: ICD-10-CM

## 2020-01-08 DIAGNOSIS — N18.30 TYPE 2 DIABETES MELLITUS WITH STAGE 3 CHRONIC KIDNEY DISEASE, WITHOUT LONG-TERM CURRENT USE OF INSULIN: Primary | ICD-10-CM

## 2020-01-08 DIAGNOSIS — E11.22 TYPE 2 DIABETES MELLITUS WITH STAGE 3 CHRONIC KIDNEY DISEASE, WITHOUT LONG-TERM CURRENT USE OF INSULIN: Primary | ICD-10-CM

## 2020-01-08 RX ORDER — LISINOPRIL 5 MG/1
5 TABLET ORAL DAILY
Qty: 90 TABLET | Refills: 3 | Status: SHIPPED | OUTPATIENT
Start: 2020-01-08 | End: 2020-08-26 | Stop reason: SDUPTHER

## 2020-01-09 ENCOUNTER — TELEPHONE (OUTPATIENT)
Dept: FAMILY MEDICINE | Facility: CLINIC | Age: 70
End: 2020-01-09

## 2020-01-09 DIAGNOSIS — E11.9 TYPE 2 DIABETES MELLITUS WITHOUT COMPLICATION, WITHOUT LONG-TERM CURRENT USE OF INSULIN: ICD-10-CM

## 2020-01-09 NOTE — TELEPHONE ENCOUNTER
----- Message from Tanvi Delong sent at 1/9/2020  8:32 AM CST -----  Contact: Anitha @ [facility]  Type: Patient Call Back    Who called:Anitha    What is the request in detail:Uzma wrote for a 50 day supply of pt test strips but MEDICAID prefer to get billed for a 3 month. [facility] is calling to get a script or a verbal for 3 months    Can the clinic reply by [facility]?no    Would the patient rather a call back or a response via [facility]? call    Best call back number:

## 2020-01-09 NOTE — TELEPHONE ENCOUNTER
One hundred strips were sent originally, I sent in a new Rx for 200 strips.  Please inform patient 200 is the max

## 2020-02-21 DIAGNOSIS — E11.9 TYPE 2 DIABETES MELLITUS WITHOUT COMPLICATION: ICD-10-CM

## 2020-03-15 NOTE — PROGRESS NOTES
"Chief Complaint   Patient presents with    Diverticulitis     follow ED visit 11/25/19       Wale Calvo is a 69 y.o. male who presents per the Chief Complaint.  Pt is known to me and was last seen by me on 11/11/2019.  All known chronic medical issues have been documented.       HPI     ROS  Review of Systems   Constitutional: Negative.  Negative for activity change, appetite change, chills, diaphoresis, fatigue, fever and unexpected weight change.   HENT: Negative.  Negative for congestion, ear pain, hearing loss, nosebleeds, postnasal drip, rhinorrhea, sinus pressure, sneezing, sore throat and trouble swallowing.    Eyes: Negative for pain and visual disturbance.   Respiratory: Negative for cough, choking and shortness of breath.    Cardiovascular: Negative for chest pain and leg swelling.   Gastrointestinal: Positive for abdominal pain. Negative for abdominal distention, anal bleeding, blood in stool, constipation, diarrhea, nausea, rectal pain and vomiting.   Genitourinary: Negative for difficulty urinating, dysuria, frequency and urgency.   Musculoskeletal: Negative.  Negative for arthralgias, back pain, gait problem, joint swelling and myalgias.   Skin: Negative.    Allergic/Immunologic: Negative for environmental allergies and food allergies.   Neurological: Negative.  Negative for dizziness, seizures, syncope, weakness, light-headedness and headaches.   Psychiatric/Behavioral: Negative.  Negative for confusion, decreased concentration, dysphoric mood and sleep disturbance. The patient is not nervous/anxious.        Physical Exam  Vitals:    11/27/19 1401   BP: 110/70   Pulse: 73   Resp: 20   Temp: 98.3 °F (36.8 °C)    Body mass index is 46.65 kg/m².  Weight: (!) 160.4 kg (353 lb 9.9 oz)   Height: 6' 1" (185.4 cm)     Physical Exam   Constitutional: He is oriented to person, place, and time. He appears well-developed and well-nourished. He is active and cooperative.  Non-toxic appearance. He does " not have a sickly appearance. He does not appear ill. No distress.   HENT:   Head: Normocephalic and atraumatic.   Right Ear: Hearing and external ear normal. No decreased hearing is noted.   Left Ear: Hearing and external ear normal. No decreased hearing is noted.   Nose: Nose normal. No rhinorrhea or nasal deformity.   Mouth/Throat: Uvula is midline and oropharynx is clear and moist. He does not have dentures. Normal dentition.   Eyes: Pupils are equal, round, and reactive to light. Conjunctivae, EOM and lids are normal. Right eye exhibits no chemosis, no discharge and no exudate. No foreign body present in the right eye. Left eye exhibits no chemosis, no discharge and no exudate. No foreign body present in the left eye. No scleral icterus.   Neck: Normal range of motion and full passive range of motion without pain. Neck supple.   Cardiovascular: Normal rate, regular rhythm, S1 normal, S2 normal and normal heart sounds. Exam reveals no gallop and no friction rub.   No murmur heard.  Pulmonary/Chest: Effort normal and breath sounds normal. No accessory muscle usage. No respiratory distress. He has no decreased breath sounds. He has no wheezes. He has no rhonchi. He has no rales.   Abdominal: Soft. Normal appearance. He exhibits no distension. There is no hepatosplenomegaly. There is no tenderness. There is no rigidity, no rebound and no guarding.       Musculoskeletal: Normal range of motion.   Neurological: He is alert and oriented to person, place, and time. He has normal strength. No cranial nerve deficit or sensory deficit. He exhibits normal muscle tone. He displays no seizure activity. Coordination and gait normal.   Skin: Skin is warm, dry and intact. No rash noted. He is not diaphoretic.   Psychiatric: He has a normal mood and affect. His speech is normal and behavior is normal. Judgment and thought content normal. Cognition and memory are normal. He is attentive.       Assessment & Plan    Discussion of  plan of care including treatment options regarding health and wellness were reviewed and discussed with patient.  Any changes to medication or treatment plan, as well as any screening blood test, imaging, or referrals to specialist, are documented.  Follow up as indicated.     1. Diverticular disease of left colon  Symptoms and history support treatment.  Will start antibiotics and recommended liquid diet to alleviate symptoms.  - metroNIDAZOLE (FLAGYL) 500 MG tablet; Take 1 tablet (500 mg total) by mouth every 12 (twelve) hours. for 10 days  Dispense: 20 tablet; Refill: 0    2. Need for pneumococcal vaccination  Patient due for pneumonia vaccine; Patient agreed and vaccine was administered in clinic today without complications.   - Pneumococcal Polysaccharide Vaccine (23 Valent) (SQ/IM)       Follow up in about 3 months (around 2/27/2020), or if symptoms worsen or fail to improve.        ACTIVE MEDICAL ISSUES:  Documented in Problem List    PAST MEDICAL HISTORY  Documented    PAST SURGICAL HISTORY:  Documented    SOCIAL HISTORY:  Documented    FAMILY HISTORY:  Documented    ALLERGIES AND MEDICATIONS: updated and reviewed.  Documented    Health Maintenance       Date Due Completion Date    Shingles Vaccine (1 of 2) 09/06/2001 ---    Hemoglobin A1c 02/11/2020 11/11/2019    Override on 11/11/2014: Done (future)    Foot Exam 06/25/2020 6/25/2019    Colonoscopy 07/25/2020 7/25/2017    Eye Exam 09/19/2020 9/19/2019    Lipid Panel 11/11/2020 11/11/2019    High Dose Statin 11/27/2020 11/27/2019    Aspirin/Antiplatelet Therapy 11/27/2020 11/27/2019    TETANUS VACCINE 08/17/2027 8/17/2017         Disorganized

## 2020-07-13 DIAGNOSIS — K21.9 GASTROESOPHAGEAL REFLUX DISEASE WITHOUT ESOPHAGITIS: ICD-10-CM

## 2020-07-13 RX ORDER — CLOPIDOGREL BISULFATE 75 MG/1
TABLET ORAL
Qty: 90 TABLET | Refills: 0 | Status: SHIPPED | OUTPATIENT
Start: 2020-07-13 | End: 2020-10-12 | Stop reason: SDUPTHER

## 2020-07-13 NOTE — TELEPHONE ENCOUNTER
----- Message from Soledad Antony sent at 7/13/2020  8:45 AM CDT -----  Contact: WENDY FELDMAN [[id number]]      Can the clinic reply in [facility]: no      Please refill the medication(s) listed below. Please call the patient when the prescription(s) is ready for  at this phone number  [phone] (home)         Medication #1 clopidogrel (PLAVIX) 75 mg tablet      Preferred Pharmacy:   [id number]  [place] [address] [place] [address]  Phone: [phone] Fax: [phone]

## 2020-07-13 NOTE — TELEPHONE ENCOUNTER
----- Message from Jem Ortega sent at 7/13/2020  8:56 AM CDT -----  Regarding: Refill  Contact: Patient  Type: Patient Call Back    Who called: Patient    What is the request in detail: Refill-ranitidine (ZANTAC) 300 MG tablet    Can the clinic reply by [facility]? No    Would the patient rather a call back or a response via [facility]? Call    Best call back number: [phone] (home)     Additional Information:   [id number] - [place] [address] AT [address]  [place] [address]  Phone: [phone] Fax: [phone]

## 2020-07-16 ENCOUNTER — TELEPHONE (OUTPATIENT)
Dept: FAMILY MEDICINE | Facility: CLINIC | Age: 70
End: 2020-07-16

## 2020-07-16 DIAGNOSIS — K21.9 GASTROESOPHAGEAL REFLUX DISEASE WITHOUT ESOPHAGITIS: Primary | ICD-10-CM

## 2020-07-16 RX ORDER — FAMOTIDINE 40 MG/1
40 TABLET, FILM COATED ORAL 2 TIMES DAILY
Qty: 60 TABLET | Refills: 0 | Status: SHIPPED | OUTPATIENT
Start: 2020-07-16 | End: 2020-07-16

## 2020-07-16 NOTE — TELEPHONE ENCOUNTER
----- Message from Olga Dyson sent at 7/15/2020  2:26 PM CDT -----  Regarding: recalled medication  Type: Patient Call Back    Who called:self    What is the request in detail:ranitidine (ZANTAC) 300 MG tablet has been alternative needs to be sent to pharmacy    Can the clinic reply by [facility]?no    Would the patient rather a call back or a response via [facility]? callback    Best call back number:[phone]

## 2020-07-17 ENCOUNTER — TELEPHONE (OUTPATIENT)
Dept: FAMILY MEDICINE | Facility: CLINIC | Age: 70
End: 2020-07-17

## 2020-07-17 NOTE — TELEPHONE ENCOUNTER
Spoke to pharmacist and they do have the 10mg in stock; they will fill 30 day supply for 10mg tablet with directions to take 1/2 tablet daily; I informed pt of change

## 2020-07-17 NOTE — TELEPHONE ENCOUNTER
----- Message from Paige Roper, Patient Care Assistant sent at 7/17/2020  4:20 PM CDT -----  Name of Who is Calling: WENDY FELDMAN [[id number]]    What is the request in detail: Requesting a call back in regards of Rx is on back order lisinopril (PRINIVIL,ZESTRIL) 5 MG tablet. Please contact to further discuss and advise      Can the clinic reply by [facility]: No    What Number to Call Back [facility]:  [phone]

## 2020-07-20 ENCOUNTER — HOSPITAL ENCOUNTER (EMERGENCY)
Facility: HOSPITAL | Age: 70
Discharge: HOME | End: 2020-07-20
Attending: EMERGENCY MEDICINE
Payer: MEDICARE

## 2020-07-20 VITALS
WEIGHT: 315 LBS | OXYGEN SATURATION: 98 % | RESPIRATION RATE: 18 BRPM | HEIGHT: 72 IN | TEMPERATURE: 98 F | HEART RATE: 59 BPM | SYSTOLIC BLOOD PRESSURE: 105 MMHG | BODY MASS INDEX: 42.66 KG/M2 | DIASTOLIC BLOOD PRESSURE: 56 MMHG

## 2020-07-20 DIAGNOSIS — K57.90 DIVERTICULOSIS: Primary | ICD-10-CM

## 2020-07-20 DIAGNOSIS — R10.9 LEFT FLANK PAIN: ICD-10-CM

## 2020-07-20 LAB
ALBUMIN SERPL BCP-MCNC: 4.2 G/DL (ref 3.5–5.2)
ALP SERPL-CCNC: 94 U/L (ref 55–135)
ALT SERPL W/O P-5'-P-CCNC: 25 U/L (ref 10–44)
AMORPH CRY URNS QL MICRO: ABNORMAL
ANION GAP SERPL CALC-SCNC: 11 MMOL/L (ref 8–16)
AST SERPL-CCNC: 22 U/L (ref 10–40)
BACTERIA #/AREA URNS HPF: ABNORMAL /HPF
BASOPHILS # BLD AUTO: 0.12 K/UL (ref 0–0.2)
BASOPHILS NFR BLD: 1.4 % (ref 0–1.9)
BILIRUB SERPL-MCNC: 1 MG/DL (ref 0.1–1)
BILIRUB UR QL STRIP: NEGATIVE
BUN SERPL-MCNC: 26 MG/DL (ref 8–23)
CALCIUM SERPL-MCNC: 9.1 MG/DL (ref 8.7–10.5)
CHLORIDE SERPL-SCNC: 108 MMOL/L (ref 95–110)
CLARITY UR: CLEAR
CO2 SERPL-SCNC: 20 MMOL/L (ref 23–29)
COLOR UR: YELLOW
CREAT SERPL-MCNC: 1.8 MG/DL (ref 0.5–1.4)
DIFFERENTIAL METHOD: ABNORMAL
EOSINOPHIL # BLD AUTO: 0.3 K/UL (ref 0–0.5)
EOSINOPHIL NFR BLD: 3.7 % (ref 0–8)
ERYTHROCYTE [DISTWIDTH] IN BLOOD BY AUTOMATED COUNT: 13.4 % (ref 11.5–14.5)
EST. GFR  (AFRICAN AMERICAN): 44 ML/MIN/1.73 M^2
EST. GFR  (NON AFRICAN AMERICAN): 38 ML/MIN/1.73 M^2
GLUCOSE SERPL-MCNC: 118 MG/DL (ref 70–110)
GLUCOSE UR QL STRIP: NEGATIVE
HCT VFR BLD AUTO: 43 % (ref 40–54)
HGB BLD-MCNC: 14.4 G/DL (ref 14–18)
HGB UR QL STRIP: NEGATIVE
HYALINE CASTS #/AREA URNS LPF: 6 /LPF
IMM GRANULOCYTES # BLD AUTO: 0.06 K/UL (ref 0–0.04)
IMM GRANULOCYTES NFR BLD AUTO: 0.7 % (ref 0–0.5)
KETONES UR QL STRIP: NEGATIVE
LEUKOCYTE ESTERASE UR QL STRIP: NEGATIVE
LYMPHOCYTES # BLD AUTO: 1 K/UL (ref 1–4.8)
LYMPHOCYTES NFR BLD: 11.3 % (ref 18–48)
MCH RBC QN AUTO: 30.5 PG (ref 27–31)
MCHC RBC AUTO-ENTMCNC: 33.5 G/DL (ref 32–36)
MCV RBC AUTO: 91 FL (ref 82–98)
MICROSCOPIC COMMENT: ABNORMAL
MONOCYTES # BLD AUTO: 0.9 K/UL (ref 0.3–1)
MONOCYTES NFR BLD: 10.3 % (ref 4–15)
NEUTROPHILS # BLD AUTO: 6.2 K/UL (ref 1.8–7.7)
NEUTROPHILS NFR BLD: 72.6 % (ref 38–73)
NITRITE UR QL STRIP: NEGATIVE
NRBC BLD-RTO: 0 /100 WBC
PH UR STRIP: 5 [PH] (ref 5–8)
PLATELET # BLD AUTO: 179 K/UL (ref 150–350)
PMV BLD AUTO: 11 FL (ref 9.2–12.9)
POTASSIUM SERPL-SCNC: 4.7 MMOL/L (ref 3.5–5.1)
PROT SERPL-MCNC: 7.4 G/DL (ref 6–8.4)
PROT UR QL STRIP: NEGATIVE
RBC # BLD AUTO: 4.72 M/UL (ref 4.6–6.2)
RBC #/AREA URNS HPF: 0 /HPF (ref 0–4)
SODIUM SERPL-SCNC: 139 MMOL/L (ref 136–145)
SP GR UR STRIP: 1.02 (ref 1–1.03)
SQUAMOUS #/AREA URNS HPF: 4 /HPF
URN SPEC COLLECT METH UR: ABNORMAL
UROBILINOGEN UR STRIP-ACNC: ABNORMAL EU/DL
WBC # BLD AUTO: 8.55 K/UL (ref 3.9–12.7)
WBC #/AREA URNS HPF: 3 /HPF (ref 0–5)

## 2020-07-20 PROCEDURE — 63600175 PHARM REV CODE 636 W HCPCS: Performed by: NURSE PRACTITIONER

## 2020-07-20 PROCEDURE — 81000 URINALYSIS NONAUTO W/SCOPE: CPT

## 2020-07-20 PROCEDURE — 85025 COMPLETE CBC W/AUTO DIFF WBC: CPT

## 2020-07-20 PROCEDURE — 25000003 PHARM REV CODE 250: Performed by: NURSE PRACTITIONER

## 2020-07-20 PROCEDURE — 80053 COMPREHEN METABOLIC PANEL: CPT

## 2020-07-20 PROCEDURE — 96375 TX/PRO/DX INJ NEW DRUG ADDON: CPT

## 2020-07-20 PROCEDURE — 99284 EMERGENCY DEPT VISIT MOD MDM: CPT | Mod: 25

## 2020-07-20 PROCEDURE — 96372 THER/PROPH/DIAG INJ SC/IM: CPT

## 2020-07-20 PROCEDURE — 96374 THER/PROPH/DIAG INJ IV PUSH: CPT

## 2020-07-20 PROCEDURE — 96361 HYDRATE IV INFUSION ADD-ON: CPT

## 2020-07-20 RX ORDER — ORPHENADRINE CITRATE 30 MG/ML
30 INJECTION INTRAMUSCULAR; INTRAVENOUS
Status: COMPLETED | OUTPATIENT
Start: 2020-07-20 | End: 2020-07-20

## 2020-07-20 RX ORDER — HYDROMORPHONE HYDROCHLORIDE 2 MG/ML
1 INJECTION, SOLUTION INTRAMUSCULAR; INTRAVENOUS; SUBCUTANEOUS
Status: COMPLETED | OUTPATIENT
Start: 2020-07-20 | End: 2020-07-20

## 2020-07-20 RX ORDER — ONDANSETRON 2 MG/ML
4 INJECTION INTRAMUSCULAR; INTRAVENOUS
Status: COMPLETED | OUTPATIENT
Start: 2020-07-20 | End: 2020-07-20

## 2020-07-20 RX ORDER — KETOROLAC TROMETHAMINE 30 MG/ML
10 INJECTION, SOLUTION INTRAMUSCULAR; INTRAVENOUS
Status: COMPLETED | OUTPATIENT
Start: 2020-07-20 | End: 2020-07-20

## 2020-07-20 RX ORDER — SULINDAC 150 MG/1
150 TABLET ORAL 2 TIMES DAILY
Qty: 10 TABLET | Refills: 0 | Status: SHIPPED | OUTPATIENT
Start: 2020-07-20 | End: 2020-07-25

## 2020-07-20 RX ORDER — CYCLOBENZAPRINE HCL 10 MG
10 TABLET ORAL 3 TIMES DAILY PRN
Qty: 15 TABLET | Refills: 0 | Status: SHIPPED | OUTPATIENT
Start: 2020-07-20 | End: 2020-07-25

## 2020-07-20 RX ADMIN — HYDROMORPHONE HYDROCHLORIDE 1 MG: 2 INJECTION, SOLUTION INTRAMUSCULAR; INTRAVENOUS; SUBCUTANEOUS at 10:07

## 2020-07-20 RX ADMIN — ONDANSETRON HYDROCHLORIDE 4 MG: 2 SOLUTION INTRAMUSCULAR; INTRAVENOUS at 10:07

## 2020-07-20 RX ADMIN — SODIUM CHLORIDE 1000 ML: 0.9 INJECTION, SOLUTION INTRAVENOUS at 10:07

## 2020-07-20 RX ADMIN — ORPHENADRINE CITRATE 30 MG: 30 INJECTION INTRAMUSCULAR; INTRAVENOUS at 10:07

## 2020-07-20 RX ADMIN — KETOROLAC TROMETHAMINE 10 MG: 30 INJECTION, SOLUTION INTRAMUSCULAR at 10:07

## 2020-07-20 NOTE — ED TRIAGE NOTES
Pt presents to the ED via personal transportation reporting left sided flank pain that began this morning. Pt also reporting nausea currently. Also reporting he fell off a ladder 3 days ago and landed on his left side. Also reporting hx of kidney stones. Pt in NAD.

## 2020-07-20 NOTE — ED PROVIDER NOTES
Encounter Date: 7/20/2020       History     Chief Complaint   Patient presents with    Flank Pain     left side pain Hx of kidney stones.Fell of ladder x 3 days on left side     Chief complaint:  Left flank pain    History of present illness:  Patient is a 69-year-old male with a history of multiple bilateral nephrolithiasis who states that approximately 30 min after waking this morning he began experience an aching stabbing pain in the left flank and some nausea without vomiting.  He reports the severity of pain is 9/10 at current and feels like his previous renal stones.  He reports no dysuria, no hematuria, no frequency or urgency but states that his urine is darker than it previously was.  Patient also states that 2 days ago he was on a ladder when he slipped and fell onto his left side onto a refrigerator.  He states he had no pain since that time until this morning.  He denies having hit his head or any other complaints.    The history is provided by the patient. No  was used.        Review of patient's allergies indicates:   Allergen Reactions    Penicillins Anaphylaxis     Past Medical History:   Diagnosis Date    Coronary artery disease     Diabetes mellitus type II     Diabetes with neurologic complications     ED (erectile dysfunction)     GERD (gastroesophageal reflux disease)     Hyperlipidemia     Hypertension     Kidney stones     Nuclear sclerosis of both eyes 7/1/2016    Obesity     Renal manifestation of secondary diabetes mellitus      Past Surgical History:   Procedure Laterality Date    CATARACT EXTRACTION W/  INTRAOCULAR LENS IMPLANT Left 08/16/2016    Dr. Martinez    CATARACT EXTRACTION W/  INTRAOCULAR LENS IMPLANT Right 08/30/2016    Dr. Martinez    CORONARY STENT PLACEMENT      JOINT REPLACEMENT      both knees    SPINE SURGERY      discectomy     Family History   Problem Relation Age of Onset    Dementia Mother     Cataracts Mother     Heart  disease Father     Alcohol abuse Brother     Drug abuse Daughter     No Known Problems Son     No Known Problems Daughter     No Known Problems Son     No Known Problems Sister     No Known Problems Maternal Aunt     No Known Problems Maternal Uncle     No Known Problems Paternal Aunt     No Known Problems Paternal Uncle     No Known Problems Maternal Grandmother     No Known Problems Maternal Grandfather     No Known Problems Paternal Grandmother     No Known Problems Paternal Grandfather     Amblyopia Neg Hx     Blindness Neg Hx     Cancer Neg Hx     Diabetes Neg Hx     Glaucoma Neg Hx     Hypertension Neg Hx     Macular degeneration Neg Hx     Retinal detachment Neg Hx     Strabismus Neg Hx     Stroke Neg Hx     Thyroid disease Neg Hx      Social History     Tobacco Use    Smoking status: Never Smoker    Smokeless tobacco: Never Used   Substance Use Topics    Alcohol use: No    Drug use: No     Review of Systems   Constitutional: Negative for appetite change, chills, diaphoresis, fatigue and fever.   HENT: Positive for rhinorrhea. Negative for congestion, ear discharge, ear pain, postnasal drip, sinus pressure, sneezing, sore throat and voice change.    Eyes: Negative for discharge, itching and visual disturbance.   Respiratory: Negative for cough, shortness of breath and wheezing.    Cardiovascular: Negative for chest pain, palpitations and leg swelling.   Gastrointestinal: Positive for diarrhea and nausea. Negative for abdominal pain and vomiting.   Endocrine: Negative for polydipsia, polyphagia and polyuria.   Genitourinary: Positive for flank pain. Negative for difficulty urinating, discharge, dysuria, frequency, hematuria, penile pain, penile swelling and urgency.   Musculoskeletal: Negative for arthralgias and myalgias.   Skin: Negative for rash and wound.   Neurological: Negative for dizziness, seizures, syncope and weakness.   Hematological: Negative for adenopathy. Does not  bruise/bleed easily.   Psychiatric/Behavioral: Negative for agitation and self-injury. The patient is not nervous/anxious.        Physical Exam     Initial Vitals [07/20/20 1023]   BP Pulse Resp Temp SpO2   (!) 145/69 75 20 97.5 °F (36.4 °C) 99 %      MAP       --         Physical Exam    Nursing note and vitals reviewed.  Constitutional: He appears well-developed and well-nourished. He is not diaphoretic. No distress.   HENT:   Head: Normocephalic and atraumatic.   Right Ear: External ear normal.   Left Ear: External ear normal.   Nose: Nose normal.   Eyes: Pupils are equal, round, and reactive to light. Right eye exhibits no discharge. Left eye exhibits no discharge. No scleral icterus.   Neck: Normal range of motion.   Pulmonary/Chest: No respiratory distress.   Abdominal: Soft. Normal appearance and bowel sounds are normal. He exhibits no distension. There is no abdominal tenderness. There is CVA tenderness (Left-sided).   Musculoskeletal: Normal range of motion.   Neurological: He is alert and oriented to person, place, and time.   Skin: Skin is dry. Capillary refill takes less than 2 seconds.         ED Course   Procedures  Labs Reviewed   CBC W/ AUTO DIFFERENTIAL - Abnormal; Notable for the following components:       Result Value    Immature Granulocytes 0.7 (*)     Immature Grans (Abs) 0.06 (*)     Lymph% 11.3 (*)     All other components within normal limits   COMPREHENSIVE METABOLIC PANEL - Abnormal; Notable for the following components:    CO2 20 (*)     Glucose 118 (*)     BUN, Bld 26 (*)     Creatinine 1.8 (*)     eGFR if  44 (*)     eGFR if non  38 (*)     All other components within normal limits   URINALYSIS, REFLEX TO URINE CULTURE - Abnormal; Notable for the following components:    Urobilinogen, UA 2.0-3.0 (*)     All other components within normal limits    Narrative:     Specimen Source->Urine   URINALYSIS MICROSCOPIC - Abnormal; Notable for the following  components:    Hyaline Casts, UA 6 (*)     All other components within normal limits    Narrative:     Specimen Source->Urine          Imaging Results          CT Renal Stone Study ABD Pelvis WO (Final result)  Result time 07/20/20 12:58:12    Final result by Chris Vera MD (07/20/20 12:58:12)                 Impression:      No acute abnormality to explain patient's flank pain.    Bilateral nonobstructing renal stones.  Overall stone burden is similar to prior exam.  No evidence of hydronephrosis.    Findings suggesting underlying cirrhosis with splenomegaly.  Stable mildly enlarged mesenteric and periportal lymph nodes.    Diverticulosis without evidence of acute diverticulitis.      Electronically signed by: Chris Vera MD  Date:    07/20/2020  Time:    12:58             Narrative:    EXAMINATION:  CT RENAL STONE STUDY ABD PELVIS WO    CLINICAL HISTORY:  Flank pain, kidney stone suspected;    TECHNIQUE:  Low dose axial images, sagittal and coronal reformations were obtained from the lung bases to the pubic symphysis.  Contrast was not administered.    COMPARISON:  11/25/2019    FINDINGS:  There is coronary artery atherosclerosis.  Heart is not enlarged.  Visualized portions of the lung bases show no acute abnormalities.    Liver has a nodular contour and morphology suggestive of cirrhosis.  No focal liver lesions on noncontrast imaging.  Spleen is enlarged with calcifications likely granulomas.  There is periportal lymph node enlargement similar to prior exam.  There also stable prominent mesenteric lymph nodes.    Stomach, pancreas and adrenal glands demonstrate no significant abnormality.    There are bilateral renal stones.  Overall stone burden is similar to prior.  No stones within the expected courses of the ureters or urinary bladder.  Urinary bladder is nondistended.  Prostate shows no significant abnormalities.    Small and large bowel show no evidence of obstruction.  Aby Read  diverticuli without evidence of acute diverticulitis.  Appendix not definitely visualized.  No inflammatory changes in the right lower quadrant.    Aorta is normal in caliber with moderate atherosclerosis.  Bones show degenerative changes.  No acute bony abnormalities.                               US Retroperitoneal Complete (Final result)  Result time 07/20/20 11:59:33   Procedure changed from US Kidney     Final result by Felipe Powell MD (07/20/20 11:59:33)                 Impression:      1. Mild elevation of the resistive indices, suspicious for medical renal disease.  2. No definite signs for hydronephrosis or abnormal perinephric fluid collections.      Electronically signed by: Felipe Powell MD  Date:    07/20/2020  Time:    11:59             Narrative:    EXAMINATION:  US RETROPERITONEAL COMPLETE    CLINICAL HISTORY:  left flank pain hx of nephrolithiasis;    TECHNIQUE:  Ultrasound of the kidneys and urinary bladder was performed including color flow and Doppler evaluation of the kidneys.    COMPARISON:  None.    FINDINGS:  Right kidney: The right kidney measures approximately 12.2 cm with a resistive index of 0.72.  No hydronephrosis or renal masses.  No abnormal perinephric fluid collections are noted.    Left kidney: The left kidney measures approximately 12.6 cm in its craniocaudal dimension with a resistive index of 0.78.  There is no hydronephrosis or renal masses or renal calculi.    Urinary bladder not evaluated well since CT is decompressed.                                 Medical Decision Making:   Initial Assessment:   69-year-old male with previous history of nephrolithiasis reports a history of left flank pain upon waking this morning., he reports having had a fall 2 days ago onto his left side.  Differential Diagnosis:   Fracture, dislocation, sprain, strain, musculoskeletal injury, nephrolithiasis, hydronephrosis, UTI, STD.  ED Management:  I have ordered appropriate labs and imaging as well  as medications to palliate the patient's symptoms.                   ED Course as of Jul 20 1503   Mon Jul 20, 2020   1114 CBC: leukocyte count was normal, the H&H was normal. The platelet count was normal.        CBC auto differential(!) [VC]   1114 UA is negative for infection, no nitrites, leukocytes, blood, or protein present.     Urinalysis, Reflex to Urine Culture Urine, Clean Catch(!) [VC]   1147 Mild interval worsening of renal function (was previously 1.6 creatinine). Otherwise essentially normal cmp.   Comprehensive metabolic panel(!) [VC]   1204 1. Mild elevation of the resistive indices, suspicious for medical renal disease.  2. No definite signs for hydronephrosis or abnormal perinephric fluid collections.   US Retroperitoneal Complete [VC]   1311    No acute abnormality to explain patient's flank pain.     Bilateral nonobstructing renal stones.  Overall stone burden is similar to prior exam.  No evidence of hydronephrosis.     Findings suggesting underlying cirrhosis with splenomegaly.  Stable mildly enlarged mesenteric and periportal lymph nodes.     Diverticulosis without evidence of acute diverticulitis.      CT Renal Stone Study ABD Pelvis WO [VC]      ED Course User Index  [VC] Bryson Cohen, HIREN     I believe the most likely etiology for the patient's discomfort is musculoskeletal.  The patient is discharged home in good condition and states the medications given have decreased pain sufficiently.  He should return for any worsening or changes in condition.See above for analyses of radiology, labs, and events during pt's visit and direct actions taken. Symptomatic therapies and return precautions on AVS.   Medication choices were made after reviewing allergies, medications, history, available laboratories. See below for discharge prescriptions if any and disposition.             Clinical Impression:       ICD-10-CM ICD-9-CM   1. Diverticulosis  K57.90 562.10   2. Left flank pain  R10.9 789.09          Disposition:   Disposition: Discharged  Condition: Stable     ED Disposition Condition    Discharge Stable        ED Prescriptions     Medication Sig Dispense Start Date End Date Auth. Provider    sulindac (CLINORIL) 150 MG tablet Take 1 tablet (150 mg total) by mouth 2 (two) times daily. for 5 days 10 tablet 7/20/2020 7/25/2020 Bryson Cohen DNP    cyclobenzaprine (FLEXERIL) 10 MG tablet Take 1 tablet (10 mg total) by mouth 3 (three) times daily as needed for Muscle spasms. 15 tablet 7/20/2020 7/25/2020 Bryson Cohen DNP        Follow-up Information     Follow up With Specialties Details Why Contact Info    Azikiwe K. Lombard, MD Family Medicine Schedule an appointment as soon as possible for a visit   [address] [place] [id number] [phone]

## 2020-07-21 ENCOUNTER — OFFICE VISIT (OUTPATIENT)
Dept: FAMILY MEDICINE | Facility: CLINIC | Age: 70
End: 2020-07-21
Payer: MEDICARE

## 2020-07-21 VITALS
DIASTOLIC BLOOD PRESSURE: 86 MMHG | OXYGEN SATURATION: 96 % | HEART RATE: 74 BPM | WEIGHT: 315 LBS | TEMPERATURE: 98 F | HEIGHT: 72 IN | BODY MASS INDEX: 42.66 KG/M2 | RESPIRATION RATE: 16 BRPM | SYSTOLIC BLOOD PRESSURE: 120 MMHG

## 2020-07-21 DIAGNOSIS — N20.0 NEPHROLITHIASIS: ICD-10-CM

## 2020-07-21 DIAGNOSIS — S30.0XXA TRAUMATIC HEMATOMA OF LOWER BACK, INITIAL ENCOUNTER: ICD-10-CM

## 2020-07-21 DIAGNOSIS — K74.60 HEPATIC CIRRHOSIS, UNSPECIFIED HEPATIC CIRRHOSIS TYPE, UNSPECIFIED WHETHER ASCITES PRESENT: ICD-10-CM

## 2020-07-21 DIAGNOSIS — R42 VERTIGO: Primary | ICD-10-CM

## 2020-07-21 PROCEDURE — 99999 PR PBB SHADOW E&M-EST. PATIENT-LVL V: CPT | Mod: PBBFAC,,, | Performed by: INTERNAL MEDICINE

## 2020-07-21 PROCEDURE — 99214 PR OFFICE/OUTPT VISIT, EST, LEVL IV, 30-39 MIN: ICD-10-PCS | Mod: S$PBB,,, | Performed by: INTERNAL MEDICINE

## 2020-07-21 PROCEDURE — 99214 OFFICE O/P EST MOD 30 MIN: CPT | Mod: S$PBB,,, | Performed by: INTERNAL MEDICINE

## 2020-07-21 PROCEDURE — 99215 OFFICE O/P EST HI 40 MIN: CPT | Mod: PBBFAC,PO | Performed by: INTERNAL MEDICINE

## 2020-07-21 PROCEDURE — 99999 PR PBB SHADOW E&M-EST. PATIENT-LVL V: ICD-10-PCS | Mod: PBBFAC,,, | Performed by: INTERNAL MEDICINE

## 2020-07-21 RX ORDER — MECLIZINE HYDROCHLORIDE 25 MG/1
25 TABLET ORAL 3 TIMES DAILY PRN
Qty: 30 TABLET | Refills: 2 | OUTPATIENT
Start: 2020-07-21 | End: 2020-09-05

## 2020-07-21 NOTE — PROGRESS NOTES
Subjective:       Patient ID: Wale Calvo is a pleasant 69 y.o. White male patient    Chief Complaint: Dizziness      Patient is a new pt to me but established pt from Dr. Lombard. See his last notes and the list of problems below.    HPI    He comes today due to concerns of dizziness that started 2 days ago.  It is worse at nighttime.  He feels that the room is spinning around him, no specific direction.  He is concerned of an ear infection, mainly on the left side, even if he has no pain or hearing loss.  It seems that he had similar issue but long time ago.  No upper respiratory infection recently.  He has a large bruise on the R lower back after falling from a ladder 2 days ago, slipping and hitting the fridge with his left side.  He went to ED yesterday and had CT scan done for left flank pain that was stated to be muskuloskeletal but no mention of the hematoma that the pt noticed this AM only.  Of note he is on Plavix.      Patient Active Problem List   Diagnosis    Type 2 diabetes mellitus with stage 3 chronic kidney disease, without long-term current use of insulin    Essential hypertension    Coronary artery disease involving native coronary artery of native heart without angina pectoris    Hyperlipidemia, acquired    Morbid obesity with BMI of 45.0-49.9, adult    Refractive error    Nuclear sclerosis of both eyes    DM type 2 without retinopathy    Senile nuclear sclerosis    Nuclear sclerosis of right eye    Lumbar transverse process fracture    Obstructive sleep apnea syndrome    Vitreous detachment of left eye    PCO (posterior capsular opacification), bilateral    Pseudophakia    Aortic atherosclerosis    Diverticulitis of colon    Hemorrhoids    Seasonal allergies    Dry eye syndrome of both eyes          ACTIVE MEDICAL ISSUES:  Documented in Problem List     PAST MEDICAL HISTORY  Documented     PAST SURGICAL HISTORY:  Documented     SOCIAL HISTORY:  Documented     FAMILY  HISTORY:  Documented     ALLERGIES AND MEDICATIONS: updated and reviewed.  Documented    Review of Systems   Constitutional: Negative.    HENT: Negative.    Respiratory: Negative.    Cardiovascular: Negative.    Skin: Positive for color change (hematoma L lower back).   Neurological: Positive for dizziness. Negative for headaches.       Objective:      Physical Exam  Vitals signs and nursing note reviewed.   Constitutional:       Appearance: Normal appearance.   HENT:      Right Ear: Tympanic membrane and external ear normal.      Left Ear: Tympanic membrane and external ear normal.   Cardiovascular:      Rate and Rhythm: Normal rate and regular rhythm.      Pulses: Normal pulses.      Heart sounds: Normal heart sounds.   Pulmonary:      Effort: Pulmonary effort is normal.      Breath sounds: Normal breath sounds.   Abdominal:      General: Abdomen is flat. Bowel sounds are normal.      Palpations: Abdomen is soft.   Skin:     Comments: Large bruise on L lower back, starting to fade   Neurological:      General: No focal deficit present.      Mental Status: He is alert and oriented to person, place, and time.      Cranial Nerves: No cranial nerve deficit.      Sensory: No sensory deficit.      Motor: No weakness.      Coordination: Coordination normal.      Gait: Gait normal.      Deep Tendon Reflexes: Reflexes normal.   Psychiatric:         Mood and Affect: Mood normal.         Behavior: Behavior normal.         Thought Content: Thought content normal.         Judgment: Judgment normal.         Vitals:    07/21/20 1109   BP: 120/86   BP Location: Right arm   Patient Position: Sitting   BP Method: Large (Manual)   Pulse: 74   Resp: 16   Temp: 98.1 °F (36.7 °C)   TempSrc: Oral   SpO2: 96%   Weight: (!) 164.9 kg (363 lb 8.6 oz)   Height: 6' (1.829 m)     Body mass index is 49.3 kg/m².    RESULTS: Reviewed labs from last 12 months    Assessment:       1. Vertigo    2. Traumatic hematoma of lower back, initial encounter     3. Nephrolithiasis    4. Hepatic cirrhosis, unspecified hepatic cirrhosis type, unspecified whether ascites present    5. BMI 45.0-49.9, adult        Plan:   Wale was seen today for dizziness.    Diagnoses and all orders for this visit:    Vertigo  -     meclizine (ANTIVERT) 25 mg tablet; Take 1 tablet (25 mg total) by mouth 3 (three) times daily as needed.    No relevant element at physical exam.  Will order Antivert.  Patient advised to follow-up if any concern.    Traumatic hematoma of lower back, initial encounter    Patient on Plavix.  Seems calm, patient had CT done yesterday, did not show any relevant finding in soft tissues.    Nephrolithiasis    Not active right now.    Hepatic cirrhosis, unspecified hepatic cirrhosis type, unspecified whether ascites present    See result of last 2 CT.  Patient states that he does not drink alcohol.  He will follow-up with Dr. Lombard.    BMI 45.0-49.9, adult    Patient to follow-up with Dr. Lombard, needs to work on his lifestyle.    Follow up for already scheduled with Dr. Lombard.    This note was created by combination of typed  and M-Modal dictation.  Transcription errors may be present.  If there are any questions, please contact me.

## 2020-08-12 ENCOUNTER — TELEPHONE (OUTPATIENT)
Dept: FAMILY MEDICINE | Facility: CLINIC | Age: 70
End: 2020-08-12

## 2020-08-13 ENCOUNTER — LAB VISIT (OUTPATIENT)
Dept: LAB | Facility: HOSPITAL | Age: 70
End: 2020-08-13
Attending: FAMILY MEDICINE
Payer: MEDICARE

## 2020-08-13 DIAGNOSIS — E11.9 TYPE 2 DIABETES MELLITUS WITHOUT COMPLICATION: ICD-10-CM

## 2020-08-13 LAB
ESTIMATED AVG GLUCOSE: 126 MG/DL (ref 68–131)
HBA1C MFR BLD HPLC: 6 % (ref 4–5.6)

## 2020-08-13 PROCEDURE — 83036 HEMOGLOBIN GLYCOSYLATED A1C: CPT

## 2020-08-13 PROCEDURE — 36415 COLL VENOUS BLD VENIPUNCTURE: CPT | Mod: PO

## 2020-08-26 DIAGNOSIS — I10 ESSENTIAL HYPERTENSION: ICD-10-CM

## 2020-08-26 RX ORDER — LISINOPRIL 5 MG/1
5 TABLET ORAL DAILY
Qty: 90 TABLET | Refills: 3 | Status: SHIPPED | OUTPATIENT
Start: 2020-08-26 | End: 2020-12-24

## 2020-08-26 NOTE — TELEPHONE ENCOUNTER
----- Message from Kaycee Peng sent at 8/26/2020 10:51 AM CDT -----  Type: RX Refill Request    Who Called: Self     Have you contacted your pharmacy: yes     Refill or New Rx: refill     RX Name and Strength: lisinopril (PRINIVIL,ZESTRIL) 5 MG tablet    Is this a 30 day or 90 day RX: 90 day     Preferred Pharmacy with phone number: [facility]/pharmacy #[id number] - [place] [id number] [facility] [phone] (Phone)  [phone] (Fax)    Local or Mail Order: local     Would the patient rather a call back or a response via My [facility]? Call back     Best Call Back Number: [phone]

## 2020-09-05 ENCOUNTER — NURSE TRIAGE (OUTPATIENT)
Dept: ADMINISTRATIVE | Facility: CLINIC | Age: 70
End: 2020-09-05

## 2020-09-05 ENCOUNTER — HOSPITAL ENCOUNTER (EMERGENCY)
Facility: HOSPITAL | Age: 70
Discharge: HOME | End: 2020-09-05
Attending: EMERGENCY MEDICINE
Payer: MEDICARE

## 2020-09-05 VITALS
HEART RATE: 65 BPM | OXYGEN SATURATION: 96 % | TEMPERATURE: 99 F | WEIGHT: 315 LBS | RESPIRATION RATE: 17 BRPM | HEIGHT: 73 IN | DIASTOLIC BLOOD PRESSURE: 68 MMHG | BODY MASS INDEX: 41.75 KG/M2 | SYSTOLIC BLOOD PRESSURE: 139 MMHG

## 2020-09-05 DIAGNOSIS — R29.6 FREQUENT FALLS: ICD-10-CM

## 2020-09-05 DIAGNOSIS — R42 DIZZINESS: Primary | ICD-10-CM

## 2020-09-05 LAB
ALBUMIN SERPL BCP-MCNC: 4 G/DL (ref 3.5–5.2)
ALP SERPL-CCNC: 99 U/L (ref 55–135)
ALT SERPL W/O P-5'-P-CCNC: 21 U/L (ref 10–44)
ANION GAP SERPL CALC-SCNC: 12 MMOL/L (ref 8–16)
APTT BLDCRRT: 27.4 SEC (ref 21–32)
AST SERPL-CCNC: 19 U/L (ref 10–40)
BASOPHILS # BLD AUTO: 0.09 K/UL (ref 0–0.2)
BASOPHILS NFR BLD: 1.4 % (ref 0–1.9)
BILIRUB SERPL-MCNC: 0.7 MG/DL (ref 0.1–1)
BUN SERPL-MCNC: 22 MG/DL (ref 8–23)
CALCIUM SERPL-MCNC: 9.1 MG/DL (ref 8.7–10.5)
CHLORIDE SERPL-SCNC: 108 MMOL/L (ref 95–110)
CO2 SERPL-SCNC: 20 MMOL/L (ref 23–29)
CREAT SERPL-MCNC: 1.6 MG/DL (ref 0.5–1.4)
DIFFERENTIAL METHOD: ABNORMAL
EOSINOPHIL # BLD AUTO: 0.3 K/UL (ref 0–0.5)
EOSINOPHIL NFR BLD: 4.5 % (ref 0–8)
ERYTHROCYTE [DISTWIDTH] IN BLOOD BY AUTOMATED COUNT: 13.5 % (ref 11.5–14.5)
EST. GFR  (AFRICAN AMERICAN): 50 ML/MIN/1.73 M^2
EST. GFR  (NON AFRICAN AMERICAN): 44 ML/MIN/1.73 M^2
GLUCOSE SERPL-MCNC: 130 MG/DL (ref 70–110)
HCT VFR BLD AUTO: 39.7 % (ref 40–54)
HGB BLD-MCNC: 12.8 G/DL (ref 14–18)
IMM GRANULOCYTES # BLD AUTO: 0.03 K/UL (ref 0–0.04)
IMM GRANULOCYTES NFR BLD AUTO: 0.5 % (ref 0–0.5)
INR PPP: 1 (ref 0.8–1.2)
LYMPHOCYTES # BLD AUTO: 0.9 K/UL (ref 1–4.8)
LYMPHOCYTES NFR BLD: 13.5 % (ref 18–48)
MAGNESIUM SERPL-MCNC: 1.8 MG/DL (ref 1.6–2.6)
MCH RBC QN AUTO: 29.8 PG (ref 27–31)
MCHC RBC AUTO-ENTMCNC: 32.2 G/DL (ref 32–36)
MCV RBC AUTO: 93 FL (ref 82–98)
MONOCYTES # BLD AUTO: 0.6 K/UL (ref 0.3–1)
MONOCYTES NFR BLD: 10 % (ref 4–15)
NEUTROPHILS # BLD AUTO: 4.4 K/UL (ref 1.8–7.7)
NEUTROPHILS NFR BLD: 70.1 % (ref 38–73)
NRBC BLD-RTO: 0 /100 WBC
PLATELET # BLD AUTO: 135 K/UL (ref 150–350)
PMV BLD AUTO: 11 FL (ref 9.2–12.9)
POCT GLUCOSE: 140 MG/DL (ref 70–110)
POTASSIUM SERPL-SCNC: 4.4 MMOL/L (ref 3.5–5.1)
PROT SERPL-MCNC: 7.2 G/DL (ref 6–8.4)
PROTHROMBIN TIME: 11.4 SEC (ref 9–12.5)
RBC # BLD AUTO: 4.29 M/UL (ref 4.6–6.2)
SODIUM SERPL-SCNC: 140 MMOL/L (ref 136–145)
TROPONIN I SERPL DL<=0.01 NG/ML-MCNC: 0.01 NG/ML (ref 0–0.03)
WBC # BLD AUTO: 6.28 K/UL (ref 3.9–12.7)

## 2020-09-05 PROCEDURE — 93010 EKG 12-LEAD: ICD-10-PCS | Mod: ,,, | Performed by: INTERNAL MEDICINE

## 2020-09-05 PROCEDURE — 85730 THROMBOPLASTIN TIME PARTIAL: CPT

## 2020-09-05 PROCEDURE — 85610 PROTHROMBIN TIME: CPT

## 2020-09-05 PROCEDURE — 84484 ASSAY OF TROPONIN QUANT: CPT

## 2020-09-05 PROCEDURE — 93005 ELECTROCARDIOGRAM TRACING: CPT

## 2020-09-05 PROCEDURE — 83735 ASSAY OF MAGNESIUM: CPT

## 2020-09-05 PROCEDURE — 85025 COMPLETE CBC W/AUTO DIFF WBC: CPT

## 2020-09-05 PROCEDURE — 93010 ELECTROCARDIOGRAM REPORT: CPT | Mod: ,,, | Performed by: INTERNAL MEDICINE

## 2020-09-05 PROCEDURE — 99283 PR EMERGENCY DEPT VISIT,LEVEL III: ICD-10-PCS | Mod: ,,, | Performed by: PSYCHIATRY & NEUROLOGY

## 2020-09-05 PROCEDURE — 99283 EMERGENCY DEPT VISIT LOW MDM: CPT | Mod: ,,, | Performed by: PSYCHIATRY & NEUROLOGY

## 2020-09-05 PROCEDURE — 63600175 PHARM REV CODE 636 W HCPCS: Performed by: EMERGENCY MEDICINE

## 2020-09-05 PROCEDURE — 96374 THER/PROPH/DIAG INJ IV PUSH: CPT

## 2020-09-05 PROCEDURE — 80053 COMPREHEN METABOLIC PANEL: CPT

## 2020-09-05 PROCEDURE — 96375 TX/PRO/DX INJ NEW DRUG ADDON: CPT

## 2020-09-05 PROCEDURE — 99285 EMERGENCY DEPT VISIT HI MDM: CPT | Mod: 25

## 2020-09-05 RX ORDER — DEXAMETHASONE SODIUM PHOSPHATE 4 MG/ML
8 INJECTION, SOLUTION INTRA-ARTICULAR; INTRALESIONAL; INTRAMUSCULAR; INTRAVENOUS; SOFT TISSUE
Status: COMPLETED | OUTPATIENT
Start: 2020-09-05 | End: 2020-09-05

## 2020-09-05 RX ORDER — BUTALBITAL, ACETAMINOPHEN AND CAFFEINE 50; 325; 40 MG/1; MG/1; MG/1
1 TABLET ORAL EVERY 4 HOURS PRN
Qty: 20 TABLET | Refills: 0 | Status: SHIPPED | OUTPATIENT
Start: 2020-09-05 | End: 2020-10-05

## 2020-09-05 RX ORDER — MECLIZINE HYDROCHLORIDE 25 MG/1
25 TABLET ORAL EVERY 6 HOURS PRN
Qty: 20 TABLET | Refills: 0 | Status: SHIPPED | OUTPATIENT
Start: 2020-09-05 | End: 2020-12-24

## 2020-09-05 RX ORDER — PROCHLORPERAZINE MALEATE 10 MG
10 TABLET ORAL EVERY 6 HOURS PRN
Qty: 15 TABLET | Refills: 0 | Status: SHIPPED | OUTPATIENT
Start: 2020-09-05 | End: 2020-12-24

## 2020-09-05 RX ORDER — DIAZEPAM 10 MG/2ML
2 INJECTION INTRAMUSCULAR
Status: COMPLETED | OUTPATIENT
Start: 2020-09-05 | End: 2020-09-05

## 2020-09-05 RX ORDER — ERYTHROMYCIN 5 MG/G
OINTMENT OPHTHALMIC
Qty: 1 TUBE | Refills: 1 | Status: SHIPPED | OUTPATIENT
Start: 2020-09-05 | End: 2020-12-24

## 2020-09-05 RX ADMIN — DEXAMETHASONE SODIUM PHOSPHATE 8 MG: 4 INJECTION, SOLUTION INTRAMUSCULAR; INTRAVENOUS at 04:09

## 2020-09-05 RX ADMIN — DIAZEPAM 2 MG: 5 INJECTION, SOLUTION INTRAMUSCULAR; INTRAVENOUS at 04:09

## 2020-09-05 NOTE — CONSULTS
"[facility]  Neurology  Consult Note    Patient Name: Wale Calvo  MRN: [id number]  Admission Date: 9/5/2020  Hospital Length of Stay: 0 days  Code Status: Prior   Attending Provider: Garcia Childs MD   Consulting Provider: Trae Neri MD  Primary Care Physician: Azikiwe K Lombard, MD  Principal Problem:<principal problem not specified>    Consults  Subjective:     Chief Complaint: Dizziness     HPI: 68 y/o male with medical Hx as listed below comes to ED for a two-day Hx of dizziness. Pt tells me that at times it feels like the room is spinning but others feels like lightheadedness and he is about to pass out. Seems that moving his head or changing positions trigger symptoms. Denies visual or speech disturbances, hearing loss, tinnitus, involvement of limbs. He does reports falling several times because of his symptoms. Mr. Calvo has has previou episode of dizziness in 2017 and July/2019. He was told on both occasions that he had an ear infection and given a "ZPak and a Medrol Pack.    Past Medical History:   Diagnosis Date    Coronary artery disease     Diabetes mellitus type II     Diabetes with neurologic complications     ED (erectile dysfunction)     GERD (gastroesophageal reflux disease)     Hyperlipidemia     Hypertension     Kidney stones     Nuclear sclerosis of both eyes 7/1/2016    Obesity     Renal manifestation of secondary diabetes mellitus        Past Surgical History:   Procedure Laterality Date    CATARACT EXTRACTION W/  INTRAOCULAR LENS IMPLANT Left 08/16/2016    Dr. Martinez    CATARACT EXTRACTION W/  INTRAOCULAR LENS IMPLANT Right 08/30/2016    Dr. Martinez    CORONARY STENT PLACEMENT      JOINT REPLACEMENT      both knees    SPINE SURGERY      discectomy       Review of patient's allergies indicates:   Allergen Reactions    Penicillins Anaphylaxis       Current Neurological Medications:     No current facility-administered medications on file " prior to encounter.      Current Outpatient Medications on File Prior to Encounter   Medication Sig    atorvastatin (LIPITOR) 40 MG tablet Take 1 tablet (40 mg total) by mouth once daily.    blood sugar diagnostic (CONTOUR NEXT TEST STRIPS) Strp TEST TWICE DAILY    blood sugar diagnostic Strp To check BG 2 times daily, to use with insurance preferred meter    blood-glucose meter kit To check BG 2 times daily, to use with insurance preferred meter    clopidogreL (PLAVIX) 75 mg tablet TAKE 1 TABLET(75 MG) BY MOUTH EVERY DAY    famotidine (PEPCID) 40 MG tablet TAKE 1 TABLET(40 MG) BY MOUTH TWICE DAILY    glimepiride (AMARYL) 4 MG tablet TAKE 1 TABLET BY MOUTH TWICE DAILY BEFORE A MEAL    lancets Misc To check BG 2 times daily, to use with insurance preferred meter    lisinopriL (PRINIVIL,ZESTRIL) 5 MG tablet Take 1 tablet (5 mg total) by mouth once daily.    meclizine (ANTIVERT) 25 mg tablet Take 1 tablet (25 mg total) by mouth 3 (three) times daily as needed.    metFORMIN (GLUCOPHAGE) 1000 MG tablet Take 1 tablet (1,000 mg total) by mouth 2 (two) times daily with meals.    docusate sodium (COLACE) 100 MG capsule Take 1 capsule (100 mg total) by mouth 2 (two) times daily.    lancets (SAFETY LANCETS) 21 gauge Misc 1 lancet by Misc.(Non-Drug; Combo Route) route 2 (two) times daily.    mupirocin (BACTROBAN) 2 % ointment Apply topically 2 (two) times daily.    oxyCODONE-acetaminophen (PERCOCET) 5-325 mg per tablet Take 1 tablet by mouth every 4 (four) hours as needed for Pain.    tamsulosin (FLOMAX) 0.4 mg Cap Take 1 capsule (0.4 mg total) by mouth once daily. for 10 days (Patient not taking: Reported on 7/21/2020)      Family History     Problem Relation (Age of Onset)    Alcohol abuse Brother    Cataracts Mother    Dementia Mother    Drug abuse Daughter    Heart disease Father    No Known Problems Son, Daughter, Son, Sister, Maternal Aunt, Maternal Uncle, Paternal Aunt, Paternal Uncle, Maternal  Grandmother, Maternal Grandfather, Paternal Grandmother, Paternal Grandfather        Tobacco Use    Smoking status: Never Smoker    Smokeless tobacco: Never Used   Substance and Sexual Activity    Alcohol use: No    Drug use: No    Sexual activity: Not on file     Review of Systems   Constitutional: Negative for fever.   HENT: Negative for trouble swallowing.    Eyes: Negative for photophobia.   Respiratory: Negative for shortness of breath.    Cardiovascular: Negative for chest pain.   Gastrointestinal: Negative for abdominal pain.   Genitourinary: Negative for dysuria.   Musculoskeletal: Negative for back pain.   Neurological: Positive for dizziness. Negative for headaches.   Psychiatric/Behavioral: Negative for confusion.     Objective:     Vital Signs (Most Recent):  Temp: 97.7 °F (36.5 °C) (09/05/20 1106)  Pulse: (S) 62 (09/05/20 1254)  Resp: 17 (09/05/20 1254)  BP: (S) (!) 143/68 (09/05/20 1254)  SpO2: 98 % (09/05/20 1254) Vital Signs (24h Range):  Temp:  [97.7 °F (36.5 °C)] 97.7 °F (36.5 °C)  Pulse:  [61-74] 62  Resp:  [14-20] 17  SpO2:  [96 %-99 %] 98 %  BP: (132-151)/(62-70) 143/68     Weight: (!) 161 kg (355 lb)  Body mass index is 46.84 kg/m².    Physical Exam  Constitutional:       General: He is not in acute distress.     Appearance: He is not ill-appearing.   HENT:      Head: Normocephalic.      Right Ear: External ear normal.      Left Ear: External ear normal.   Eyes:      General:         Right eye: No discharge.         Left eye: No discharge.   Cardiovascular:      Rate and Rhythm: Normal rate.   Pulmonary:      Breath sounds: Normal breath sounds.   Abdominal:      Palpations: Abdomen is soft.   Musculoskeletal:         General: No tenderness.   Neurological:      Mental Status: He is oriented to person, place, and time.      Coordination: Finger-Nose-Finger Test and Heel to Shin Test normal.      Deep Tendon Reflexes: Strength normal.   Psychiatric:         Speech: Speech normal.          NEUROLOGICAL EXAMINATION:     MENTAL STATUS   Oriented to person, place, and time.   Speech: speech is normal   Level of consciousness: alert    CRANIAL NERVES     CN III, IV, VI   Right pupil: Size: 3 mm. Shape: regular.   Left pupil: Size: 3 mm. Shape: regular.   Nystagmus: none   Conjugate gaze: present    CN V   Right facial sensation deficit: none  Left facial sensation deficit: none    CN VII   Right facial weakness: none  Left facial weakness: none    CN VIII   Hearing: intact    CN IX, X   Palate: symmetric    CN XI   Right trapezius strength: normal  Left trapezius strength: normal    CN XII   Tongue deviation: none    MOTOR EXAM     Strength   Strength 5/5 throughout.     GAIT AND COORDINATION      Coordination   Finger to nose coordination: normal  Heel to shin coordination: normal    Symptoms were reproduced when pt sat up from supine position. No nystagmus seens    Significant Labs:   CBC:   Recent Labs   Lab 09/05/20  1244   WBC 6.28   HGB 12.8*   HCT 39.7*   *     CMP:   Recent Labs   Lab 09/05/20  1244   *      K 4.4      CO2 20*   BUN 22   CREATININE 1.6*   CALCIUM 9.1   MG 1.8   PROT 7.2   ALBUMIN 4.0   BILITOT 0.7   ALKPHOS 99   AST 19   ALT 21   ANIONGAP 12   EGFRNONAA 44*       Significant Imaging: I have reviewed all pertinent imaging results/findings within the past 24 hours.   Head CT  FINDINGS:  The cortical sulcal pattern demonstrates age related atrophy.  No hydrocephalus, midline shift or abnormal mass effect present. No intra-or extra-axial mass or hemorrhage. No CT evidence of large territory acute stroke.     Orbits are unremarkable. Paranasal sinuses are clear. Mastoid air cells are clear. Calvarium is intact.     Impression:     No acute intracranial abnormality.        Electronically signed by: Kingsley Hudson MD  Date:                                            09/05/2020  Time:                                           14:18    Assessment and  Plan:     70 y/o male consulted for dizziness    1. Dizziness: symptoms os positional vertigo but also of postural hypotension.   On exam today there a no focal findgins that may indicate any specific etiology. No nystagmus but able to reproduce symptoms by changing body position.   Head CT shows no acute abnormalities.   -Dexamethasone 8 mg IV x 1 and diazepam 2 mg IV x 1 trial.   -He may be D/C home if symptoms resolve.    VTE Risk Mitigation (From admission, onward)    None          Thank you for your consult. I will follow-up with patient. Please contact us if you have any additional questions.    Trae Neri MD  Neurology  [facility] [place]

## 2020-09-05 NOTE — ED TRIAGE NOTES
Pt arrived to the ED due to dizziness and 2 falls (on a soft surface) since Wednesday; pt also reports a right drift when he ambulates and one fall today against his dresser. Pt also reports a pressurized h/a that started Thursday. Pt called an on-call nurse and was recommended to visit the ED. Pt has been taking 2 aspirin since Wednesday for the dizziness and h/a

## 2020-09-05 NOTE — DISCHARGE INSTRUCTIONS
Fioricet and Compazine for headache.   Please take Benadryl 50 mg when you take Compazine.  Meclizine for dizziness.  Hold on to something when you 1st get up to walk.  Return immediately if you get worse or if new problems develop.  Please follow-up with the neurologist above.  You may also follow up with your primary care doctor.

## 2020-09-05 NOTE — ED PROVIDER NOTES
Encounter Date: 9/5/2020       History     Chief Complaint   Patient presents with    Dizziness     felling down  x 4 in 2 days     Headache     startedx 2 days     HPI   This 69-year-old male presents to the emergency room complaining of episodic dizziness.  Patient reports 4 falls in the last 2 days.  The patient has a severe frontal headache.  He does report getting weak, before the falls and reports that he always fall to the right side.  He denies ear pain ring in the ears.  There is no history of any vertigo.  There is no history of syncope.  The 1st episode occurred on Wednesday after being outside cleaning the pool the day before with very little liquid intake.  He was seen in an outpatient clinic and was told that he had orthostatic hypotension.  Patient has a long history of coronary artery disease.  He also has diabetes hypertension hyperlipidemia and kidney stones.  He denies other neurologic deficits.  He is essentially otherwise well.  There is no occipital pain or neck pain.  The headache is all frontal.  Review of patient's allergies indicates:   Allergen Reactions    Penicillins Anaphylaxis     Past Medical History:   Diagnosis Date    Coronary artery disease     Diabetes mellitus type II     Diabetes with neurologic complications     ED (erectile dysfunction)     GERD (gastroesophageal reflux disease)     Hyperlipidemia     Hypertension     Kidney stones     Nuclear sclerosis of both eyes 7/1/2016    Obesity     Renal manifestation of secondary diabetes mellitus      Past Surgical History:   Procedure Laterality Date    CATARACT EXTRACTION W/  INTRAOCULAR LENS IMPLANT Left 08/16/2016    Dr. Martinez    CATARACT EXTRACTION W/  INTRAOCULAR LENS IMPLANT Right 08/30/2016    Dr. Martinez    CORONARY STENT PLACEMENT      JOINT REPLACEMENT      both knees    SPINE SURGERY      discectomy     Family History   Problem Relation Age of Onset    Dementia Mother     Cataracts Mother      Heart disease Father     Alcohol abuse Brother     Drug abuse Daughter     No Known Problems Son     No Known Problems Daughter     No Known Problems Son     No Known Problems Sister     No Known Problems Maternal Aunt     No Known Problems Maternal Uncle     No Known Problems Paternal Aunt     No Known Problems Paternal Uncle     No Known Problems Maternal Grandmother     No Known Problems Maternal Grandfather     No Known Problems Paternal Grandmother     No Known Problems Paternal Grandfather     Amblyopia Neg Hx     Blindness Neg Hx     Cancer Neg Hx     Diabetes Neg Hx     Glaucoma Neg Hx     Hypertension Neg Hx     Macular degeneration Neg Hx     Retinal detachment Neg Hx     Strabismus Neg Hx     Stroke Neg Hx     Thyroid disease Neg Hx      Social History     Tobacco Use    Smoking status: Never Smoker    Smokeless tobacco: Never Used   Substance Use Topics    Alcohol use: No    Drug use: No     Review of Systems  The patient was questioned specifically with regard to the following.  General: Fever, chills, sweats. Neuro: Headache. Eyes: eye problems. ENT: Ear pain, sore throat. Cardiovascular: Chest pain. Respiratory: Cough, shortness of breath. Gastrointestinal: Abdominal pain, vomiting, diarrhea. Genitourinary: Painful urination.  Musculoskeletal: Arm and leg problems. Skin: Rash.  The review of systems was negative except for the following:  Headache in front of the head, episodic dizziness with falling, the patient had 1 episode of vomiting yesterday.  He denies other neurologic deficits.  Physical Exam     Initial Vitals [09/05/20 1106]   BP Pulse Resp Temp SpO2   (!) 151/70 74 20 97.7 °F (36.5 °C) 96 %      MAP       --         Physical Exam  The patient was examined specifically for the following:   General:No significant distress, Good color, Warm and dry. Head and neck:Scalp atraumatic, Neck supple. Neurological:Appropriate conversation, Gross motor deficits.  Eyes:Conjugate gaze, Clear corneas. ENT: No epistaxis. Cardiac: Regular rate and rhythm, Grossly normal heart tones. Pulmonary: Wheezing, Rales. Gastrointestinal: Abdominal tenderness, Abdominal distention. Musculoskeletal: Extremity deformity, Apparent pain with range of motion of the joints. Skin: Rash.   The findings on examination were normal except for the following:  The patient is morbidly obese.  The blood pressure is 151/70.  The heart rate of 74.  Finger-to-nose and heel to shin are normal.  The patient reports instability.  Cranial nerves motor and sensory examination appeared to be grossly unremarkable.  Patient had a sudden loss of equilibrium during the physical exam.  Patient has injected conjunctiva bilaterally.  There is some pus the canthi.  Corneas are clear.  Anterior chambers are clear.  ED Course   Procedures  Labs Reviewed   COMPREHENSIVE METABOLIC PANEL - Abnormal; Notable for the following components:       Result Value    CO2 20 (*)     Glucose 130 (*)     Creatinine 1.6 (*)     eGFR if  50 (*)     eGFR if non  44 (*)     All other components within normal limits   CBC W/ AUTO DIFFERENTIAL - Abnormal; Notable for the following components:    RBC 4.29 (*)     Hemoglobin 12.8 (*)     Hematocrit 39.7 (*)     Platelets 135 (*)     Lymph # 0.9 (*)     Lymph% 13.5 (*)     All other components within normal limits   POCT GLUCOSE - Abnormal; Notable for the following components:    POCT Glucose 140 (*)     All other components within normal limits   APTT   PROTIME-INR   TROPONIN I   MAGNESIUM     EKG Readings: (Independently Interpreted)   This patient is in a sinus bradycardia with a heart rate of 58.  There are no significant ST segment or T-wave changes.  There is poor R-wave progression across precordium.  There is no definite evidence of acute myocardial infarction or malignant arrhythmia.       Imaging Results          CT Head Without Contrast (Final result)   Result time 09/05/20 14:18:38    Final result by Kingsley Hudson MD (09/05/20 14:18:38)                 Impression:      No acute intracranial abnormality.      Electronically signed by: Kingsley Hudson MD  Date:    09/05/2020  Time:    14:18             Narrative:    EXAMINATION:  CT HEAD WITHOUT CONTRAST    CLINICAL HISTORY:  Dizziness, non-specific; Dizziness and giddiness    TECHNIQUE:  Low dose axial images were obtained through the head.  Coronal and sagittal reformations were also performed. Contrast was not administered.    COMPARISON:  None.    FINDINGS:  The cortical sulcal pattern demonstrates age related atrophy.  No hydrocephalus, midline shift or abnormal mass effect present. No intra-or extra-axial mass or hemorrhage. No CT evidence of large territory acute stroke.    Orbits are unremarkable. Paranasal sinuses are clear. Mastoid air cells are clear. Calvarium is intact.                              Medical decision making:  Given the above, this patient presents to the emergency room with dizziness.  The patient has had 4 falls in the last 2 days.  The dizziness is episodic.  Usually occurs when he sits up.  He does not constant phenomenon.  The patient has no other neurologic deficits.  CT of the head was unremarkable.  Consultation was obtained with , who recommended treatment with IV diazepam and Decadron in the emergency room.  The patient's dizziness improved.  I will discharge on meclizine and Fioricet for head pain.  I will have the patient follow up with Dr. Gates.  Hypotension and metabolic abnormalities producing this syndrome.  Wondering whether this is a peripheral vertigo syndrome.  I will treat the conjunctivitis with erythromycin.                                     Clinical Impression:       ICD-10-CM ICD-9-CM   1. Dizziness  R42 780.4   2. Frequent falls  R29.6 V15.88             ED Disposition Condition    Discharge Stable        ED Prescriptions     Medication Sig  Dispense Start Date End Date Auth. Provider    meclizine (ANTIVERT) 25 mg tablet Take 1 tablet (25 mg total) by mouth every 6 (six) hours as needed. 20 tablet 9/5/2020  Garcia Childs MD    butalbital-acetaminophen-caffeine -40 mg (FIORICET, ESGIC) -40 mg per tablet Take 1 tablet by mouth every 4 (four) hours as needed for Pain. 20 tablet 9/5/2020 10/5/2020 Garcia Childs MD    prochlorperazine (COMPAZINE) 10 MG tablet Take 1 tablet (10 mg total) by mouth every 6 (six) hours as needed (Dizziness.  Take with Benadryl 50 mg). 15 tablet 9/5/2020  Garcia Childs MD        Follow-up Information     Follow up With Specialties Details Why Contact Info    Lang Pizarro MD Neurology In 3 days  [address] [facility] [address]  [address] [place] [address]  [phone]                                       Garcia Childs MD  09/05/20 1907       Garcia Childs MD  09/05/20 9900

## 2020-09-05 NOTE — ED NOTES
Patient reports he has had some green drainage from bilateral eyes x 1 week. Also reports history of ear infections.

## 2020-09-05 NOTE — TELEPHONE ENCOUNTER
Dizzy and falling down. Was at [facility] went to urgent care there and was told he was dehydrated. Not gotten any better he says after drinking fluids. Started Thursday. Patient states he has an unusual headache that has been present. He states dizziness only goes away after taking two aspirin. hes walking okay right now but at times he feels like he is walking falling to either side. Recommended he come to ED now for evaluation and have another adult to drive. He agrees to plan.    Reason for Disposition   [1] MODERATE dizziness (e.g., interferes with normal activities) AND [2] has NOT been evaluated by physician for this  (EXCEPTION: dizziness caused by heat exposure, sudden standing, or poor fluid intake)   [1] MODERATE headache (e.g., interferes with normal activities) AND [2] present > 24 hours AND [3] unexplained  (Exceptions: analgesics not tried, typical migraine, or headache part of viral illness)    Additional Information   Negative: Severe difficulty breathing (e.g., struggling for each breath, speaks in single words)   Negative: [1] Difficulty breathing or swallowing AND [2] started suddenly after medicine, an allergic food or bee sting   Negative: Shock suspected (e.g., cold/pale/clammy skin, too weak to stand)   Negative: Difficult to awaken or acting confused  (e.g., disoriented, slurred speech)   Negative: [1] Weakness (i.e., paralysis, loss of muscle strength) of the face, arm or leg on one side of the body AND [2] sudden onset AND [3] present now   Negative: [1] Numbness (i.e., loss of sensation) of the face, arm or leg on one side of the body AND [2] sudden onset AND [3] present now   Negative: [1] Loss of speech or garbled speech AND [2] sudden onset AND [3] present now   Negative: Overdose (accidental or intentional) of medications   Negative: [1] Fainted > 15 minutes ago AND [2] still feels too weak or dizzy to stand   Negative: Heart beating < 50 beats per minute OR > 140 beats  "per minute   Negative: Sounds like a life-threatening emergency to the triager   Negative: Difficulty breathing   Negative: SEVERE dizziness (e.g., unable to stand, requires support to walk, feels like passing out now)   Negative: Extra heart beats OR irregular heart beating  (i.e., "palpitations")   Negative: [1] Drinking very little AND [2] dehydration suspected (e.g., no urine > 12 hours, very dry mouth, very lightheaded)   Negative: Patient sounds very sick or weak to the triager   Negative: [1] Dizziness caused by heat exposure, sudden standing, or poor fluid intake AND [2] no improvement after 2 hours of rest and fluids   Negative: [1] Fever > 103 F (39.4 C) AND [2] not able to get the fever down using Fever Care Advice   Negative: [1] Fever > 101 F (38.3 C) AND [2] age > 60   Negative: [1] Fever > 101 F (38.3 C) AND [2] bedridden (e.g.,  nursing home patient, CVA, chronic illness, recovering from surgery)   Negative: [1] Fever > 100.5 F (38.1 C) AND [2] diabetes mellitus or immunocompromised (e.g., HIV positive, cancer chemo, splenectomy, organ transplant, chronic steroids)   Negative: Difficult to awaken or acting confused (e.g., disoriented, slurred speech)   Negative: [1] Weakness of the face, arm or leg on one side of the body AND [2] new onset   Negative: [1] Numbness of the face, arm or leg on one side of the body AND [2] new onset   Negative: [1] Loss of speech or garbled speech AND [2] new onset   Negative: Passed out (i.e., lost consciousness, collapsed and was not responding)   Negative: Sounds like a life-threatening emergency to the triager   Negative: Unable to walk, or can only walk with assistance (e.g., requires support)   Negative: Stiff neck (can't touch chin to chest)   Negative: Severe pain in one eye   Negative: [1] Other family members (or roommates) with headaches AND [2] possibility of carbon monoxide exposure   Negative: [1] SEVERE headache (e.g., excruciating) " "AND [2] "worst headache" of life   Negative: [1] SEVERE headache AND [2] sudden-onset (i.e., reaching maximum intensity within seconds)   Negative: [1] SEVERE headache AND [2] fever   Negative: Loss of vision or double vision (Exception: same as prior migraines)   Negative: [1] Fever > 100.0 F (37.8 C) AND [2] diabetes mellitus or weak immune system (e.g., HIV positive, cancer chemo, splenectomy, organ transplant, chronic steroids)   Negative: Patient sounds very sick or weak to the triager   Negative: [1] SEVERE headache (e.g., excruciating) AND [2] not improved after 2 hours of pain medicine   Negative: [1] Vomiting AND [2] 2 or more times (Exception: similar to previous migraines)   Negative: Fever > 104 F (40 C)    Protocols used: ST DIZZINESS-A-AH, HEADACHE-A-AH      "

## 2020-09-06 NOTE — ED NOTES
Report received from FRED Melendez; pt resting calmly in bed awaiting discharge; no distress noted;

## 2020-09-06 NOTE — ED NOTES
Pt AAOx4; pt reports that he is feeling better than when first arrived; no distress noted; pt discharged home with wife as ride; pt ambulates with steady gait, no assist needed; pt speech clear and coherent

## 2020-09-08 RX ORDER — AZITHROMYCIN 250 MG/1
TABLET, FILM COATED ORAL
Qty: 6 TABLET | Refills: 0 | Status: SHIPPED | OUTPATIENT
Start: 2020-09-08 | End: 2020-12-24

## 2020-10-01 ENCOUNTER — PATIENT MESSAGE (OUTPATIENT)
Dept: OTHER | Facility: OTHER | Age: 70
End: 2020-10-01

## 2020-10-12 DIAGNOSIS — E78.00 PURE HYPERCHOLESTEROLEMIA: Primary | ICD-10-CM

## 2020-10-12 RX ORDER — CLOPIDOGREL BISULFATE 75 MG/1
TABLET ORAL
Qty: 90 TABLET | Refills: 3 | Status: SHIPPED | OUTPATIENT
Start: 2020-10-12 | End: 2020-10-12 | Stop reason: SDUPTHER

## 2020-10-12 RX ORDER — CLOPIDOGREL BISULFATE 75 MG/1
TABLET ORAL
Qty: 90 TABLET | Refills: 3 | Status: SHIPPED | OUTPATIENT
Start: 2020-10-12 | End: 2020-12-24 | Stop reason: ALTCHOICE

## 2020-10-12 NOTE — TELEPHONE ENCOUNTER
----- Message from Jeffrey Vazquez sent at 10/12/2020 11:57 AM CDT -----  Regarding: Refill  Contact: WENDY FELDMAN [[id number]]      Can the clinic reply in [facility]: yes      Please refill the medication(s) listed below. Please call the patient when the prescription(s) is ready for  at this phone number   [phone]      Medication #1 clopidogreL (PLAVIX) 75 mg tablet    Preferred Pharmacy: [id number]  [place] [address] AT [facility]

## 2020-11-13 DIAGNOSIS — E11.9 TYPE 2 DIABETES MELLITUS WITHOUT COMPLICATION: ICD-10-CM

## 2020-11-23 ENCOUNTER — TELEPHONE (OUTPATIENT)
Dept: OPHTHALMOLOGY | Facility: CLINIC | Age: 70
End: 2020-11-23

## 2020-11-25 ENCOUNTER — OFFICE VISIT (OUTPATIENT)
Dept: OPHTHALMOLOGY | Facility: CLINIC | Age: 70
End: 2020-11-25
Payer: MEDICARE

## 2020-11-25 DIAGNOSIS — H04.123 DRY EYE SYNDROME OF BOTH EYES: ICD-10-CM

## 2020-11-25 DIAGNOSIS — H26.493 PCO (POSTERIOR CAPSULAR OPACIFICATION), BILATERAL: Primary | ICD-10-CM

## 2020-11-25 DIAGNOSIS — E11.9 DM TYPE 2 WITHOUT RETINOPATHY: ICD-10-CM

## 2020-11-25 DIAGNOSIS — H43.812 VITREOUS DETACHMENT OF LEFT EYE: ICD-10-CM

## 2020-11-25 DIAGNOSIS — Z96.1 PSEUDOPHAKIA: ICD-10-CM

## 2020-11-25 DIAGNOSIS — I10 ESSENTIAL HYPERTENSION: ICD-10-CM

## 2020-11-25 PROCEDURE — 99213 OFFICE O/P EST LOW 20 MIN: CPT | Mod: PBBFAC,PO | Performed by: OPHTHALMOLOGY

## 2020-11-25 PROCEDURE — 66821 AFTER CATARACT LASER SURGERY: CPT | Mod: PBBFAC,PO | Performed by: OPHTHALMOLOGY

## 2020-11-25 PROCEDURE — 99999 PR PBB SHADOW E&M-EST. PATIENT-LVL III: CPT | Mod: PBBFAC,,, | Performed by: OPHTHALMOLOGY

## 2020-11-25 PROCEDURE — 99999 PR PBB SHADOW E&M-EST. PATIENT-LVL III: ICD-10-PCS | Mod: PBBFAC,,, | Performed by: OPHTHALMOLOGY

## 2020-11-25 PROCEDURE — 66821 AFTER CATARACT LASER SURGERY: CPT | Mod: S$PBB,LT,, | Performed by: OPHTHALMOLOGY

## 2020-11-25 PROCEDURE — 92014 COMPRE OPH EXAM EST PT 1/>: CPT | Mod: 57,S$PBB,, | Performed by: OPHTHALMOLOGY

## 2020-11-25 PROCEDURE — 66821 PR DISCISSION,2ND CATARACT,LASER: ICD-10-PCS | Mod: S$PBB,LT,, | Performed by: OPHTHALMOLOGY

## 2020-11-25 PROCEDURE — 92014 PR EYE EXAM, EST PATIENT,COMPREHESV: ICD-10-PCS | Mod: 57,S$PBB,, | Performed by: OPHTHALMOLOGY

## 2020-11-25 RX ORDER — KETOCONAZOLE 20 MG/G
CREAM TOPICAL
COMMUNITY
Start: 2020-09-24 | End: 2021-10-08

## 2020-11-25 RX ORDER — HYDROCORTISONE 25 MG/G
CREAM TOPICAL
COMMUNITY
Start: 2020-09-24 | End: 2022-12-22

## 2020-11-26 NOTE — PROGRESS NOTES
Subjective:       Patient ID: Wale Calvo is a 70 y.o. male.    Chief Complaint: PCO OU, Dry Eye, PC IOL OU, and Diabetic Eye Exam    HPI     DAMASO 069/2019  Diabetic BS  Patient had close one eye a couple of weeks   ago and noticed OS was cloudy.  Patient also states his eyes feel dry and   get crusty in the inner corner.  Patient doesn't use AT's on a regular   basis.   Wears OTC +1.50 for near.       Hemoglobin A1C       Date                     Value               Ref Range             Status                08/13/2020               6.0 (H)             4.0 - 5.6 %           Final        11/11/2019               5.9 (H)             4.0 - 5.6 %           Final         06/25/2019               6.1 (H)             4.0 - 5.6 %           Final                  Last edited by Gisella Stone on 11/25/2020  4:08 PM. (History)             Assessment:       1. PCO (posterior capsular opacification), bilateral    2. Dry eye syndrome of both eyes    3. Vitreous detachment of left eye    4. DM type 2 without retinopathy    5. Essential hypertension    6. Pseudophakia        Plan:       Visually significant  PCO OS- Pt. Wants Laser.  Early PCO OD- Not Visually Significant.  MAXIMILIAN-Doing well.  PVD OS-Stable.  DM-No NPDR OU.  HTN-No retinopathy OU.      YAG CAP left eye (OS) today. TE=   103 mj, Avg. 0.7 mj, 148 pulses.     PF taper OS.  Control DM & HTN.  RTC 2-3 wks.    YAG laser Capsulotomy Procedure Note:  Informed consent was obtained and correct eye was verified with patient.   One drop of topical Proparacaine 1% was instilled.  YAG laser applied to posterior capsule in cruciate pattern.  One drop of Apraclonidine 0.5% and 1 drop of Pred Acetate 1% applied to eye after laser.  Pt tolerated procedure well. No complications.  Follow up in 2-3 weeks.

## 2020-12-03 DIAGNOSIS — E78.00 PURE HYPERCHOLESTEROLEMIA: ICD-10-CM

## 2020-12-04 RX ORDER — ATORVASTATIN CALCIUM 40 MG/1
40 TABLET, FILM COATED ORAL DAILY
Qty: 90 TABLET | Refills: 3 | Status: SHIPPED | OUTPATIENT
Start: 2020-12-04 | End: 2022-01-03 | Stop reason: SDUPTHER

## 2020-12-11 ENCOUNTER — PATIENT MESSAGE (OUTPATIENT)
Dept: OTHER | Facility: OTHER | Age: 70
End: 2020-12-11

## 2020-12-14 DIAGNOSIS — K21.9 GASTROESOPHAGEAL REFLUX DISEASE WITHOUT ESOPHAGITIS: ICD-10-CM

## 2020-12-14 RX ORDER — FAMOTIDINE 40 MG/1
40 TABLET, FILM COATED ORAL 2 TIMES DAILY
Qty: 180 TABLET | Refills: 0 | Status: SHIPPED | OUTPATIENT
Start: 2020-12-14 | End: 2021-03-31 | Stop reason: SDUPTHER

## 2020-12-14 NOTE — TELEPHONE ENCOUNTER
----- Message from Gretel Ortega sent at 12/14/2020  1:51 PM CST -----  Type: RX Refill Request    Who Called:  Self     Have you contacted your pharmacy: no     Refill or New Rx: Refill     RX Name and Strength:famotidine (PEPCID) 40 MG tablet      Preferred Pharmacy with phone number:    [facility], [place] [address]   [phone]    Local or Mail Order: Local     Ordering Provider: Uzma SEXTON    Would the patient rather a call back or a response via [facility]?  Call     Best Call Back Number:.[phone] (home)

## 2020-12-15 DIAGNOSIS — E11.22 TYPE 2 DIABETES MELLITUS WITH STAGE 3 CHRONIC KIDNEY DISEASE, WITHOUT LONG-TERM CURRENT USE OF INSULIN: ICD-10-CM

## 2020-12-15 DIAGNOSIS — N18.30 TYPE 2 DIABETES MELLITUS WITH STAGE 3 CHRONIC KIDNEY DISEASE, WITHOUT LONG-TERM CURRENT USE OF INSULIN: ICD-10-CM

## 2020-12-15 NOTE — TELEPHONE ENCOUNTER
----- Message from Siri Timmons sent at 12/15/2020  8:45 AM CST -----  Type: RX Refill Request    Who Called: self    Have you contacted your pharmacy: no    Refill or New Rx:refill    RX Name and Strength: glimepiride (AMARYL) 4 MG tablet      Preferred Pharmacy with phone number: [facility] #[id number] - [place], [place] - [address] AT [facility] [address] [phone] (Phone)  [phone] (Fax)      Would the patient rather a call back or a response via My [facility]? call    Best Call Back Number:.[phone] (home)

## 2020-12-16 RX ORDER — GLIMEPIRIDE 4 MG/1
TABLET ORAL
Qty: 180 TABLET | Refills: 1 | Status: SHIPPED | OUTPATIENT
Start: 2020-12-16 | End: 2021-09-30 | Stop reason: SDUPTHER

## 2020-12-24 ENCOUNTER — OFFICE VISIT (OUTPATIENT)
Dept: OPHTHALMOLOGY | Facility: CLINIC | Age: 70
End: 2020-12-24
Payer: MEDICARE

## 2020-12-24 DIAGNOSIS — Z96.1 PSEUDOPHAKIA: ICD-10-CM

## 2020-12-24 DIAGNOSIS — B96.89 BACTERIAL CONJUNCTIVITIS OF BOTH EYES: ICD-10-CM

## 2020-12-24 DIAGNOSIS — Z98.890 POST-OPERATIVE STATE: Primary | ICD-10-CM

## 2020-12-24 DIAGNOSIS — H10.9 BACTERIAL CONJUNCTIVITIS OF BOTH EYES: ICD-10-CM

## 2020-12-24 DIAGNOSIS — H26.491 PCO (POSTERIOR CAPSULAR OPACIFICATION), RIGHT: ICD-10-CM

## 2020-12-24 PROCEDURE — 99024 POSTOP FOLLOW-UP VISIT: CPT | Mod: POP,,, | Performed by: OPHTHALMOLOGY

## 2020-12-24 PROCEDURE — 99999 PR PBB SHADOW E&M-EST. PATIENT-LVL III: CPT | Mod: PBBFAC,,, | Performed by: OPHTHALMOLOGY

## 2020-12-24 PROCEDURE — 99024 PR POST-OP FOLLOW-UP VISIT: ICD-10-PCS | Mod: POP,,, | Performed by: OPHTHALMOLOGY

## 2020-12-24 PROCEDURE — 99999 PR PBB SHADOW E&M-EST. PATIENT-LVL III: ICD-10-PCS | Mod: PBBFAC,,, | Performed by: OPHTHALMOLOGY

## 2020-12-24 PROCEDURE — 99213 OFFICE O/P EST LOW 20 MIN: CPT | Mod: PBBFAC,PO | Performed by: OPHTHALMOLOGY

## 2020-12-24 RX ORDER — ISOSORBIDE MONONITRATE 30 MG/1
30 TABLET, EXTENDED RELEASE ORAL DAILY
COMMUNITY
Start: 2020-12-22 | End: 2021-01-09 | Stop reason: SDUPTHER

## 2020-12-24 RX ORDER — APIXABAN 5 MG/1
5 TABLET, FILM COATED ORAL DAILY
COMMUNITY
Start: 2020-12-22 | End: 2022-12-22 | Stop reason: SDUPTHER

## 2020-12-24 RX ORDER — METOPROLOL TARTRATE 25 MG/1
25 TABLET, FILM COATED ORAL DAILY
COMMUNITY
Start: 2020-12-22 | End: 2021-01-09 | Stop reason: SDUPTHER

## 2020-12-24 NOTE — PROGRESS NOTES
Subjective:       Patient ID: Wale Calvo is a 70 y.o. male.    Chief Complaint: Follow-up (Pt states no complaints this morning and did okay after laser)    HPI     Follow-up      Additional comments: Pt states no complaints this morning and did okay   after laser              Comments     3 week follow up post Yag Cap OS.Pt c/o green discharge OU.    1. Early PCO OD  2. Yag Cap OS  3. MAXIMILIAN  4. PVD OS  5. DM no NPDR  6. HTN            Last edited by Chay Martinez MD on 12/24/2020  2:24 PM.   (History)             Assessment:       1. Post-operative state    2. PCO (posterior capsular opacification), right    3. Bacterial conjunctivitis of both eyes    4. Pseudophakia        Plan:       S/p YAG CAP OS- Doing well.  Early PCO OD- Not Visually Significant.  Bacterial conj OU-Needs abx gtts.      Start ofloxacin qid OU x 7 days.  RTC 6 mos.

## 2021-01-08 ENCOUNTER — HOSPITAL ENCOUNTER (OUTPATIENT)
Facility: HOSPITAL | Age: 71
Discharge: HOME | End: 2021-01-09
Attending: EMERGENCY MEDICINE | Admitting: HOSPITALIST
Payer: MEDICARE

## 2021-01-08 VITALS
SYSTOLIC BLOOD PRESSURE: 129 MMHG | OXYGEN SATURATION: 97 % | DIASTOLIC BLOOD PRESSURE: 78 MMHG | WEIGHT: 315 LBS | BODY MASS INDEX: 41.75 KG/M2 | RESPIRATION RATE: 18 BRPM | HEIGHT: 73 IN | HEART RATE: 80 BPM | TEMPERATURE: 98 F

## 2021-01-08 DIAGNOSIS — I24.9 ACS (ACUTE CORONARY SYNDROME): Primary | ICD-10-CM

## 2021-01-08 DIAGNOSIS — R07.9 CHEST PAIN: ICD-10-CM

## 2021-01-08 DIAGNOSIS — I20.0 UNSTABLE ANGINA: ICD-10-CM

## 2021-01-08 DIAGNOSIS — I48.0 PAROXYSMAL ATRIAL FIBRILLATION: ICD-10-CM

## 2021-01-08 PROBLEM — I48.91 A-FIB: Status: ACTIVE | Noted: 2021-01-08

## 2021-01-08 PROBLEM — N18.30 CKD (CHRONIC KIDNEY DISEASE) STAGE 3, GFR 30-59 ML/MIN: Status: ACTIVE | Noted: 2021-01-08

## 2021-01-08 LAB
ALBUMIN SERPL BCP-MCNC: 4 G/DL (ref 3.5–5.2)
ALP SERPL-CCNC: 88 U/L (ref 55–135)
ALT SERPL W/O P-5'-P-CCNC: 23 U/L (ref 10–44)
ANION GAP SERPL CALC-SCNC: 11 MMOL/L (ref 8–16)
ASCENDING AORTA: 3.56 CM
AST SERPL-CCNC: 21 U/L (ref 10–40)
AV INDEX (PROSTH): 0.69
AV MEAN GRADIENT: 8 MMHG
AV PEAK GRADIENT: 12 MMHG
AV VALVE AREA: 3.58 CM2
AV VELOCITY RATIO: 0.63
BASOPHILS # BLD AUTO: 0.1 K/UL (ref 0–0.2)
BASOPHILS NFR BLD: 1.5 % (ref 0–1.9)
BILIRUB SERPL-MCNC: 0.5 MG/DL (ref 0.1–1)
BNP SERPL-MCNC: 237 PG/ML (ref 0–99)
BSA FOR ECHO PROCEDURE: 2.92 M2
BUN SERPL-MCNC: 27 MG/DL (ref 8–23)
CALCIUM SERPL-MCNC: 9.7 MG/DL (ref 8.7–10.5)
CHLORIDE SERPL-SCNC: 109 MMOL/L (ref 95–110)
CO2 SERPL-SCNC: 22 MMOL/L (ref 23–29)
CREAT SERPL-MCNC: 1.8 MG/DL (ref 0.5–1.4)
CTP QC/QA: YES
CV ECHO LV RWT: 0.6 CM
DIFFERENTIAL METHOD: ABNORMAL
DOP CALC AO PEAK VEL: 1.76 M/S
DOP CALC AO VTI: 35.54 CM
DOP CALC LVOT AREA: 5.2 CM2
DOP CALC LVOT DIAMETER: 2.58 CM
DOP CALC LVOT PEAK VEL: 1.11 M/S
DOP CALC LVOT STROKE VOLUME: 127.34 CM3
DOP CALCLVOT PEAK VEL VTI: 24.37 CM
ECHO LV POSTERIOR WALL: 1.62 CM (ref 0.6–1.1)
EOSINOPHIL # BLD AUTO: 0.3 K/UL (ref 0–0.5)
EOSINOPHIL NFR BLD: 3.7 % (ref 0–8)
ERYTHROCYTE [DISTWIDTH] IN BLOOD BY AUTOMATED COUNT: 13 % (ref 11.5–14.5)
EST. GFR  (AFRICAN AMERICAN): 43 ML/MIN/1.73 M^2
EST. GFR  (NON AFRICAN AMERICAN): 38 ML/MIN/1.73 M^2
FRACTIONAL SHORTENING: 44 % (ref 28–44)
GLUCOSE SERPL-MCNC: 82 MG/DL (ref 70–110)
HCT VFR BLD AUTO: 40.8 % (ref 40–54)
HGB BLD-MCNC: 13.6 G/DL (ref 14–18)
IMM GRANULOCYTES # BLD AUTO: 0.05 K/UL (ref 0–0.04)
IMM GRANULOCYTES NFR BLD AUTO: 0.7 % (ref 0–0.5)
INTERVENTRICULAR SEPTUM: 1.32 CM (ref 0.6–1.1)
IVRT: 83.04 MSEC
LA MAJOR: 7.01 CM
LEFT ATRIUM SIZE: 3.54 CM
LEFT INTERNAL DIMENSION IN SYSTOLE: 2.99 CM (ref 2.1–4)
LEFT VENTRICLE DIASTOLIC VOLUME INDEX: 50.31 ML/M2
LEFT VENTRICLE DIASTOLIC VOLUME: 139.79 ML
LEFT VENTRICLE MASS INDEX: 126 G/M2
LEFT VENTRICLE SYSTOLIC VOLUME INDEX: 12.5 ML/M2
LEFT VENTRICLE SYSTOLIC VOLUME: 34.73 ML
LEFT VENTRICULAR INTERNAL DIMENSION IN DIASTOLE: 5.37 CM (ref 3.5–6)
LEFT VENTRICULAR MASS: 349.21 G
LYMPHOCYTES # BLD AUTO: 0.9 K/UL (ref 1–4.8)
LYMPHOCYTES NFR BLD: 13 % (ref 18–48)
MCH RBC QN AUTO: 30.2 PG (ref 27–31)
MCHC RBC AUTO-ENTMCNC: 33.3 G/DL (ref 32–36)
MCV RBC AUTO: 91 FL (ref 82–98)
MONOCYTES # BLD AUTO: 0.8 K/UL (ref 0.3–1)
MONOCYTES NFR BLD: 11.8 % (ref 4–15)
NEUTROPHILS # BLD AUTO: 4.7 K/UL (ref 1.8–7.7)
NEUTROPHILS NFR BLD: 69.3 % (ref 38–73)
NRBC BLD-RTO: 0 /100 WBC
PISA TR MAX VEL: 1.45 M/S
PLATELET # BLD AUTO: 152 K/UL (ref 150–350)
PMV BLD AUTO: 11.1 FL (ref 9.2–12.9)
POCT GLUCOSE: 59 MG/DL (ref 70–110)
POCT GLUCOSE: 92 MG/DL (ref 70–110)
POTASSIUM SERPL-SCNC: 4.6 MMOL/L (ref 3.5–5.1)
PROT SERPL-MCNC: 7.2 G/DL (ref 6–8.4)
PV PEAK VELOCITY: 0.94 CM/S
RA MAJOR: 6.32 CM
RA PRESSURE: 8 MMHG
RA WIDTH: 3.97 CM
RBC # BLD AUTO: 4.51 M/UL (ref 4.6–6.2)
RIGHT VENTRICULAR END-DIASTOLIC DIMENSION: 4.09 CM
RV TISSUE DOPPLER FREE WALL SYSTOLIC VELOCITY 1 (APICAL 4 CHAMBER VIEW): 12.43 CM/S
SARS-COV-2 RDRP RESP QL NAA+PROBE: NEGATIVE
SINUS: 3.72 CM
SODIUM SERPL-SCNC: 142 MMOL/L (ref 136–145)
STJ: 3.34 CM
TR MAX PG: 8 MMHG
TRICUSPID ANNULAR PLANE SYSTOLIC EXCURSION: 2.24 CM
TROPONIN I SERPL DL<=0.01 NG/ML-MCNC: 0.01 NG/ML (ref 0–0.03)
TROPONIN I SERPL DL<=0.01 NG/ML-MCNC: 0.01 NG/ML (ref 0–0.03)
TROPONIN I SERPL DL<=0.01 NG/ML-MCNC: <0.006 NG/ML (ref 0–0.03)
TV REST PULMONARY ARTERY PRESSURE: 16 MMHG
WBC # BLD AUTO: 6.77 K/UL (ref 3.9–12.7)

## 2021-01-08 PROCEDURE — 96372 THER/PROPH/DIAG INJ SC/IM: CPT | Mod: 59

## 2021-01-08 PROCEDURE — U0002 COVID-19 LAB TEST NON-CDC: HCPCS | Performed by: EMERGENCY MEDICINE

## 2021-01-08 PROCEDURE — 80053 COMPREHEN METABOLIC PANEL: CPT

## 2021-01-08 PROCEDURE — 25000003 PHARM REV CODE 250: Performed by: PHYSICIAN ASSISTANT

## 2021-01-08 PROCEDURE — 99284 PR EMERGENCY DEPT VISIT,LEVEL IV: ICD-10-PCS | Mod: 25,,, | Performed by: INTERNAL MEDICINE

## 2021-01-08 PROCEDURE — 63600175 PHARM REV CODE 636 W HCPCS: Performed by: EMERGENCY MEDICINE

## 2021-01-08 PROCEDURE — G0378 HOSPITAL OBSERVATION PER HR: HCPCS

## 2021-01-08 PROCEDURE — 99284 EMERGENCY DEPT VISIT MOD MDM: CPT | Mod: 25,,, | Performed by: INTERNAL MEDICINE

## 2021-01-08 PROCEDURE — 84484 ASSAY OF TROPONIN QUANT: CPT | Mod: 91

## 2021-01-08 PROCEDURE — 93010 ELECTROCARDIOGRAM REPORT: CPT | Mod: ,,, | Performed by: INTERNAL MEDICINE

## 2021-01-08 PROCEDURE — 83880 ASSAY OF NATRIURETIC PEPTIDE: CPT

## 2021-01-08 PROCEDURE — 83036 HEMOGLOBIN GLYCOSYLATED A1C: CPT

## 2021-01-08 PROCEDURE — 85025 COMPLETE CBC W/AUTO DIFF WBC: CPT

## 2021-01-08 PROCEDURE — 93010 EKG 12-LEAD: ICD-10-PCS | Mod: ,,, | Performed by: INTERNAL MEDICINE

## 2021-01-08 PROCEDURE — 25000003 PHARM REV CODE 250: Performed by: EMERGENCY MEDICINE

## 2021-01-08 PROCEDURE — 93005 ELECTROCARDIOGRAM TRACING: CPT

## 2021-01-08 PROCEDURE — 84484 ASSAY OF TROPONIN QUANT: CPT

## 2021-01-08 PROCEDURE — 25000003 PHARM REV CODE 250: Performed by: INTERNAL MEDICINE

## 2021-01-08 PROCEDURE — 99291 CRITICAL CARE FIRST HOUR: CPT | Mod: 25

## 2021-01-08 RX ORDER — FAMOTIDINE 20 MG/1
40 TABLET, FILM COATED ORAL 2 TIMES DAILY
Status: DISCONTINUED | OUTPATIENT
Start: 2021-01-08 | End: 2021-01-09 | Stop reason: HOSPADM

## 2021-01-08 RX ORDER — ATORVASTATIN CALCIUM 40 MG/1
40 TABLET, FILM COATED ORAL DAILY
Status: DISCONTINUED | OUTPATIENT
Start: 2021-01-09 | End: 2021-01-09 | Stop reason: HOSPADM

## 2021-01-08 RX ORDER — METOPROLOL TARTRATE 25 MG/1
25 TABLET, FILM COATED ORAL DAILY
Status: DISCONTINUED | OUTPATIENT
Start: 2021-01-09 | End: 2021-01-09 | Stop reason: HOSPADM

## 2021-01-08 RX ORDER — ACETAMINOPHEN 500 MG
500 TABLET ORAL EVERY 6 HOURS PRN
Status: DISCONTINUED | OUTPATIENT
Start: 2021-01-08 | End: 2021-01-09 | Stop reason: HOSPADM

## 2021-01-08 RX ORDER — AMOXICILLIN 250 MG
1 CAPSULE ORAL DAILY PRN
Status: DISCONTINUED | OUTPATIENT
Start: 2021-01-08 | End: 2021-01-09 | Stop reason: HOSPADM

## 2021-01-08 RX ORDER — IBUPROFEN 200 MG
24 TABLET ORAL
Status: DISCONTINUED | OUTPATIENT
Start: 2021-01-08 | End: 2021-01-09 | Stop reason: HOSPADM

## 2021-01-08 RX ORDER — IBUPROFEN 200 MG
16 TABLET ORAL
Status: DISCONTINUED | OUTPATIENT
Start: 2021-01-08 | End: 2021-01-09 | Stop reason: HOSPADM

## 2021-01-08 RX ORDER — IBUPROFEN 200 MG
16 TABLET ORAL
Status: DISCONTINUED | OUTPATIENT
Start: 2021-01-08 | End: 2021-01-08

## 2021-01-08 RX ORDER — CLOPIDOGREL BISULFATE 75 MG/1
75 TABLET ORAL DAILY
Status: DISCONTINUED | OUTPATIENT
Start: 2021-01-09 | End: 2021-01-09 | Stop reason: HOSPADM

## 2021-01-08 RX ORDER — NAPROXEN SODIUM 220 MG/1
81 TABLET, FILM COATED ORAL DAILY
Status: DISCONTINUED | OUTPATIENT
Start: 2021-01-08 | End: 2021-01-09 | Stop reason: HOSPADM

## 2021-01-08 RX ORDER — SODIUM CHLORIDE 0.9 % (FLUSH) 0.9 %
10 SYRINGE (ML) INJECTION
Status: DISCONTINUED | OUTPATIENT
Start: 2021-01-08 | End: 2021-01-09 | Stop reason: HOSPADM

## 2021-01-08 RX ORDER — IBUPROFEN 200 MG
24 TABLET ORAL
Status: DISCONTINUED | OUTPATIENT
Start: 2021-01-08 | End: 2021-01-08

## 2021-01-08 RX ORDER — ISOSORBIDE MONONITRATE 30 MG/1
30 TABLET, EXTENDED RELEASE ORAL DAILY
Status: DISCONTINUED | OUTPATIENT
Start: 2021-01-09 | End: 2021-01-09 | Stop reason: HOSPADM

## 2021-01-08 RX ORDER — INSULIN ASPART 100 [IU]/ML
0-5 INJECTION, SOLUTION INTRAVENOUS; SUBCUTANEOUS
Status: DISCONTINUED | OUTPATIENT
Start: 2021-01-08 | End: 2021-01-09 | Stop reason: HOSPADM

## 2021-01-08 RX ORDER — ENOXAPARIN SODIUM 100 MG/ML
40 INJECTION SUBCUTANEOUS
Status: COMPLETED | OUTPATIENT
Start: 2021-01-08 | End: 2021-01-08

## 2021-01-08 RX ORDER — ENOXAPARIN SODIUM 100 MG/ML
1 INJECTION SUBCUTANEOUS
Status: DISCONTINUED | OUTPATIENT
Start: 2021-01-09 | End: 2021-01-09 | Stop reason: HOSPADM

## 2021-01-08 RX ORDER — CLOPIDOGREL 300 MG/1
300 TABLET, FILM COATED ORAL ONCE
Status: COMPLETED | OUTPATIENT
Start: 2021-01-08 | End: 2021-01-08

## 2021-01-08 RX ORDER — GLUCAGON 1 MG
1 KIT INJECTION
Status: DISCONTINUED | OUTPATIENT
Start: 2021-01-08 | End: 2021-01-08

## 2021-01-08 RX ORDER — GLUCAGON 1 MG
1 KIT INJECTION
Status: DISCONTINUED | OUTPATIENT
Start: 2021-01-08 | End: 2021-01-09 | Stop reason: HOSPADM

## 2021-01-08 RX ORDER — SODIUM CHLORIDE 9 MG/ML
INJECTION, SOLUTION INTRAVENOUS
Status: COMPLETED | OUTPATIENT
Start: 2021-01-08 | End: 2021-01-08

## 2021-01-08 RX ADMIN — SODIUM CHLORIDE 125 ML/HR: 0.9 INJECTION, SOLUTION INTRAVENOUS at 03:01

## 2021-01-08 RX ADMIN — ASPIRIN 81 MG: 81 TABLET, CHEWABLE ORAL at 03:01

## 2021-01-08 RX ADMIN — ENOXAPARIN SODIUM 40 MG: 40 INJECTION SUBCUTANEOUS at 03:01

## 2021-01-08 RX ADMIN — FAMOTIDINE 40 MG: 20 TABLET, FILM COATED ORAL at 08:01

## 2021-01-08 RX ADMIN — CLOPIDOGREL BISULFATE 300 MG: 300 TABLET, FILM COATED ORAL at 03:01

## 2021-01-09 DIAGNOSIS — I48.19 PERSISTENT ATRIAL FIBRILLATION: Primary | ICD-10-CM

## 2021-01-09 DIAGNOSIS — I48.19 PERSISTENT ATRIAL FIBRILLATION: ICD-10-CM

## 2021-01-09 DIAGNOSIS — I10 ESSENTIAL HYPERTENSION: ICD-10-CM

## 2021-01-09 DIAGNOSIS — I25.10 CORONARY ARTERY DISEASE INVOLVING NATIVE CORONARY ARTERY OF NATIVE HEART WITHOUT ANGINA PECTORIS: ICD-10-CM

## 2021-01-09 LAB
ESTIMATED AVG GLUCOSE: 126 MG/DL (ref 68–131)
HBA1C MFR BLD HPLC: 6 % (ref 4–5.6)

## 2021-01-09 PROCEDURE — G0378 HOSPITAL OBSERVATION PER HR: HCPCS

## 2021-01-09 RX ORDER — AMIODARONE HYDROCHLORIDE 200 MG/1
200 TABLET ORAL 2 TIMES DAILY
Qty: 60 TABLET | Refills: 0 | Status: SHIPPED | OUTPATIENT
Start: 2021-01-09 | End: 2022-03-11 | Stop reason: SDUPTHER

## 2021-01-09 RX ORDER — AMIODARONE HYDROCHLORIDE 200 MG/1
TABLET ORAL
Qty: 180 TABLET | OUTPATIENT
Start: 2021-01-09

## 2021-01-09 RX ORDER — ISOSORBIDE MONONITRATE 30 MG/1
30 TABLET, EXTENDED RELEASE ORAL DAILY
Qty: 30 TABLET | Refills: 2 | Status: SHIPPED | OUTPATIENT
Start: 2021-01-09 | End: 2021-04-20

## 2021-01-09 RX ORDER — METOPROLOL TARTRATE 50 MG/1
50 TABLET ORAL DAILY
Qty: 30 TABLET | Refills: 2 | Status: SHIPPED | OUTPATIENT
Start: 2021-01-09 | End: 2022-01-03 | Stop reason: SDUPTHER

## 2021-01-11 DIAGNOSIS — N18.30 TYPE 2 DIABETES MELLITUS WITH STAGE 3 CHRONIC KIDNEY DISEASE, WITHOUT LONG-TERM CURRENT USE OF INSULIN: ICD-10-CM

## 2021-01-11 DIAGNOSIS — E11.22 TYPE 2 DIABETES MELLITUS WITH STAGE 3 CHRONIC KIDNEY DISEASE, WITHOUT LONG-TERM CURRENT USE OF INSULIN: ICD-10-CM

## 2021-02-15 DIAGNOSIS — E11.22 TYPE 2 DIABETES MELLITUS WITH STAGE 3 CHRONIC KIDNEY DISEASE, WITHOUT LONG-TERM CURRENT USE OF INSULIN: ICD-10-CM

## 2021-02-15 DIAGNOSIS — N18.30 TYPE 2 DIABETES MELLITUS WITH STAGE 3 CHRONIC KIDNEY DISEASE, WITHOUT LONG-TERM CURRENT USE OF INSULIN: ICD-10-CM

## 2021-02-15 RX ORDER — METFORMIN HYDROCHLORIDE 1000 MG/1
1000 TABLET ORAL 2 TIMES DAILY WITH MEALS
Qty: 180 TABLET | Refills: 1 | Status: SHIPPED | OUTPATIENT
Start: 2021-02-15 | End: 2021-10-14

## 2021-03-31 ENCOUNTER — TELEPHONE (OUTPATIENT)
Dept: FAMILY MEDICINE | Facility: CLINIC | Age: 71
End: 2021-03-31

## 2021-03-31 DIAGNOSIS — Z12.5 SPECIAL SCREENING FOR MALIGNANT NEOPLASM OF PROSTATE: ICD-10-CM

## 2021-03-31 DIAGNOSIS — K21.9 GASTROESOPHAGEAL REFLUX DISEASE WITHOUT ESOPHAGITIS: ICD-10-CM

## 2021-03-31 DIAGNOSIS — N18.32 TYPE 2 DIABETES MELLITUS WITH STAGE 3B CHRONIC KIDNEY DISEASE, WITHOUT LONG-TERM CURRENT USE OF INSULIN: Primary | ICD-10-CM

## 2021-03-31 DIAGNOSIS — E11.22 TYPE 2 DIABETES MELLITUS WITH STAGE 3B CHRONIC KIDNEY DISEASE, WITHOUT LONG-TERM CURRENT USE OF INSULIN: Primary | ICD-10-CM

## 2021-04-01 RX ORDER — FAMOTIDINE 40 MG/1
40 TABLET, FILM COATED ORAL 2 TIMES DAILY
Qty: 180 TABLET | Refills: 1 | Status: SHIPPED | OUTPATIENT
Start: 2021-04-01 | End: 2022-01-03 | Stop reason: SDUPTHER

## 2021-04-12 ENCOUNTER — LAB VISIT (OUTPATIENT)
Dept: LAB | Facility: HOSPITAL | Age: 71
End: 2021-04-12
Attending: FAMILY MEDICINE
Payer: MEDICARE

## 2021-04-12 DIAGNOSIS — E11.9 TYPE 2 DIABETES MELLITUS WITHOUT COMPLICATION: ICD-10-CM

## 2021-04-12 DIAGNOSIS — Z12.5 SPECIAL SCREENING FOR MALIGNANT NEOPLASM OF PROSTATE: ICD-10-CM

## 2021-04-12 DIAGNOSIS — E11.22 TYPE 2 DIABETES MELLITUS WITH STAGE 3B CHRONIC KIDNEY DISEASE, WITHOUT LONG-TERM CURRENT USE OF INSULIN: ICD-10-CM

## 2021-04-12 DIAGNOSIS — N18.32 TYPE 2 DIABETES MELLITUS WITH STAGE 3B CHRONIC KIDNEY DISEASE, WITHOUT LONG-TERM CURRENT USE OF INSULIN: ICD-10-CM

## 2021-04-12 LAB
ANION GAP SERPL CALC-SCNC: 11 MMOL/L (ref 8–16)
BUN SERPL-MCNC: 30 MG/DL (ref 8–23)
CALCIUM SERPL-MCNC: 8.8 MG/DL (ref 8.7–10.5)
CHLORIDE SERPL-SCNC: 110 MMOL/L (ref 95–110)
CHOLEST SERPL-MCNC: 133 MG/DL (ref 120–199)
CHOLEST/HDLC SERPL: 4 {RATIO} (ref 2–5)
CO2 SERPL-SCNC: 19 MMOL/L (ref 23–29)
COMPLEXED PSA SERPL-MCNC: 0.55 NG/ML (ref 0–4)
CREAT SERPL-MCNC: 2.1 MG/DL (ref 0.5–1.4)
EST. GFR  (AFRICAN AMERICAN): 36 ML/MIN/1.73 M^2
EST. GFR  (NON AFRICAN AMERICAN): 31.2 ML/MIN/1.73 M^2
GLUCOSE SERPL-MCNC: 170 MG/DL (ref 70–110)
HDLC SERPL-MCNC: 33 MG/DL (ref 40–75)
HDLC SERPL: 24.8 % (ref 20–50)
LDLC SERPL CALC-MCNC: 80.2 MG/DL (ref 63–159)
NONHDLC SERPL-MCNC: 100 MG/DL
POTASSIUM SERPL-SCNC: 5 MMOL/L (ref 3.5–5.1)
SODIUM SERPL-SCNC: 140 MMOL/L (ref 136–145)
TRIGL SERPL-MCNC: 99 MG/DL (ref 30–150)

## 2021-04-12 PROCEDURE — 80048 BASIC METABOLIC PNL TOTAL CA: CPT | Performed by: FAMILY MEDICINE

## 2021-04-12 PROCEDURE — 80061 LIPID PANEL: CPT | Performed by: FAMILY MEDICINE

## 2021-04-12 PROCEDURE — 36415 COLL VENOUS BLD VENIPUNCTURE: CPT | Mod: PO | Performed by: FAMILY MEDICINE

## 2021-04-12 PROCEDURE — 84153 ASSAY OF PSA TOTAL: CPT | Performed by: FAMILY MEDICINE

## 2021-04-20 ENCOUNTER — OFFICE VISIT (OUTPATIENT)
Dept: FAMILY MEDICINE | Facility: CLINIC | Age: 71
End: 2021-04-20
Payer: MEDICARE

## 2021-04-20 DIAGNOSIS — E11.22 TYPE 2 DIABETES MELLITUS WITH STAGE 3B CHRONIC KIDNEY DISEASE, WITHOUT LONG-TERM CURRENT USE OF INSULIN: ICD-10-CM

## 2021-04-20 DIAGNOSIS — I70.0 AORTIC ATHEROSCLEROSIS: ICD-10-CM

## 2021-04-20 DIAGNOSIS — I25.10 CORONARY ARTERY DISEASE INVOLVING NATIVE CORONARY ARTERY OF NATIVE HEART WITHOUT ANGINA PECTORIS: ICD-10-CM

## 2021-04-20 DIAGNOSIS — I10 ESSENTIAL HYPERTENSION: ICD-10-CM

## 2021-04-20 DIAGNOSIS — N18.32 TYPE 2 DIABETES MELLITUS WITH STAGE 3B CHRONIC KIDNEY DISEASE, WITHOUT LONG-TERM CURRENT USE OF INSULIN: ICD-10-CM

## 2021-04-20 DIAGNOSIS — I48.0 PAROXYSMAL ATRIAL FIBRILLATION: Primary | ICD-10-CM

## 2021-04-20 PROCEDURE — 99443 PR PHYSICIAN TELEPHONE EVALUATION 21-30 MIN: ICD-10-PCS | Mod: 95,,, | Performed by: FAMILY MEDICINE

## 2021-04-20 PROCEDURE — 99443 PR PHYSICIAN TELEPHONE EVALUATION 21-30 MIN: CPT | Mod: 95,,, | Performed by: FAMILY MEDICINE

## 2021-04-20 RX ORDER — NITROGLYCERIN 0.4 MG/1
0.4 TABLET SUBLINGUAL 4 TIMES DAILY PRN
COMMUNITY
Start: 2021-04-06

## 2021-04-20 RX ORDER — ISOSORBIDE MONONITRATE 60 MG/1
60 TABLET, EXTENDED RELEASE ORAL DAILY
COMMUNITY
Start: 2021-02-22 | End: 2022-12-22

## 2021-04-20 RX ORDER — LISINOPRIL 5 MG/1
5 TABLET ORAL DAILY
COMMUNITY
Start: 2021-03-05 | End: 2022-01-03 | Stop reason: SDUPTHER

## 2021-04-20 RX ORDER — CLOPIDOGREL BISULFATE 75 MG/1
75 TABLET ORAL DAILY
COMMUNITY
Start: 2021-02-03 | End: 2022-09-08 | Stop reason: SDUPTHER

## 2021-05-05 DIAGNOSIS — E11.9 TYPE 2 DIABETES MELLITUS WITHOUT COMPLICATION: ICD-10-CM

## 2021-07-08 DIAGNOSIS — E11.9 TYPE 2 DIABETES MELLITUS WITHOUT COMPLICATION, WITHOUT LONG-TERM CURRENT USE OF INSULIN: ICD-10-CM

## 2021-07-26 RX ORDER — OFLOXACIN 3 MG/ML
1 SOLUTION/ DROPS OPHTHALMIC 4 TIMES DAILY
Qty: 5 ML | Refills: 1 | Status: SHIPPED | OUTPATIENT
Start: 2021-07-26 | End: 2021-08-02

## 2021-08-04 ENCOUNTER — PATIENT MESSAGE (OUTPATIENT)
Dept: ADMINISTRATIVE | Facility: HOSPITAL | Age: 71
End: 2021-08-04

## 2021-09-30 DIAGNOSIS — E11.22 TYPE 2 DIABETES MELLITUS WITH STAGE 3 CHRONIC KIDNEY DISEASE, WITHOUT LONG-TERM CURRENT USE OF INSULIN: ICD-10-CM

## 2021-09-30 DIAGNOSIS — N18.30 TYPE 2 DIABETES MELLITUS WITH STAGE 3 CHRONIC KIDNEY DISEASE, WITHOUT LONG-TERM CURRENT USE OF INSULIN: ICD-10-CM

## 2021-10-01 RX ORDER — GLIMEPIRIDE 4 MG/1
TABLET ORAL
Qty: 180 TABLET | Refills: 0 | Status: SHIPPED | OUTPATIENT
Start: 2021-10-01 | End: 2022-07-12

## 2021-10-04 ENCOUNTER — PATIENT MESSAGE (OUTPATIENT)
Dept: ADMINISTRATIVE | Facility: HOSPITAL | Age: 71
End: 2021-10-04

## 2021-10-08 ENCOUNTER — LAB VISIT (OUTPATIENT)
Dept: LAB | Facility: HOSPITAL | Age: 71
End: 2021-10-08
Attending: FAMILY MEDICINE
Payer: MEDICARE

## 2021-10-08 ENCOUNTER — OFFICE VISIT (OUTPATIENT)
Dept: FAMILY MEDICINE | Facility: CLINIC | Age: 71
End: 2021-10-08
Payer: MEDICARE

## 2021-10-08 VITALS
BODY MASS INDEX: 41.75 KG/M2 | OXYGEN SATURATION: 98 % | HEIGHT: 73 IN | SYSTOLIC BLOOD PRESSURE: 120 MMHG | TEMPERATURE: 98 F | WEIGHT: 315 LBS | DIASTOLIC BLOOD PRESSURE: 70 MMHG | HEART RATE: 66 BPM | RESPIRATION RATE: 16 BRPM

## 2021-10-08 DIAGNOSIS — E11.22 TYPE 2 DIABETES MELLITUS WITH STAGE 3B CHRONIC KIDNEY DISEASE, WITHOUT LONG-TERM CURRENT USE OF INSULIN: ICD-10-CM

## 2021-10-08 DIAGNOSIS — E11.9 TYPE 2 DIABETES MELLITUS WITHOUT COMPLICATION: ICD-10-CM

## 2021-10-08 DIAGNOSIS — N18.32 TYPE 2 DIABETES MELLITUS WITH STAGE 3B CHRONIC KIDNEY DISEASE, WITHOUT LONG-TERM CURRENT USE OF INSULIN: Primary | ICD-10-CM

## 2021-10-08 DIAGNOSIS — I48.0 PAROXYSMAL ATRIAL FIBRILLATION: ICD-10-CM

## 2021-10-08 DIAGNOSIS — E66.01 MORBID OBESITY WITH BMI OF 45.0-49.9, ADULT: ICD-10-CM

## 2021-10-08 DIAGNOSIS — N18.32 TYPE 2 DIABETES MELLITUS WITH STAGE 3B CHRONIC KIDNEY DISEASE, WITHOUT LONG-TERM CURRENT USE OF INSULIN: ICD-10-CM

## 2021-10-08 DIAGNOSIS — Z23 FLU VACCINE NEED: ICD-10-CM

## 2021-10-08 DIAGNOSIS — E11.22 TYPE 2 DIABETES MELLITUS WITH STAGE 3B CHRONIC KIDNEY DISEASE, WITHOUT LONG-TERM CURRENT USE OF INSULIN: Primary | ICD-10-CM

## 2021-10-08 DIAGNOSIS — I70.0 AORTIC ATHEROSCLEROSIS: ICD-10-CM

## 2021-10-08 LAB
ALBUMIN SERPL BCP-MCNC: 4.1 G/DL (ref 3.5–5.2)
ALP SERPL-CCNC: 90 U/L (ref 55–135)
ALT SERPL W/O P-5'-P-CCNC: 19 U/L (ref 10–44)
ANION GAP SERPL CALC-SCNC: 12 MMOL/L (ref 8–16)
AST SERPL-CCNC: 15 U/L (ref 10–40)
BILIRUB SERPL-MCNC: 1.2 MG/DL (ref 0.1–1)
BUN SERPL-MCNC: 37 MG/DL (ref 8–23)
CALCIUM SERPL-MCNC: 9.3 MG/DL (ref 8.7–10.5)
CHLORIDE SERPL-SCNC: 105 MMOL/L (ref 95–110)
CO2 SERPL-SCNC: 20 MMOL/L (ref 23–29)
CREAT SERPL-MCNC: 2.4 MG/DL (ref 0.5–1.4)
EST. GFR  (AFRICAN AMERICAN): 30.5 ML/MIN/1.73 M^2
EST. GFR  (NON AFRICAN AMERICAN): 26.3 ML/MIN/1.73 M^2
ESTIMATED AVG GLUCOSE: 123 MG/DL (ref 68–131)
GLUCOSE SERPL-MCNC: 151 MG/DL (ref 70–110)
HBA1C MFR BLD: 5.9 % (ref 4–5.6)
POTASSIUM SERPL-SCNC: 4.9 MMOL/L (ref 3.5–5.1)
PROT SERPL-MCNC: 7.2 G/DL (ref 6–8.4)
SODIUM SERPL-SCNC: 137 MMOL/L (ref 136–145)

## 2021-10-08 PROCEDURE — 99214 OFFICE O/P EST MOD 30 MIN: CPT | Mod: PBBFAC,PO | Performed by: PHYSICIAN ASSISTANT

## 2021-10-08 PROCEDURE — G0008 ADMIN INFLUENZA VIRUS VAC: HCPCS | Mod: PBBFAC,PO

## 2021-10-08 PROCEDURE — 80053 COMPREHEN METABOLIC PANEL: CPT | Performed by: PHYSICIAN ASSISTANT

## 2021-10-08 PROCEDURE — 99999 PR PBB SHADOW E&M-EST. PATIENT-LVL IV: ICD-10-PCS | Mod: PBBFAC,,, | Performed by: PHYSICIAN ASSISTANT

## 2021-10-08 PROCEDURE — 90694 VACC AIIV4 NO PRSRV 0.5ML IM: CPT | Mod: PBBFAC,PO

## 2021-10-08 PROCEDURE — 99213 PR OFFICE/OUTPT VISIT, EST, LEVL III, 20-29 MIN: ICD-10-PCS | Mod: S$PBB,,, | Performed by: PHYSICIAN ASSISTANT

## 2021-10-08 PROCEDURE — 83036 HEMOGLOBIN GLYCOSYLATED A1C: CPT | Performed by: FAMILY MEDICINE

## 2021-10-08 PROCEDURE — 99999 PR PBB SHADOW E&M-EST. PATIENT-LVL IV: CPT | Mod: PBBFAC,,, | Performed by: PHYSICIAN ASSISTANT

## 2021-10-08 PROCEDURE — 36415 COLL VENOUS BLD VENIPUNCTURE: CPT | Mod: PO | Performed by: PHYSICIAN ASSISTANT

## 2021-10-08 PROCEDURE — 99213 OFFICE O/P EST LOW 20 MIN: CPT | Mod: S$PBB,,, | Performed by: PHYSICIAN ASSISTANT

## 2021-10-14 ENCOUNTER — TELEPHONE (OUTPATIENT)
Dept: FAMILY MEDICINE | Facility: CLINIC | Age: 71
End: 2021-10-14

## 2021-10-14 DIAGNOSIS — N18.4 STAGE 4 CHRONIC KIDNEY DISEASE: Primary | ICD-10-CM

## 2021-10-18 ENCOUNTER — PATIENT MESSAGE (OUTPATIENT)
Dept: ADMINISTRATIVE | Facility: HOSPITAL | Age: 71
End: 2021-10-18
Payer: MEDICARE

## 2021-11-03 DIAGNOSIS — E11.9 TYPE 2 DIABETES MELLITUS WITHOUT COMPLICATION, WITHOUT LONG-TERM CURRENT USE OF INSULIN: ICD-10-CM

## 2021-11-09 ENCOUNTER — PATIENT OUTREACH (OUTPATIENT)
Dept: ADMINISTRATIVE | Facility: OTHER | Age: 71
End: 2021-11-09
Payer: MEDICARE

## 2021-11-24 ENCOUNTER — PES CALL (OUTPATIENT)
Dept: ADMINISTRATIVE | Facility: CLINIC | Age: 71
End: 2021-11-24
Payer: MEDICARE

## 2021-12-20 ENCOUNTER — PATIENT OUTREACH (OUTPATIENT)
Dept: ADMINISTRATIVE | Facility: OTHER | Age: 71
End: 2021-12-20
Payer: MEDICARE

## 2021-12-22 ENCOUNTER — LAB VISIT (OUTPATIENT)
Dept: LAB | Facility: HOSPITAL | Age: 71
End: 2021-12-22
Payer: MEDICARE

## 2021-12-22 ENCOUNTER — OFFICE VISIT (OUTPATIENT)
Dept: NEPHROLOGY | Facility: CLINIC | Age: 71
End: 2021-12-22
Payer: MEDICARE

## 2021-12-22 VITALS
HEIGHT: 73 IN | SYSTOLIC BLOOD PRESSURE: 140 MMHG | WEIGHT: 315 LBS | DIASTOLIC BLOOD PRESSURE: 80 MMHG | BODY MASS INDEX: 41.75 KG/M2 | HEART RATE: 52 BPM | OXYGEN SATURATION: 98 %

## 2021-12-22 DIAGNOSIS — N20.0 NEPHROLITHIASIS: ICD-10-CM

## 2021-12-22 DIAGNOSIS — N18.4 STAGE 4 CHRONIC KIDNEY DISEASE: ICD-10-CM

## 2021-12-22 DIAGNOSIS — I48.0 PAROXYSMAL ATRIAL FIBRILLATION: ICD-10-CM

## 2021-12-22 DIAGNOSIS — E11.9 DM TYPE 2 WITHOUT RETINOPATHY: ICD-10-CM

## 2021-12-22 DIAGNOSIS — G47.33 OBSTRUCTIVE SLEEP APNEA SYNDROME: ICD-10-CM

## 2021-12-22 DIAGNOSIS — N18.4 STAGE 4 CHRONIC KIDNEY DISEASE: Primary | ICD-10-CM

## 2021-12-22 DIAGNOSIS — R82.90 ABNORMAL URINE SEDIMENT: ICD-10-CM

## 2021-12-22 DIAGNOSIS — E66.01 MORBID OBESITY WITH BMI OF 45.0-49.9, ADULT: ICD-10-CM

## 2021-12-22 LAB
ALBUMIN SERPL BCP-MCNC: 4.1 G/DL (ref 3.5–5.2)
ANION GAP SERPL CALC-SCNC: 7 MMOL/L (ref 8–16)
BASOPHILS # BLD AUTO: 0.1 K/UL (ref 0–0.2)
BASOPHILS NFR BLD: 1.3 % (ref 0–1.9)
BUN SERPL-MCNC: 31 MG/DL (ref 8–23)
CALCIUM SERPL-MCNC: 8.9 MG/DL (ref 8.7–10.5)
CHLORIDE SERPL-SCNC: 106 MMOL/L (ref 95–110)
CO2 SERPL-SCNC: 28 MMOL/L (ref 23–29)
CREAT SERPL-MCNC: 2.2 MG/DL (ref 0.5–1.4)
DIFFERENTIAL METHOD: ABNORMAL
EOSINOPHIL # BLD AUTO: 0.3 K/UL (ref 0–0.5)
EOSINOPHIL NFR BLD: 3.9 % (ref 0–8)
ERYTHROCYTE [DISTWIDTH] IN BLOOD BY AUTOMATED COUNT: 13.4 % (ref 11.5–14.5)
EST. GFR  (AFRICAN AMERICAN): 33.8 ML/MIN/1.73 M^2
EST. GFR  (NON AFRICAN AMERICAN): 29.3 ML/MIN/1.73 M^2
GLUCOSE SERPL-MCNC: 57 MG/DL (ref 70–110)
HCT VFR BLD AUTO: 42 % (ref 40–54)
HGB BLD-MCNC: 13.3 G/DL (ref 14–18)
IMM GRANULOCYTES # BLD AUTO: 0.04 K/UL (ref 0–0.04)
IMM GRANULOCYTES NFR BLD AUTO: 0.5 % (ref 0–0.5)
LYMPHOCYTES # BLD AUTO: 1.4 K/UL (ref 1–4.8)
LYMPHOCYTES NFR BLD: 17.8 % (ref 18–48)
MCH RBC QN AUTO: 30 PG (ref 27–31)
MCHC RBC AUTO-ENTMCNC: 31.7 G/DL (ref 32–36)
MCV RBC AUTO: 95 FL (ref 82–98)
MONOCYTES # BLD AUTO: 0.8 K/UL (ref 0.3–1)
MONOCYTES NFR BLD: 9.8 % (ref 4–15)
NEUTROPHILS # BLD AUTO: 5.3 K/UL (ref 1.8–7.7)
NEUTROPHILS NFR BLD: 66.7 % (ref 38–73)
NRBC BLD-RTO: 0 /100 WBC
PHOSPHATE SERPL-MCNC: 2.8 MG/DL (ref 2.7–4.5)
PLATELET # BLD AUTO: 149 K/UL (ref 150–450)
PMV BLD AUTO: 11.5 FL (ref 9.2–12.9)
POTASSIUM SERPL-SCNC: 4.7 MMOL/L (ref 3.5–5.1)
RBC # BLD AUTO: 4.43 M/UL (ref 4.6–6.2)
SODIUM SERPL-SCNC: 141 MMOL/L (ref 136–145)
URATE SERPL-MCNC: 8.6 MG/DL (ref 3.4–7)
WBC # BLD AUTO: 7.94 K/UL (ref 3.9–12.7)

## 2021-12-22 PROCEDURE — 86334 PATHOLOGIST INTERPRETATION IFE: ICD-10-PCS | Mod: 26,,, | Performed by: PATHOLOGY

## 2021-12-22 PROCEDURE — 99215 OFFICE O/P EST HI 40 MIN: CPT | Mod: S$PBB,,, | Performed by: NURSE PRACTITIONER

## 2021-12-22 PROCEDURE — 84165 PROTEIN E-PHORESIS SERUM: CPT | Mod: 26,,, | Performed by: PATHOLOGY

## 2021-12-22 PROCEDURE — 99417 PR PROLONGED SVC, OUTPT, W/WO DIRECT PT CONTACT,  EA ADDTL 15 MIN: ICD-10-PCS | Mod: S$PBB,,, | Performed by: NURSE PRACTITIONER

## 2021-12-22 PROCEDURE — 86334 IMMUNOFIX E-PHORESIS SERUM: CPT | Mod: 26,,, | Performed by: PATHOLOGY

## 2021-12-22 PROCEDURE — 85025 COMPLETE CBC W/AUTO DIFF WBC: CPT | Performed by: NURSE PRACTITIONER

## 2021-12-22 PROCEDURE — 99999 PR PBB SHADOW E&M-EST. PATIENT-LVL IV: CPT | Mod: PBBFAC,,, | Performed by: NURSE PRACTITIONER

## 2021-12-22 PROCEDURE — 86334 IMMUNOFIX E-PHORESIS SERUM: CPT | Performed by: NURSE PRACTITIONER

## 2021-12-22 PROCEDURE — 99215 PR OFFICE/OUTPT VISIT, EST, LEVL V, 40-54 MIN: ICD-10-PCS | Mod: S$PBB,,, | Performed by: NURSE PRACTITIONER

## 2021-12-22 PROCEDURE — 99999 PR PBB SHADOW E&M-EST. PATIENT-LVL IV: ICD-10-PCS | Mod: PBBFAC,,, | Performed by: NURSE PRACTITIONER

## 2021-12-22 PROCEDURE — 80069 RENAL FUNCTION PANEL: CPT | Performed by: NURSE PRACTITIONER

## 2021-12-22 PROCEDURE — 84165 PROTEIN E-PHORESIS SERUM: CPT | Performed by: NURSE PRACTITIONER

## 2021-12-22 PROCEDURE — 99417 PROLNG OP E/M EACH 15 MIN: CPT | Mod: S$PBB,,, | Performed by: NURSE PRACTITIONER

## 2021-12-22 PROCEDURE — 99214 OFFICE O/P EST MOD 30 MIN: CPT | Mod: PBBFAC | Performed by: NURSE PRACTITIONER

## 2021-12-22 PROCEDURE — 36415 COLL VENOUS BLD VENIPUNCTURE: CPT | Performed by: NURSE PRACTITIONER

## 2021-12-22 PROCEDURE — 84165 PATHOLOGIST INTERPRETATION SPE: ICD-10-PCS | Mod: 26,,, | Performed by: PATHOLOGY

## 2021-12-22 PROCEDURE — 84550 ASSAY OF BLOOD/URIC ACID: CPT | Performed by: NURSE PRACTITIONER

## 2021-12-22 PROCEDURE — 83520 IMMUNOASSAY QUANT NOS NONAB: CPT | Performed by: NURSE PRACTITIONER

## 2021-12-23 LAB
ALBUMIN SERPL ELPH-MCNC: 4.21 G/DL (ref 3.35–5.55)
ALPHA1 GLOB SERPL ELPH-MCNC: 0.31 G/DL (ref 0.17–0.41)
ALPHA2 GLOB SERPL ELPH-MCNC: 0.88 G/DL (ref 0.43–0.99)
B-GLOBULIN SERPL ELPH-MCNC: 0.67 G/DL (ref 0.5–1.1)
GAMMA GLOB SERPL ELPH-MCNC: 0.92 G/DL (ref 0.67–1.58)
INTERPRETATION SERPL IFE-IMP: NORMAL
KAPPA LC SER QL IA: 6.3 MG/DL (ref 0.33–1.94)
KAPPA LC/LAMBDA SER IA: 2.42 (ref 0.26–1.65)
LAMBDA LC SER QL IA: 2.6 MG/DL (ref 0.57–2.63)
PROT SERPL-MCNC: 7 G/DL (ref 6–8.4)

## 2021-12-27 LAB
PATHOLOGIST INTERPRETATION IFE: NORMAL
PATHOLOGIST INTERPRETATION SPE: NORMAL

## 2021-12-28 ENCOUNTER — HOSPITAL ENCOUNTER (OUTPATIENT)
Dept: RADIOLOGY | Facility: HOSPITAL | Age: 71
Discharge: HOME | End: 2021-12-28
Attending: NURSE PRACTITIONER
Payer: MEDICARE

## 2021-12-28 DIAGNOSIS — N18.4 STAGE 4 CHRONIC KIDNEY DISEASE: ICD-10-CM

## 2021-12-28 PROCEDURE — 76770 US EXAM ABDO BACK WALL COMP: CPT | Mod: TC

## 2021-12-28 PROCEDURE — 76770 US EXAM ABDO BACK WALL COMP: CPT | Mod: 26,,, | Performed by: RADIOLOGY

## 2021-12-28 PROCEDURE — 76770 US RETROPERITONEAL COMPLETE: ICD-10-PCS | Mod: 26,,, | Performed by: RADIOLOGY

## 2021-12-30 ENCOUNTER — TELEPHONE (OUTPATIENT)
Dept: NEPHROLOGY | Facility: CLINIC | Age: 71
End: 2021-12-30
Payer: MEDICARE

## 2021-12-30 ENCOUNTER — PATIENT MESSAGE (OUTPATIENT)
Dept: NEPHROLOGY | Facility: CLINIC | Age: 71
End: 2021-12-30
Payer: MEDICARE

## 2021-12-30 DIAGNOSIS — N20.0 NEPHROLITH: Primary | ICD-10-CM

## 2021-12-30 DIAGNOSIS — R82.90 ABNORMAL URINE SEDIMENT: ICD-10-CM

## 2022-01-03 ENCOUNTER — LAB VISIT (OUTPATIENT)
Dept: LAB | Facility: HOSPITAL | Age: 72
End: 2022-01-03
Payer: MEDICARE

## 2022-01-03 DIAGNOSIS — N20.0 NEPHROLITH: ICD-10-CM

## 2022-01-03 DIAGNOSIS — I48.19 PERSISTENT ATRIAL FIBRILLATION: ICD-10-CM

## 2022-01-03 DIAGNOSIS — E78.00 PURE HYPERCHOLESTEROLEMIA: ICD-10-CM

## 2022-01-03 DIAGNOSIS — R82.90 ABNORMAL URINE SEDIMENT: ICD-10-CM

## 2022-01-03 DIAGNOSIS — I25.10 CORONARY ARTERY DISEASE INVOLVING NATIVE CORONARY ARTERY OF NATIVE HEART WITHOUT ANGINA PECTORIS: ICD-10-CM

## 2022-01-03 DIAGNOSIS — E11.9 TYPE 2 DIABETES MELLITUS WITHOUT COMPLICATION, WITHOUT LONG-TERM CURRENT USE OF INSULIN: ICD-10-CM

## 2022-01-03 DIAGNOSIS — K21.9 GASTROESOPHAGEAL REFLUX DISEASE WITHOUT ESOPHAGITIS: ICD-10-CM

## 2022-01-03 PROCEDURE — 82507 ASSAY OF CITRATE: CPT | Performed by: NURSE PRACTITIONER

## 2022-01-03 RX ORDER — FAMOTIDINE 40 MG/1
40 TABLET, FILM COATED ORAL 2 TIMES DAILY
Qty: 180 TABLET | Refills: 0 | Status: SHIPPED | OUTPATIENT
Start: 2022-01-03 | End: 2022-04-07

## 2022-01-03 RX ORDER — ATORVASTATIN CALCIUM 40 MG/1
40 TABLET, FILM COATED ORAL DAILY
Qty: 90 TABLET | Refills: 0 | Status: SHIPPED | OUTPATIENT
Start: 2022-01-03 | End: 2022-04-06

## 2022-01-03 RX ORDER — LISINOPRIL 5 MG/1
5 TABLET ORAL DAILY
Qty: 90 TABLET | Refills: 0 | Status: SHIPPED | OUTPATIENT
Start: 2022-01-03 | End: 2022-03-30 | Stop reason: SDUPTHER

## 2022-01-03 RX ORDER — METOPROLOL TARTRATE 50 MG/1
50 TABLET ORAL DAILY
Qty: 90 TABLET | Refills: 0 | Status: SHIPPED | OUTPATIENT
Start: 2022-01-03 | End: 2022-04-11

## 2022-01-03 NOTE — TELEPHONE ENCOUNTER
----- Message from Lisy Valerio sent at 1/3/2022 11:25 AM CST -----  Refill : lisinopriL (PRINIVIL,ZESTRIL) 5 MG tablet        [facility] #[id number] - [place] [address] AT [address]  [place] [address]  Phone: [phone] Fax: [phone]

## 2022-01-03 NOTE — TELEPHONE ENCOUNTER
No new care gaps identified.  Powered by [employer] by [facility]. Reference number: [id number].   1/03/2022 10:46:31 AM CST

## 2022-01-04 ENCOUNTER — TELEPHONE (OUTPATIENT)
Dept: FAMILY MEDICINE | Facility: CLINIC | Age: 72
End: 2022-01-04
Payer: MEDICARE

## 2022-01-04 NOTE — TELEPHONE ENCOUNTER
----- Message from Yvette Frazier sent at 1/4/2022  2:43 PM CST -----  Type: RX Refill Request    Who Called: pt    Have you contacted your pharmacy:    Refill or New Rx:atorvastatin (LIPITOR) 40 MG tablet - needs pa    RX Name and Strength:    How is the patient currently taking it? (ex. 1XDay):    Is this a 30 day or 90 day RX:    Preferred Pharmacy with phone number:   [facility] #[id number] - [address] AT [address] [address]  Phone: [phone] Fax: [phone]        Local or Mail Order:    Ordering Provider:    Would the patient rather a call back or a response via [facility]?     Best Call Back Number:[phone] (home)     Additional Information:

## 2022-01-07 LAB
AMMONIA 24H UR-SRATE: 35 MMOL/24 H (ref 15–56)
CA H2 PHOS DIHYD 24H SATFR UR: ABNORMAL
CALCIUM 24H UR-MRATE: 48 MG/24 H
CAOX 24H ENGDIFF UR: ABNORMAL
CHLORIDE 24H UR-SRATE: 257 MMOL/24 H
CITRATE 24H UR-MRATE: ABNORMAL MG/(24.H)
CLINICAL BIOCHEMIST REVIEW: ABNORMAL
COLLECT DURATION TIME UR: 24 H
CREAT 24H UR-MRATE: 2856 MG/24 H (ref 930–2955)
HYDROXYAPATITE 24H ENGDIFF UR: ABNORMAL
MAGNESIUM 24H UR-MRATE: 96 MG/24 H (ref 51–269)
NA URATE 24H SATFR UR: -0.79 DG
OSMOLALITY 24H UR: 667 MOSM/KG (ref 150–1150)
OXALATE 24H UR-MRATE: 52.8 MG/24 H (ref 9.7–40.5)
OXALATE 24H UR-SRATE: 0.6 MMOL/24 H (ref 0.11–0.46)
PH 24H UR: 5.3 [PH] (ref 4.5–8)
PHOSPHATE 24H UR-MRATE: 1464 MG/24 H (ref 226–1797)
POTASSIUM 24H UR-SRATE: 96 MMOL/24 H (ref 16–105)
PROTEIN CATABOLIC RATE, URINE: 166 G/24 H (ref 56–125)
SODIUM 24H UR-SRATE: 307 MMOL/24 H (ref 22–328)
SPECIMEN VOL 24H UR: 5 L
SULFATE 24H UR-SRATE: 43 MMOL/24 H (ref 7–47)
URATE 24H SATFR UR: -0.21 DG
URATE 24H UR-MRATE: 192 MG/24 H (ref 200–1000)
UUN 24H UR-MRATE: 22.5 G/24 H (ref 7–42)

## 2022-01-12 ENCOUNTER — PATIENT MESSAGE (OUTPATIENT)
Dept: NEPHROLOGY | Facility: CLINIC | Age: 72
End: 2022-01-12
Payer: MEDICARE

## 2022-01-12 DIAGNOSIS — N20.0 NEPHROLITH: Primary | ICD-10-CM

## 2022-01-12 DIAGNOSIS — N18.4 STAGE 4 CHRONIC KIDNEY DISEASE: ICD-10-CM

## 2022-01-12 RX ORDER — POTASSIUM CITRATE 5 MEQ/1
10 TABLET, EXTENDED RELEASE ORAL DAILY
Qty: 180 TABLET | Refills: 3 | Status: SHIPPED | OUTPATIENT
Start: 2022-01-12 | End: 2023-02-08

## 2022-01-13 ENCOUNTER — TELEPHONE (OUTPATIENT)
Dept: NEPHROLOGY | Facility: CLINIC | Age: 72
End: 2022-01-13
Payer: MEDICARE

## 2022-01-13 NOTE — TELEPHONE ENCOUNTER
----- Message from Olive Muhammad NP sent at 1/12/2022  3:23 PM CST -----  Pls schedule BMP for ~2 weeks from now.

## 2022-01-24 ENCOUNTER — PES CALL (OUTPATIENT)
Dept: ADMINISTRATIVE | Facility: CLINIC | Age: 72
End: 2022-01-24
Payer: MEDICARE

## 2022-01-27 ENCOUNTER — LAB VISIT (OUTPATIENT)
Dept: LAB | Facility: HOSPITAL | Age: 72
End: 2022-01-27
Attending: NURSE PRACTITIONER
Payer: MEDICARE

## 2022-01-27 DIAGNOSIS — N20.0 NEPHROLITH: ICD-10-CM

## 2022-01-27 DIAGNOSIS — N18.4 STAGE 4 CHRONIC KIDNEY DISEASE: ICD-10-CM

## 2022-01-27 LAB
ANION GAP SERPL CALC-SCNC: 6 MMOL/L (ref 8–16)
BUN SERPL-MCNC: 33 MG/DL (ref 8–23)
CALCIUM SERPL-MCNC: 9.4 MG/DL (ref 8.7–10.5)
CHLORIDE SERPL-SCNC: 104 MMOL/L (ref 95–110)
CO2 SERPL-SCNC: 27 MMOL/L (ref 23–29)
CREAT SERPL-MCNC: 2.4 MG/DL (ref 0.5–1.4)
EST. GFR  (AFRICAN AMERICAN): 30.5 ML/MIN/1.73 M^2
EST. GFR  (NON AFRICAN AMERICAN): 26.3 ML/MIN/1.73 M^2
GLUCOSE SERPL-MCNC: 115 MG/DL (ref 70–110)
POTASSIUM SERPL-SCNC: 5.2 MMOL/L (ref 3.5–5.1)
SODIUM SERPL-SCNC: 137 MMOL/L (ref 136–145)

## 2022-01-27 PROCEDURE — 80048 BASIC METABOLIC PNL TOTAL CA: CPT | Performed by: NURSE PRACTITIONER

## 2022-01-27 PROCEDURE — 36415 COLL VENOUS BLD VENIPUNCTURE: CPT | Mod: PO | Performed by: NURSE PRACTITIONER

## 2022-01-28 ENCOUNTER — PATIENT MESSAGE (OUTPATIENT)
Dept: NEPHROLOGY | Facility: CLINIC | Age: 72
End: 2022-01-28
Payer: MEDICARE

## 2022-02-04 ENCOUNTER — IMMUNIZATION (OUTPATIENT)
Dept: PHARMACY | Facility: CLINIC | Age: 72
End: 2022-02-04
Payer: MEDICARE

## 2022-02-04 DIAGNOSIS — Z23 NEED FOR VACCINATION: Primary | ICD-10-CM

## 2022-02-23 LAB
CITRATE 24H UR-MRATE: 390 MG/24 H (ref 100–1300)
CITRATE/CREAT 24H UR: 148 MG/G CREAT (ref 60–660)
COLLECT DURATION TIME UR: 24 H
CREAT 24H UR-MRATE: 2.64 G/24 H (ref 0.5–2.15)
SPECIMEN VOL 24H UR: 2400 ML

## 2022-03-03 ENCOUNTER — TELEPHONE (OUTPATIENT)
Dept: FAMILY MEDICINE | Facility: CLINIC | Age: 72
End: 2022-03-03
Payer: MEDICARE

## 2022-03-03 NOTE — TELEPHONE ENCOUNTER
Calling pt to get updated BP readings as bp was elevated at last office visit.  Lm for pt to call our office back.

## 2022-03-09 ENCOUNTER — LAB VISIT (OUTPATIENT)
Dept: LAB | Facility: HOSPITAL | Age: 72
End: 2022-03-09
Attending: NURSE PRACTITIONER
Payer: MEDICARE

## 2022-03-09 DIAGNOSIS — N18.4 STAGE 4 CHRONIC KIDNEY DISEASE: ICD-10-CM

## 2022-03-09 LAB
ALBUMIN SERPL BCP-MCNC: 4 G/DL (ref 3.5–5.2)
ANION GAP SERPL CALC-SCNC: 10 MMOL/L (ref 8–16)
BASOPHILS # BLD AUTO: 0.09 K/UL (ref 0–0.2)
BASOPHILS NFR BLD: 1.2 % (ref 0–1.9)
BUN SERPL-MCNC: 24 MG/DL (ref 8–23)
CALCIUM SERPL-MCNC: 9.2 MG/DL (ref 8.7–10.5)
CHLORIDE SERPL-SCNC: 103 MMOL/L (ref 95–110)
CO2 SERPL-SCNC: 25 MMOL/L (ref 23–29)
CREAT SERPL-MCNC: 1.8 MG/DL (ref 0.5–1.4)
DIFFERENTIAL METHOD: ABNORMAL
EOSINOPHIL # BLD AUTO: 0.2 K/UL (ref 0–0.5)
EOSINOPHIL NFR BLD: 2.9 % (ref 0–8)
ERYTHROCYTE [DISTWIDTH] IN BLOOD BY AUTOMATED COUNT: 13.3 % (ref 11.5–14.5)
EST. GFR  (AFRICAN AMERICAN): 43.1 ML/MIN/1.73 M^2
EST. GFR  (NON AFRICAN AMERICAN): 37.3 ML/MIN/1.73 M^2
GLUCOSE SERPL-MCNC: 131 MG/DL (ref 70–110)
HCT VFR BLD AUTO: 41.2 % (ref 40–54)
HGB BLD-MCNC: 13.5 G/DL (ref 14–18)
IMM GRANULOCYTES # BLD AUTO: 0.03 K/UL (ref 0–0.04)
IMM GRANULOCYTES NFR BLD AUTO: 0.4 % (ref 0–0.5)
LYMPHOCYTES # BLD AUTO: 1 K/UL (ref 1–4.8)
LYMPHOCYTES NFR BLD: 12.9 % (ref 18–48)
MCH RBC QN AUTO: 30.9 PG (ref 27–31)
MCHC RBC AUTO-ENTMCNC: 32.8 G/DL (ref 32–36)
MCV RBC AUTO: 94 FL (ref 82–98)
MONOCYTES # BLD AUTO: 0.8 K/UL (ref 0.3–1)
MONOCYTES NFR BLD: 10.5 % (ref 4–15)
NEUTROPHILS # BLD AUTO: 5.4 K/UL (ref 1.8–7.7)
NEUTROPHILS NFR BLD: 72.1 % (ref 38–73)
NRBC BLD-RTO: 0 /100 WBC
PHOSPHATE SERPL-MCNC: 3.2 MG/DL (ref 2.7–4.5)
PLATELET # BLD AUTO: 149 K/UL (ref 150–450)
PMV BLD AUTO: 12.3 FL (ref 9.2–12.9)
POTASSIUM SERPL-SCNC: 4.4 MMOL/L (ref 3.5–5.1)
PTH-INTACT SERPL-MCNC: 101.8 PG/ML (ref 9–77)
RBC # BLD AUTO: 4.37 M/UL (ref 4.6–6.2)
SODIUM SERPL-SCNC: 138 MMOL/L (ref 136–145)
WBC # BLD AUTO: 7.52 K/UL (ref 3.9–12.7)

## 2022-03-09 PROCEDURE — 36415 COLL VENOUS BLD VENIPUNCTURE: CPT | Mod: PO | Performed by: NURSE PRACTITIONER

## 2022-03-09 PROCEDURE — 80069 RENAL FUNCTION PANEL: CPT | Performed by: NURSE PRACTITIONER

## 2022-03-09 PROCEDURE — 85025 COMPLETE CBC W/AUTO DIFF WBC: CPT | Performed by: NURSE PRACTITIONER

## 2022-03-09 PROCEDURE — 83970 ASSAY OF PARATHORMONE: CPT | Performed by: NURSE PRACTITIONER

## 2022-03-10 ENCOUNTER — PATIENT OUTREACH (OUTPATIENT)
Dept: ADMINISTRATIVE | Facility: OTHER | Age: 72
End: 2022-03-10
Payer: MEDICARE

## 2022-03-11 ENCOUNTER — OFFICE VISIT (OUTPATIENT)
Dept: NEPHROLOGY | Facility: CLINIC | Age: 72
End: 2022-03-11
Payer: MEDICARE

## 2022-03-11 VITALS
SYSTOLIC BLOOD PRESSURE: 130 MMHG | OXYGEN SATURATION: 98 % | HEIGHT: 73 IN | HEART RATE: 74 BPM | WEIGHT: 315 LBS | DIASTOLIC BLOOD PRESSURE: 64 MMHG | BODY MASS INDEX: 41.75 KG/M2

## 2022-03-11 DIAGNOSIS — E66.01 MORBID OBESITY WITH BMI OF 45.0-49.9, ADULT: ICD-10-CM

## 2022-03-11 DIAGNOSIS — I48.19 PERSISTENT ATRIAL FIBRILLATION: ICD-10-CM

## 2022-03-11 DIAGNOSIS — N20.0 NEPHROLITH: ICD-10-CM

## 2022-03-11 DIAGNOSIS — N18.32 STAGE 3B CHRONIC KIDNEY DISEASE: ICD-10-CM

## 2022-03-11 DIAGNOSIS — E11.9 DM TYPE 2 WITHOUT RETINOPATHY: ICD-10-CM

## 2022-03-11 DIAGNOSIS — I48.0 PAROXYSMAL ATRIAL FIBRILLATION: ICD-10-CM

## 2022-03-11 DIAGNOSIS — N18.4 STAGE 4 CHRONIC KIDNEY DISEASE: Primary | ICD-10-CM

## 2022-03-11 PROCEDURE — 99214 OFFICE O/P EST MOD 30 MIN: CPT | Mod: S$PBB,,, | Performed by: NURSE PRACTITIONER

## 2022-03-11 PROCEDURE — 99214 OFFICE O/P EST MOD 30 MIN: CPT | Mod: PBBFAC | Performed by: NURSE PRACTITIONER

## 2022-03-11 PROCEDURE — 99999 PR PBB SHADOW E&M-EST. PATIENT-LVL IV: CPT | Mod: PBBFAC,,, | Performed by: NURSE PRACTITIONER

## 2022-03-11 PROCEDURE — 99214 PR OFFICE/OUTPT VISIT, EST, LEVL IV, 30-39 MIN: ICD-10-PCS | Mod: S$PBB,,, | Performed by: NURSE PRACTITIONER

## 2022-03-11 PROCEDURE — 99999 PR PBB SHADOW E&M-EST. PATIENT-LVL IV: ICD-10-PCS | Mod: PBBFAC,,, | Performed by: NURSE PRACTITIONER

## 2022-03-11 NOTE — TELEPHONE ENCOUNTER
----- Message from Radha Ferreira sent at 3/11/2022 12:24 PM CST -----  Contact: WENDY FELDMAN [[id number]]  Type:  RX Refill Request    Who Called:  WENDY FELDMAN [[id number]]    Refill or New Rx: refill     RX Name and Strength: amiodarone (PACERONE) 200 MG Tab    How is the patient currently taking it? (ex. 1XDay): Sig - Route: Take 1 tablet (200 mg total) by mouth 2 (two) times daily. - Oral    Is this a 30 day or 90 day RX:    Preferred Pharmacy with phone number:   [facility] #[id number] - [place] [address] AT [address]  [place] [address]  Phone: [phone] Fax: [phone]      Local or Mail Order: Local    Ordering Provider: Lombard    Would the patient rather a call back or a response via [facility]? Yes    Best Call Back Number: [phone] (mobile)    Additional Information:

## 2022-03-11 NOTE — PROGRESS NOTES
Health Maintenance Due   Topic Date Due    Shingles Vaccine (1 of 2) Never done    Diabetes Urine Screening  11/25/2016    Foot Exam  06/25/2020    Eye Exam  11/25/2021    Colorectal Cancer Screening  02/26/2022    COVID-19 Vaccine (3 - Moderna risk 4-dose series) 03/04/2022     Updates were requested from care everywhere.  Chart was reviewed for overdue Proactive [facility] Encounters (ROSELINE) topics (CRS, Breast Cancer Screening, Eye exam)  Health Maintenance has been updated.  LINKS immunization registry triggered.  Immunizations were reconciled.

## 2022-03-11 NOTE — PROGRESS NOTES
Subjective:       Patient ID: Wale Calvo is a 71 y.o. white male who presents for new evaluation of   No chief complaint on file.      HPI     Patient is new to me. New to clinic.  Prior pertinent chart reviewed since this is patient's first appointment with me. Presents with spouse. Referred here by Dr. Lombard.    Patient presents for new evaluation of CKD.  Baseline creatinine of 1.6-1.8 from 2019-January 2021. sCr of 2.1 in April 2021 and 2.4 in October. No recent labs.    Seen in hospital in December 2020 and diagnosed with afib and started on eliquis.  Admitted to observation in Jan 2021 with ACS. Had plans for LHC but patient left AMA prior to procedure.   Sees cardiologist (in [place]). Discussed ablation but patient is unsure. Has 7 cardiac stents.  Had IV contrast last in September.    Has been off amiodarone while waiting for refill.  Metformin d/c about two months ago.  Takes Aleve 2-3x/month.    Significant hx includes HTN, CAD, HLD, afib, T2DM since 2013, ROSE sleeps CPAP, nephrolithiasis     The patient denies taking NSAIDs, herbal supplements, or new antibiotics, recreational drugs, recent episode of dehydration, diarrhea, nausea or vomiting, acute illness.     Significant family hx includes: no known kidney disease    Last renal US: July 2020, reviewed.    Update 3/11/22:  Presents for f/u of CKD. Last seen by me in December.  Baseline sCr 2.2-2.4 vs 1.6-1.8.  Home BPs: has not been taking recently  No longer on NSAIDs.  Potassium citrate 10 meq BID started after last visit due to 24 hr urine results.  Needed to start low K diet after that due to mild  Hyperkalemia.  Says he thinks he is back in afib right now. Says he has been off the amiodarone per self for about 3 mos and is waiting for the pharmacy to refill it.  Has lost weight 20lb. Nutrisystem. High protein, low glycemic.      Review of Systems   Constitutional: Positive for fatigue (increased with afib).   Respiratory: Negative  "for shortness of breath.    Cardiovascular: Positive for palpitations and leg swelling (by the end of the day). Negative for chest pain.   Gastrointestinal: Negative for diarrhea, nausea and vomiting.   Genitourinary: Negative for difficulty urinating, dysuria and hematuria.       Objective:       Blood pressure 130/64, pulse 74, height 6' 1" (1.854 m), weight (!) 157.6 kg (347 lb 7.1 oz), SpO2 98 %.   Physical Exam  Vitals reviewed.   Constitutional:       General: He is not in acute distress.     Appearance: He is well-developed. He is obese.   Eyes:      Conjunctiva/sclera: Conjunctivae normal.   Neck:      Vascular: No JVD.   Cardiovascular:      Rate and Rhythm: Normal rate. Rhythm irregular.   Pulmonary:      Effort: Pulmonary effort is normal.   Abdominal:      Tenderness: There is no right CVA tenderness or left CVA tenderness.   Musculoskeletal:      Cervical back: Neck supple.      Right lower leg: Edema (trace) present.      Left lower leg: Edema (trace) present.   Neurological:      Mental Status: He is alert and oriented to person, place, and time.   Psychiatric:         Mood and Affect: Mood normal.         Behavior: Behavior normal.         Thought Content: Thought content normal.         Judgment: Judgment normal.           Lab Results   Component Value Date    CREATININE 1.8 (H) 03/09/2022    URICACID -0.21 01/03/2022     Prot/Creat Ratio, Urine   Date Value Ref Range Status   03/09/2022 0.12 0.00 - 0.20 Final   12/22/2021 0.12 0.00 - 0.20 Final     Lab Results   Component Value Date     03/09/2022    K 4.4 03/09/2022    CO2 25 03/09/2022     03/09/2022     Lab Results   Component Value Date    .8 (H) 03/09/2022    CALCIUM 9.2 03/09/2022    PHOS 3.2 03/09/2022     Lab Results   Component Value Date    HGB 13.5 (L) 03/09/2022    WBC 7.52 03/09/2022    HCT 41.2 03/09/2022      Lab Results   Component Value Date    HGBA1C 5.9 (H) 10/08/2021     (L) 03/09/2022    BUN 24 (H) " "03/09/2022     Lab Results   Component Value Date    LDLCALC 80.2 04/12/2021         Assessment:       1. Stage 4 chronic kidney disease    2. Nephrolith    3. Paroxysmal atrial fibrillation    4. DM type 2 without retinopathy    5. Morbid obesity with BMI of 45.0-49.9, adult    6. Stage 3b chronic kidney disease        Plan:   CKD stage 3B/4 c eGFR 26-31 mL/min - unclear etiology. Progression possibly r/t DM, HTN, afib, atherosclerotic disease. Recent . Educated patient to control BP, BG, remain well-hydrated, and avoid NSAIDs to prevent progression of CKD. Encouraged weight loss.    Uroscopy (12/22/21): many needle-shaped crystals. Will check uric acid level.    UPCR Non-proteinuric   Acid-base WNL. On K citrate.   Renal osteodystrophy Ca, phos okay. PTH slightly high.   Anemia Hgb at goal for CKD   DM Well-controlled now, but had A1c as high as 8.6 in the past.   Lipid Management On statin.   ESRD planning Previously: Anticipatory guidance provided about timing of dialysis. Start discussions and planning when eGFR is about 20 mL/min; most patients start dialysis between 5-10 mL/min.  Patient says he does not want dialysis. His step-father was on dialysis. He said, "I know what will happen if I don't get it. I will die."       HTN - Okay today on amiodarone 200 mg BID (has not been taking), imdur 60 mg, lisinopril 5 mg, metoprolol tartrate 50 daily    Nephrolithiasis - likely CaOx. Still has stones visible on most recent CT. Believes he passed one a couple weeks ago.  - On Kcitrate 10 meq BID    Hyperkalemia - resolved on low K diet. Still on k-citrate    All questions patient had were answered.  Asked if further questions. None. F/u in clinic in 3 mos with labs and urine prior to next visit or sooner if needed.  ER for emergency concerns.    Summary of Plan:  1. Continue K citrate and low K diet  2. F/u c cardiology regarding afib; warning signs for which to report to ER reviewed  3. avoid NSAID/ bactrim/ IV " contrast/ gadolinium/ aminoglycoside where possible  4. RTC in 3 mos

## 2022-03-15 RX ORDER — AMIODARONE HYDROCHLORIDE 200 MG/1
200 TABLET ORAL 2 TIMES DAILY
Qty: 60 TABLET | Refills: 11 | Status: SHIPPED | OUTPATIENT
Start: 2022-03-15 | End: 2022-12-22 | Stop reason: SDUPTHER

## 2022-03-21 ENCOUNTER — PES CALL (OUTPATIENT)
Dept: ADMINISTRATIVE | Facility: CLINIC | Age: 72
End: 2022-03-21
Payer: MEDICARE

## 2022-03-30 NOTE — TELEPHONE ENCOUNTER
----- Message from Gretel Ortega sent at 3/30/2022  9:10 AM CDT -----  Type: RX Refill Request    Who Called: Self     Have you contacted your pharmacy: no     Refill or New Rx: Refill     RX Name and Strength:lisinopriL (PRINIVIL,ZESTRIL) 5 MG tablet    Preferred Pharmacy with phone number:[facility] #[id number] - [place], [place] - [address] [place] [address] [address] [facility] [place]   Phone:  [phone]  Fax:  [phone]          Local or Mail Order: Local     Ordering Provider: Dr. Lombard    Would the patient rather a call back or a response via My [facility]? Call     Best Call Back Number:.[phone] (home)

## 2022-03-31 RX ORDER — LISINOPRIL 5 MG/1
5 TABLET ORAL DAILY
Qty: 90 TABLET | Refills: 1 | Status: SHIPPED | OUTPATIENT
Start: 2022-03-31 | End: 2022-10-03 | Stop reason: SDUPTHER

## 2022-04-01 DIAGNOSIS — K21.9 GASTROESOPHAGEAL REFLUX DISEASE WITHOUT ESOPHAGITIS: ICD-10-CM

## 2022-04-01 NOTE — TELEPHONE ENCOUNTER
No new care gaps identified.  Powered by [email or web address] by [employer]. Reference number: [id number].   4/01/2022 6:05:04 PM CDT

## 2022-04-05 ENCOUNTER — TELEPHONE (OUTPATIENT)
Dept: FAMILY MEDICINE | Facility: CLINIC | Age: 72
End: 2022-04-05
Payer: MEDICARE

## 2022-04-05 NOTE — TELEPHONE ENCOUNTER
----- Message from Nora Weiner sent at 4/5/2022 11:36 AM CDT -----  Regarding: self  .Type: RX Refill Request    Who Called:  self   Have you contacted your pharmacy    Refill or New Rx: refill     RX Name and Strength:famotidine (PEPCID) 40 MG tablet  atorvastatin (LIPITOR) 40 MG tablet        Preferred Pharmacy with phone number .  [id number] [place] [place] [address]  Phone: [phone] Fax: [phone]        Local or Mail Order: local     Ordering Provider: Dr Lombard     Would the patient rather a call back or a response via [facility]? Call     Best Call Back Number: .[phone]

## 2022-04-05 NOTE — TELEPHONE ENCOUNTER
Advised patient that previous message was sent to provider and we are awaiting approval. DM F/U scheduled.

## 2022-04-05 NOTE — TELEPHONE ENCOUNTER
----- Message from Nora Weiner sent at 4/5/2022 11:36 AM CDT -----  Regarding: self  .Type: RX Refill Request    Who Called:  self   Have you contacted your pharmacy    Refill or New Rx: refill     RX Name and Strength:famotidine (PEPCID) 40 MG tablet  atorvastatin (LIPITOR) 40 MG tablet        Preferred Pharmacy with phone number .  [id number] [place] [address]  Phone: [phone] Fax: [phone]        Local or Mail Order: local     Ordering Provider: Dr Lombard     Would the patient rather a call back or a response via [facility]? Call     Best Call Back Number: .[phone]

## 2022-04-07 RX ORDER — FAMOTIDINE 40 MG/1
TABLET, FILM COATED ORAL
Qty: 180 TABLET | Refills: 0 | Status: SHIPPED | OUTPATIENT
Start: 2022-04-07 | End: 2022-10-25

## 2022-04-07 NOTE — TELEPHONE ENCOUNTER
Refill Routing Note   Medication(s) are not appropriate for processing by [facility] for the following reason(s):      - Required laboratory values are abnormal    [facility] action(s):  Defer          Medication reconciliation completed: No     Appointments  past 12m or future 3m with PCP    Date Provider   Last Visit   4/20/2021 Azikiwe K. Lombard, MD   Next Visit   4/4/2022 Azikiwe K. Lombard, MD   ED visits in past 90 days: 0        Note composed:4:51 PM 04/07/2022

## 2022-04-08 ENCOUNTER — NURSE TRIAGE (OUTPATIENT)
Dept: ADMINISTRATIVE | Facility: CLINIC | Age: 72
End: 2022-04-08
Payer: MEDICARE

## 2022-04-08 NOTE — TELEPHONE ENCOUNTER
Patient c/o low blood pressure reading (103/65) and chest pain lasting all day. Patient states the pain is pressure-like after taking nitroglycerin. Patient states his blood pressure reading is now 127/80. Patient states history of cardiac disease and reports that he has had 7 coronary stents placed. Patient also reports a history of atrial fibrillation.     Patient and patient's wife, Chasidy, advised to call EMS/911 at this time for transport to an ED. Patient states understanding of Care Advice    Reason for Disposition   Chest pain lasting longer than 5 minutes and ANY of the following:* Over 44 years old* Over 30 years old and at least one cardiac risk factor (e.g., diabetes mellitus, high blood pressure, high cholesterol, smoker, or strong family history of heart disease)* History of heart disease (i.e., angina, heart attack, heart failure, bypass surgery, takes nitroglycerin)* Pain is crushing, pressure-like, or heavy    Additional Information   Negative: SEVERE difficulty breathing (e.g., struggling for each breath, speaks in single words)   Negative: Passed out (i.e., fainted, collapsed and was not responding)   Negative: Difficult to awaken or acting confused (e.g., disoriented, slurred speech)   Negative: Shock suspected (e.g., cold/pale/clammy skin, too weak to stand, low BP, rapid pulse)    Protocols used: CHEST PAIN-A-OH

## 2022-04-13 ENCOUNTER — HOSPITAL ENCOUNTER (EMERGENCY)
Facility: HOSPITAL | Age: 72
Discharge: HOME | End: 2022-04-13
Attending: EMERGENCY MEDICINE
Payer: MEDICARE

## 2022-04-13 VITALS
SYSTOLIC BLOOD PRESSURE: 138 MMHG | DIASTOLIC BLOOD PRESSURE: 70 MMHG | TEMPERATURE: 98 F | BODY MASS INDEX: 41.75 KG/M2 | OXYGEN SATURATION: 98 % | WEIGHT: 315 LBS | HEART RATE: 82 BPM | HEIGHT: 73 IN | RESPIRATION RATE: 18 BRPM

## 2022-04-13 DIAGNOSIS — S51.812A SKIN TEAR OF LEFT FOREARM WITHOUT COMPLICATION, INITIAL ENCOUNTER: ICD-10-CM

## 2022-04-13 DIAGNOSIS — S59.919A FOREARM INJURY: ICD-10-CM

## 2022-04-13 DIAGNOSIS — S69.90XA WRIST INJURY: ICD-10-CM

## 2022-04-13 DIAGNOSIS — S60.221A CONTUSION OF RIGHT HAND, INITIAL ENCOUNTER: Primary | ICD-10-CM

## 2022-04-13 PROCEDURE — 25000003 PHARM REV CODE 250: Performed by: PHYSICIAN ASSISTANT

## 2022-04-13 PROCEDURE — 99284 EMERGENCY DEPT VISIT MOD MDM: CPT | Mod: 25

## 2022-04-13 RX ORDER — HYDROCODONE BITARTRATE AND ACETAMINOPHEN 5; 325 MG/1; MG/1
1 TABLET ORAL EVERY 6 HOURS PRN
Qty: 6 TABLET | Refills: 0 | Status: SHIPPED | OUTPATIENT
Start: 2022-04-13 | End: 2022-07-12

## 2022-04-13 RX ORDER — HYDROCODONE BITARTRATE AND ACETAMINOPHEN 5; 325 MG/1; MG/1
1 TABLET ORAL
Status: COMPLETED | OUTPATIENT
Start: 2022-04-13 | End: 2022-04-13

## 2022-04-13 RX ADMIN — HYDROCODONE BITARTRATE AND ACETAMINOPHEN 1 TABLET: 5; 325 TABLET ORAL at 08:04

## 2022-04-14 NOTE — ED PROVIDER NOTES
Encounter Date: 4/13/2022       History     Chief Complaint   Patient presents with    Hand Injury     Presents to the ED with pain and swelling right hand, states window dropped onto hand     Chief Complaint: Hand injury  History of  Present Illness: History obtained from patient. This 71 y.o. male who has past medical history of CAD, diabetes, GERD, hyperlipidemia, hypertension, kidney stones, anticoagulant use secondary to atrial fibrillation presents to the ED complaining of right hand and left forearm pain after a window fell onto his arms prior to arrival.  Patient is right-hand dominant.  Denies numbness, tingling, weakness.  Patient applied ice with improvement of pain.          Review of patient's allergies indicates:   Allergen Reactions    Penicillins Anaphylaxis     Past Medical History:   Diagnosis Date    Anticoagulant long-term use     Eliquis    Colonic polyp 07/25/2017    Coronary artery disease     CPAP (continuous positive airway pressure) dependence     Diabetes mellitus type II     Diabetes with neurologic complications     ED (erectile dysfunction)     GERD (gastroesophageal reflux disease)     Hyperlipidemia     Hypertension     Kidney stones     Kidney stones     Morbidly obese     Paroxysmal A-fib     Renal manifestation of secondary diabetes mellitus     Sleep apnea      Past Surgical History:   Procedure Laterality Date    CATARACT EXTRACTION W/  INTRAOCULAR LENS IMPLANT Left 08/16/2016    Dr. Martinez    CATARACT EXTRACTION W/  INTRAOCULAR LENS IMPLANT Right 08/30/2016    Dr. Martinez    CORONARY STENT PLACEMENT      JOINT REPLACEMENT      both knees    SPINE SURGERY      discectomy    TONSILLECTOMY       Family History   Problem Relation Age of Onset    Dementia Mother     Cataracts Mother     Heart disease Father     Alcohol abuse Brother     Drug abuse Daughter     No Known Problems Son     No Known Problems Daughter     No Known Problems Son     No  Known Problems Sister     No Known Problems Maternal Aunt     No Known Problems Maternal Uncle     No Known Problems Paternal Aunt     No Known Problems Paternal Uncle     No Known Problems Maternal Grandmother     No Known Problems Maternal Grandfather     No Known Problems Paternal Grandmother     No Known Problems Paternal Grandfather     Amblyopia Neg Hx     Blindness Neg Hx     Cancer Neg Hx     Diabetes Neg Hx     Glaucoma Neg Hx     Hypertension Neg Hx     Macular degeneration Neg Hx     Retinal detachment Neg Hx     Strabismus Neg Hx     Stroke Neg Hx     Thyroid disease Neg Hx      Social History     Tobacco Use    Smoking status: Never Smoker    Smokeless tobacco: Never Used   Substance Use Topics    Alcohol use: No    Drug use: No     Review of Systems   Constitutional: Negative for chills and fever.   HENT: Negative for congestion, rhinorrhea and sore throat.    Eyes: Negative for visual disturbance.   Respiratory: Negative for cough and shortness of breath.    Cardiovascular: Negative for chest pain.   Gastrointestinal: Negative for abdominal pain, diarrhea, nausea and vomiting.   Genitourinary: Negative for dysuria, frequency and hematuria.   Musculoskeletal: Negative for back pain.        (+) right hand pain  (+) left forearm pain   Skin: Negative for rash.   Neurological: Negative for dizziness, weakness and headaches.       Physical Exam     Initial Vitals [04/13/22 2009]   BP Pulse Resp Temp SpO2   138/70 82 16 98.3 °F (36.8 °C) 98 %      MAP       --         Physical Exam    Constitutional: He appears well-developed and well-nourished. He is active.  Non-toxic appearance. He does not have a sickly appearance. He does not appear ill.   HENT:   Head: Normocephalic and atraumatic.   Nose: Nose normal.   Mouth/Throat: Uvula is midline, oropharynx is clear and moist and mucous membranes are normal.   Eyes: Conjunctivae, EOM and lids are normal. Pupils are equal, round, and  reactive to light.   Neck: Neck supple.   Normal range of motion.   Full passive range of motion without pain.     Cardiovascular: Normal rate and regular rhythm.   Musculoskeletal:      Cervical back: Full passive range of motion without pain, normal range of motion and neck supple.      Comments: There is tenderness palpation to the base of the right first metacarpal.  No snuffbox tenderness.  No pain with axial loading.  No tenderness to the remainder of the metacarpals.  There is mild tenderness palpation to the left forearm with overlying skin tears.  No obvious deformities.  There is full range of motion against resistance.  All compartments are soft.     Neurological: He is alert and oriented to person, place, and time.   Skin: Skin is warm. Capillary refill takes less than 2 seconds.         ED Course   Procedures  Labs Reviewed - No data to display       Imaging Results          X-Ray Hand 3 View Right (Final result)  Result time 04/13/22 20:48:01    Final result by Jeffery Jacob MD (04/13/22 20:48:01)                 Impression:      Degenerative changes throughout the right wrist and hand with no evidence of fracture or foreign body.      Electronically signed by: Jeffery Jacob  Date:    04/13/2022  Time:    20:48             Narrative:    EXAMINATION:  XR HAND COMPLETE 3 VIEW RIGHT; XR WRIST COMPLETE 3 VIEWS RIGHT    CLINICAL HISTORY:  hand injury; Unspecified injury of unspecified wrist, hand and finger(s), initial encounter    TECHNIQUE:  PA, lateral, and oblique views of the wrist right hand were performed.    COMPARISON:  None    FINDINGS:  Bones, joint spaces and soft tissues appear intact.  Mild osteoarthritic changes are noted throughout along with atherosclerotic vascular calcifications in the radial and ulnar arteries.    Carpal bones appear intact with osteoarthritis at the trapezium base of the thumb.                               X-Ray Wrist Complete Right (Final result)  Result time  04/13/22 20:48:01    Final result by Jeffery Jacob MD (04/13/22 20:48:01)                 Impression:      Degenerative changes throughout the right wrist and hand with no evidence of fracture or foreign body.      Electronically signed by: Jeffery Jacob  Date:    04/13/2022  Time:    20:48             Narrative:    EXAMINATION:  XR HAND COMPLETE 3 VIEW RIGHT; XR WRIST COMPLETE 3 VIEWS RIGHT    CLINICAL HISTORY:  hand injury; Unspecified injury of unspecified wrist, hand and finger(s), initial encounter    TECHNIQUE:  PA, lateral, and oblique views of the wrist right hand were performed.    COMPARISON:  None    FINDINGS:  Bones, joint spaces and soft tissues appear intact.  Mild osteoarthritic changes are noted throughout along with atherosclerotic vascular calcifications in the radial and ulnar arteries.    Carpal bones appear intact with osteoarthritis at the trapezium base of the thumb.                               X-Ray Forearm Left (Final result)  Result time 04/13/22 20:48:48    Final result by Jeffery Jacob MD (04/13/22 20:48:48)                 Impression:      No acute findings evident in the left forearm.      Electronically signed by: Jeffery Jacob  Date:    04/13/2022  Time:    20:48             Narrative:    EXAMINATION:  XR FOREARM LEFT    CLINICAL HISTORY:  Unspecified injury of unspecified forearm, initial encounter    TECHNIQUE:  AP and lateral views of the left forearm were performed.    COMPARISON:  None    FINDINGS:  Osteoarthritis is noted within the elbow.  The radius and ulna are intact.  Degenerative changes in the left wrist are noted.  Ace bandage projects over the distal forearm with no evidence of fracture or foreign body.  Vascular calcifications are noted.                                 Medications   HYDROcodone-acetaminophen 5-325 mg per tablet 1 tablet (1 tablet Oral Given 4/13/22 2036)     Medical Decision Making:   Differential Diagnosis:   Fracture, dislocation,  sprain, strain, contusion  ED Management:  This is an evaluation of a 71 y.o. male who presents to the ED for arm injury.  Vital signs are stable.   Afebrile.  Patient is nontoxic appearing and in no acute distress.     Neurovascularly intact.  HPI and physical exam as above.  X-ray of the right hand and right wrist showed degenerative changes without acute fracture or dislocation.  X-ray of the left forearm shows acute fracture.  Etiology likely secondary to contusion.  Will discharge patient home symptomatic care.  Encouraged RICE therapy.    Patient given return precautions and instructed to return to the emergency department for any new or worsening symptoms. Patient verbalized understanding and agreed with plan. Encouraged follow-up with PCP.                        Clinical Impression:   Final diagnoses:  [S69.90XA] Wrist injury  [S59.919A] Forearm injury  [S60.221A] Contusion of right hand, initial encounter (Primary)  [S51.812A] Skin tear of left forearm without complication, initial encounter          ED Disposition Condition    Discharge Stable        ED Prescriptions     Medication Sig Dispense Start Date End Date Auth. Provider    HYDROcodone-acetaminophen (NORCO) 5-325 mg per tablet Take 1 tablet by mouth every 6 (six) hours as needed. 6 tablet 4/13/2022  Lm Espinal PA-C        Follow-up Information     Follow up With Specialties Details Why Contact Info    Azikiwe K. Lombard, MD Family Medicine   [address] [place] [address]  [phone]      [facility] Emergency Dept Emergency Medicine Go in 1 day If symptoms worsen [address]  [facility] [address] [phone]           Lm Espinal PA-C  04/13/22 3947

## 2022-05-27 ENCOUNTER — PES CALL (OUTPATIENT)
Dept: ADMINISTRATIVE | Facility: CLINIC | Age: 72
End: 2022-05-27
Payer: MEDICARE

## 2022-06-03 ENCOUNTER — LAB VISIT (OUTPATIENT)
Dept: LAB | Facility: HOSPITAL | Age: 72
End: 2022-06-03
Attending: NURSE PRACTITIONER
Payer: MEDICARE

## 2022-06-03 DIAGNOSIS — N18.4 STAGE 4 CHRONIC KIDNEY DISEASE: ICD-10-CM

## 2022-06-03 LAB
ALBUMIN SERPL BCP-MCNC: 4 G/DL (ref 3.5–5.2)
ANION GAP SERPL CALC-SCNC: 8 MMOL/L (ref 8–16)
BASOPHILS # BLD AUTO: 0.08 K/UL (ref 0–0.2)
BASOPHILS NFR BLD: 1.2 % (ref 0–1.9)
BUN SERPL-MCNC: 30 MG/DL (ref 8–23)
CALCIUM SERPL-MCNC: 9.1 MG/DL (ref 8.7–10.5)
CHLORIDE SERPL-SCNC: 108 MMOL/L (ref 95–110)
CO2 SERPL-SCNC: 23 MMOL/L (ref 23–29)
CREAT SERPL-MCNC: 1.9 MG/DL (ref 0.5–1.4)
DIFFERENTIAL METHOD: ABNORMAL
EOSINOPHIL # BLD AUTO: 0.2 K/UL (ref 0–0.5)
EOSINOPHIL NFR BLD: 2.7 % (ref 0–8)
ERYTHROCYTE [DISTWIDTH] IN BLOOD BY AUTOMATED COUNT: 13.1 % (ref 11.5–14.5)
EST. GFR  (AFRICAN AMERICAN): 40.4 ML/MIN/1.73 M^2
EST. GFR  (NON AFRICAN AMERICAN): 34.9 ML/MIN/1.73 M^2
GLUCOSE SERPL-MCNC: 112 MG/DL (ref 70–110)
HCT VFR BLD AUTO: 42.6 % (ref 40–54)
HGB BLD-MCNC: 13.6 G/DL (ref 14–18)
IMM GRANULOCYTES # BLD AUTO: 0.02 K/UL (ref 0–0.04)
IMM GRANULOCYTES NFR BLD AUTO: 0.3 % (ref 0–0.5)
LYMPHOCYTES # BLD AUTO: 1.1 K/UL (ref 1–4.8)
LYMPHOCYTES NFR BLD: 16.6 % (ref 18–48)
MCH RBC QN AUTO: 29.6 PG (ref 27–31)
MCHC RBC AUTO-ENTMCNC: 31.9 G/DL (ref 32–36)
MCV RBC AUTO: 93 FL (ref 82–98)
MONOCYTES # BLD AUTO: 0.6 K/UL (ref 0.3–1)
MONOCYTES NFR BLD: 9 % (ref 4–15)
NEUTROPHILS # BLD AUTO: 4.7 K/UL (ref 1.8–7.7)
NEUTROPHILS NFR BLD: 70.2 % (ref 38–73)
NRBC BLD-RTO: 0 /100 WBC
PHOSPHATE SERPL-MCNC: 3.7 MG/DL (ref 2.7–4.5)
PLATELET # BLD AUTO: 130 K/UL (ref 150–450)
PMV BLD AUTO: 12 FL (ref 9.2–12.9)
POTASSIUM SERPL-SCNC: 4.7 MMOL/L (ref 3.5–5.1)
PTH-INTACT SERPL-MCNC: 131.5 PG/ML (ref 9–77)
RBC # BLD AUTO: 4.59 M/UL (ref 4.6–6.2)
SODIUM SERPL-SCNC: 139 MMOL/L (ref 136–145)
WBC # BLD AUTO: 6.7 K/UL (ref 3.9–12.7)

## 2022-06-03 PROCEDURE — 83970 ASSAY OF PARATHORMONE: CPT | Performed by: NURSE PRACTITIONER

## 2022-06-03 PROCEDURE — 80069 RENAL FUNCTION PANEL: CPT | Performed by: NURSE PRACTITIONER

## 2022-06-03 PROCEDURE — 85025 COMPLETE CBC W/AUTO DIFF WBC: CPT | Performed by: NURSE PRACTITIONER

## 2022-06-03 PROCEDURE — 36415 COLL VENOUS BLD VENIPUNCTURE: CPT | Mod: PO | Performed by: NURSE PRACTITIONER

## 2022-06-18 ENCOUNTER — PES CALL (OUTPATIENT)
Dept: ADMINISTRATIVE | Facility: CLINIC | Age: 72
End: 2022-06-18
Payer: MEDICARE

## 2022-06-20 ENCOUNTER — PES CALL (OUTPATIENT)
Dept: ADMINISTRATIVE | Facility: CLINIC | Age: 72
End: 2022-06-20
Payer: MEDICARE

## 2022-07-08 DIAGNOSIS — E78.00 PURE HYPERCHOLESTEROLEMIA: ICD-10-CM

## 2022-07-08 NOTE — TELEPHONE ENCOUNTER
Last Office Visit Info:   The patient's last visit with Azikiwe K Lombard, MD was on 4/20/2021.    The patient's last visit in current department was on Visit date not found.        Last CBC Results:   Lab Results   Component Value Date    WBC 6.70 06/03/2022    HGB 13.6 (L) 06/03/2022    HCT 42.6 06/03/2022     (L) 06/03/2022       Last CMP Results  Lab Results   Component Value Date     06/03/2022    K 4.7 06/03/2022     06/03/2022    CO2 23 06/03/2022    BUN 30 (H) 06/03/2022    CREATININE 1.9 (H) 06/03/2022    CALCIUM 9.1 06/03/2022    ALBUMIN 4.0 06/03/2022    AST 15 10/08/2021    ALT 19 10/08/2021       Last Lipids  Lab Results   Component Value Date    CHOL 133 04/12/2021    TRIG 99 04/12/2021    HDL 33 (L) 04/12/2021    LDLCALC 80.2 04/12/2021       Last A1C  Lab Results   Component Value Date    HGBA1C 5.9 (H) 10/08/2021       Last TSH  No results found for: TSH          Current Med Refills  Medication List with Changes/Refills   Current Medications    AMIODARONE (PACERONE) 200 MG TAB    Take 1 tablet (200 mg total) by mouth 2 (two) times daily.       Start Date: 3/15/2022 End Date: --    ATORVASTATIN (LIPITOR) 40 MG TABLET    TAKE 1 TABLET(40 MG) BY MOUTH EVERY DAY       Start Date: 4/6/2022  End Date: --    BLOOD SUGAR DIAGNOSTIC (CONTOUR NEXT TEST STRIPS) STRP    TEST TWICE DAILY       Start Date: 1/3/2022  End Date: --    BLOOD-GLUCOSE METER KIT    To check BG 2 times daily, to use with insurance preferred meter       Start Date: 12/15/2019End Date: 10/8/2023    CLOPIDOGREL (PLAVIX) 75 MG TABLET    Take 75 mg by mouth once daily.       Start Date: 2/3/2021  End Date: --    ELIQUIS 5 MG TAB    Take 5 mg by mouth once daily.       Start Date: 12/22/2020End Date: --    FAMOTIDINE (PEPCID) 40 MG TABLET    TAKE 1 TABLET(40 MG) BY MOUTH TWICE DAILY       Start Date: 4/7/2022  End Date: --    GLIMEPIRIDE (AMARYL) 4 MG TABLET    TAKE 1 TABLET BY MOUTH TWICE DAILY BEFORE A MEAL       Start  Date: 10/1/2021 End Date: --    HYDROCODONE-ACETAMINOPHEN (NORCO) 5-325 MG PER TABLET    Take 1 tablet by mouth every 6 (six) hours as needed.       Start Date: 4/13/2022 End Date: --    HYDROCORTISONE 2.5 % CREAM    SHAINA EXT AA BID       Start Date: 9/24/2020 End Date: --    ISOSORBIDE MONONITRATE (IMDUR) 60 MG 24 HR TABLET    Take 60 mg by mouth once daily.       Start Date: 2/22/2021 End Date: --    LANCETS (SAFETY LANCETS) 21 GAUGE MISC    1 lancet by Misc.(Non-Drug; Combo Route) route 2 (two) times daily.       Start Date: 11/7/2019 End Date: --    LANCETS MISC    To check BG 2 times daily, to use with insurance preferred meter       Start Date: 12/15/2019End Date: --    LISINOPRIL (PRINIVIL,ZESTRIL) 5 MG TABLET    Take 1 tablet (5 mg total) by mouth once daily.       Start Date: 3/31/2022 End Date: --    METOPROLOL TARTRATE (LOPRESSOR) 50 MG TABLET    TAKE 1 TABLET(50 MG) BY MOUTH EVERY DAY       Start Date: 4/11/2022 End Date: --    NITROGLYCERIN (NITROSTAT) 0.4 MG SL TABLET    Place 0.4 mg under the tongue 4 (four) times daily as needed.       Start Date: 4/6/2021  End Date: --    POTASSIUM CITRATE (UROCIT-K) 5 MEQ (540 MG) TBSR    Take 2 tablets (10 mEq total) by mouth once daily.       Start Date: 1/12/2022 End Date: 1/12/2023       Order(s) placed per written order guidelines: none    Please advise.

## 2022-07-08 NOTE — TELEPHONE ENCOUNTER
No new care gaps identified.  [facility] Embedded Care Gaps. Reference number: [id number]. 7/08/2022   12:07:53 PM CDT

## 2022-07-08 NOTE — TELEPHONE ENCOUNTER
----- Message from Gypsy Hayes sent at 7/8/2022 12:09 PM CDT -----  Contact: Patient [phone]  Type: RX Refill Request    Who Called: Patient     Have you contacted your pharmacy: No    Refill or New Rx: Refill     RX Name and Strength: atorvastatin (LIPITOR) 40 MG tablet    Is this a 30 day or 90 day RX: 90 day    Preferred Pharmacy with phone number: .  [facility] #[id number] - [address] AT [address]  [place] [address]  Phone: [phone] Fax: [phone]    Local or Mail Order: Local    Would the patient rather a call back or a response via [facility]? Call back    Best Call Back Number: [phone]

## 2022-07-11 ENCOUNTER — TELEPHONE (OUTPATIENT)
Dept: ADMINISTRATIVE | Facility: CLINIC | Age: 72
End: 2022-07-11
Payer: MEDICARE

## 2022-07-11 ENCOUNTER — TELEPHONE (OUTPATIENT)
Dept: FAMILY MEDICINE | Facility: CLINIC | Age: 72
End: 2022-07-11
Payer: MEDICARE

## 2022-07-11 RX ORDER — ATORVASTATIN CALCIUM 40 MG/1
TABLET, FILM COATED ORAL
Qty: 90 TABLET | Refills: 3 | Status: SHIPPED | OUTPATIENT
Start: 2022-07-11 | End: 2022-12-22 | Stop reason: SDUPTHER

## 2022-07-11 NOTE — TELEPHONE ENCOUNTER
----- Message from Joi Toledo sent at 7/11/2022 12:09 PM CDT -----  Regarding: self [phone]  Type: Patient Call Back    Who called: self    What is the request in detail: patient received a call from the pharmacy stating provider declined atorvastatin (LIPITOR) 40 MG tablet    Can the clinic reply by [facility]? no    Would the patient rather a call back or a response via [facility]?  Call back    Best call back number: [phone]      [facility] #[id number] - [place], [place] - [address] [place] [address] [address] AT [address]  [place] [address]  Phone: [phone] Fax: [phone]      Please call patient back to confirm provider will send medication.

## 2022-07-11 NOTE — TELEPHONE ENCOUNTER
Called pt, informed pt I was calling to remind pt of his in office EAWV on 7/12/22; clinic location provided to patient; pt confirmed appointment

## 2022-07-12 ENCOUNTER — OFFICE VISIT (OUTPATIENT)
Dept: FAMILY MEDICINE | Facility: CLINIC | Age: 72
End: 2022-07-12
Payer: MEDICARE

## 2022-07-12 VITALS
OXYGEN SATURATION: 96 % | HEART RATE: 60 BPM | TEMPERATURE: 98 F | WEIGHT: 315 LBS | RESPIRATION RATE: 18 BRPM | BODY MASS INDEX: 41.75 KG/M2 | HEIGHT: 73 IN | SYSTOLIC BLOOD PRESSURE: 110 MMHG | DIASTOLIC BLOOD PRESSURE: 78 MMHG

## 2022-07-12 DIAGNOSIS — E66.01 MORBID OBESITY WITH BMI OF 45.0-49.9, ADULT: ICD-10-CM

## 2022-07-12 DIAGNOSIS — B96.89 ACUTE BACTERIAL SINUSITIS: ICD-10-CM

## 2022-07-12 DIAGNOSIS — E11.22 TYPE 2 DIABETES MELLITUS WITH STAGE 3B CHRONIC KIDNEY DISEASE, WITHOUT LONG-TERM CURRENT USE OF INSULIN: ICD-10-CM

## 2022-07-12 DIAGNOSIS — J01.90 ACUTE BACTERIAL SINUSITIS: ICD-10-CM

## 2022-07-12 DIAGNOSIS — Z00.00 ENCOUNTER FOR PREVENTIVE HEALTH EXAMINATION: Primary | ICD-10-CM

## 2022-07-12 DIAGNOSIS — I25.10 CORONARY ARTERY DISEASE INVOLVING NATIVE CORONARY ARTERY OF NATIVE HEART WITHOUT ANGINA PECTORIS: ICD-10-CM

## 2022-07-12 DIAGNOSIS — N18.32 TYPE 2 DIABETES MELLITUS WITH STAGE 3B CHRONIC KIDNEY DISEASE, WITHOUT LONG-TERM CURRENT USE OF INSULIN: ICD-10-CM

## 2022-07-12 DIAGNOSIS — I70.0 AORTIC ATHEROSCLEROSIS: ICD-10-CM

## 2022-07-12 DIAGNOSIS — I48.0 PAROXYSMAL ATRIAL FIBRILLATION: ICD-10-CM

## 2022-07-12 PROCEDURE — G0439 PPPS, SUBSEQ VISIT: HCPCS | Mod: ,,, | Performed by: PHYSICIAN ASSISTANT

## 2022-07-12 PROCEDURE — 99999 PR PBB SHADOW E&M-EST. PATIENT-LVL V: CPT | Mod: PBBFAC,,, | Performed by: PHYSICIAN ASSISTANT

## 2022-07-12 PROCEDURE — 99999 PR PBB SHADOW E&M-EST. PATIENT-LVL V: ICD-10-PCS | Mod: PBBFAC,,, | Performed by: PHYSICIAN ASSISTANT

## 2022-07-12 PROCEDURE — G0439 PR MEDICARE ANNUAL WELLNESS SUBSEQUENT VISIT: ICD-10-PCS | Mod: ,,, | Performed by: PHYSICIAN ASSISTANT

## 2022-07-12 PROCEDURE — 99215 OFFICE O/P EST HI 40 MIN: CPT | Mod: PBBFAC,PO | Performed by: PHYSICIAN ASSISTANT

## 2022-07-12 RX ORDER — METOPROLOL TARTRATE 25 MG/1
12.5 TABLET, FILM COATED ORAL 2 TIMES DAILY
COMMUNITY
Start: 2022-05-17 | End: 2022-12-22

## 2022-07-12 RX ORDER — AZITHROMYCIN 250 MG/1
TABLET, FILM COATED ORAL
Qty: 6 TABLET | Refills: 0 | Status: SHIPPED | OUTPATIENT
Start: 2022-07-12 | End: 2022-12-22

## 2022-07-12 RX ORDER — METHYLPREDNISOLONE 4 MG/1
TABLET ORAL
Qty: 1 EACH | Refills: 0 | Status: SHIPPED | OUTPATIENT
Start: 2022-07-12 | End: 2022-12-22

## 2022-07-12 NOTE — PROGRESS NOTES
"  Wale Calvo presented for a  Medicare AWV and comprehensive Health Risk Assessment today. The following components were reviewed and updated:    · Medical history  · Family History  · Social history  · Allergies and Current Medications  · Health Risk Assessment  · Health Maintenance  · Care Team         ** See Completed Assessments for Annual Wellness Visit within the encounter summary.**         The following assessments were completed:  · Living Situation  · CAGE  · Depression Screening  · Timed Get Up and Go  · Whisper Test  · Cognitive Function Screening  · Nutrition Screening  · ADL Screening  · PAQ Screening        Vitals:    07/12/22 1004   BP: 110/78   BP Location: Left arm   Patient Position: Sitting   BP Method: Large (Manual)   Pulse: 60   Resp: 18   Temp: 97.8 °F (36.6 °C)   TempSrc: Oral   SpO2: 96%   Weight: (!) 155.7 kg (343 lb 4.1 oz)   Height: 6' 1" (1.854 m)     Body mass index is 45.29 kg/m².  Physical Exam          Diagnoses and health risks identified today and associated recommendations/orders:    1. Encounter for preventive health examination  Provided Wale with a 5-10 year written screening schedule and personal prevention plan. Recommendations were developed using the USPSTF age appropriate recommendations. Education, counseling, and referrals were provided as needed. After Visit Summary printed and given to patient which includes a list of additional screenings\tests needed.    2. Type 2 diabetes mellitus with stage 3b chronic kidney disease, without long-term current use of insulin  - Hemoglobin A1c; Future  - Lipid panel; Future  stable    3. Aortic atherosclerosis  Continue meds    4. Paroxysmal atrial fibrillation  Followed by cards in [place]    5. Coronary artery disease involving native coronary artery of native heart without angina pectoris  Followed by cards    6. Morbid obesity with BMI of 45.0-49.9, adult  Diet and exercise    7. Acute bacterial sinusitis  - " azithromycin (Z-CRISTO) 250 MG tablet; Take 2 pills day 1, then 1 pill day 2-5  Dispense: 6 tablet; Refill: 0  - methylPREDNISolone (MEDROL DOSEPACK) 4 mg tablet; use as directed  Dispense: 1 each; Refill: 0      No follow-ups on file.    Uzma Torres PA-C  I offered to discuss advanced care planning, including how to pick a person who would make decisions for you if you were unable to make them for yourself, called a health care power of , and what kind of decisions you might make such as use of life sustaining treatments such as ventilators and tube feeding when faced with a life limiting illness recorded on a living will that they will need to know. (How you want to be cared for as you near the end of your natural life)     X Patient is interested in learning more about how to make advanced directives.  I provided them paperwork and offered to discuss this with them.

## 2022-07-12 NOTE — PROGRESS NOTES
Health Maintenance Due   Topic     Shingles Vaccine (1 of 2)     Diabetes Urine Screening      Foot Exam      Eye Exam      Colorectal Cancer Screening      COVID-19 Vaccine (3 - Moderna risk series)     Hemoglobin A1c      Lipid Panel

## 2022-07-12 NOTE — PATIENT INSTRUCTIONS
Counseling and Referral of Other Preventative  (Italic type indicates deductible and co-insurance are waived)    Patient Name: Wale Calvo  Today's Date: 7/12/2022    Health Maintenance       Date Due Completion Date    Shingles Vaccine (1 of 2) Never done ---    Diabetes Urine Screening 11/25/2016 11/25/2015    Foot Exam 06/25/2020 6/25/2019    Eye Exam 11/25/2021 11/25/2020    COVID-19 Vaccine (3 - Moderna risk series) 03/04/2022 2/4/2022    Hemoglobin A1c 04/08/2022 10/8/2021    Override on 11/11/2014: Done (future)    Lipid Panel 04/12/2022 4/12/2021    Influenza Vaccine (1) 09/01/2022 10/8/2021    Colorectal Cancer Screening 02/26/2024 2/26/2019    TETANUS VACCINE 08/24/2028 8/24/2018        Orders Placed This Encounter   Procedures    Hemoglobin A1c    Lipid panel     The following information is provided to all patients.  This information is to help you find resources for any of the problems found today that may be affecting your health:                Living healthy guide: www.[facility].[place].gov      Understanding Diabetes: www.diabetes.org      Eating healthy: www.cdc.gov/healthyweight      CDC home safety checklist: www.cdc.gov/steadi/patient.html      Agency on Aging: www.goea.[place].[facility]      Alcoholics anonymous (AA): www.aa.org      Physical Activity: www.sunshine.nih.gov/ry1hscg      Tobacco use: www.quit[place]us[place].org

## 2022-07-15 ENCOUNTER — PATIENT OUTREACH (OUTPATIENT)
Dept: ADMINISTRATIVE | Facility: HOSPITAL | Age: 72
End: 2022-07-15
Payer: MEDICARE

## 2022-07-19 ENCOUNTER — LAB VISIT (OUTPATIENT)
Dept: LAB | Facility: HOSPITAL | Age: 72
End: 2022-07-19
Attending: PHYSICIAN ASSISTANT
Payer: MEDICARE

## 2022-07-19 DIAGNOSIS — N18.32 TYPE 2 DIABETES MELLITUS WITH STAGE 3B CHRONIC KIDNEY DISEASE, WITHOUT LONG-TERM CURRENT USE OF INSULIN: ICD-10-CM

## 2022-07-19 DIAGNOSIS — E11.22 TYPE 2 DIABETES MELLITUS WITH STAGE 3B CHRONIC KIDNEY DISEASE, WITHOUT LONG-TERM CURRENT USE OF INSULIN: ICD-10-CM

## 2022-07-19 LAB
CHOLEST SERPL-MCNC: 139 MG/DL (ref 120–199)
CHOLEST/HDLC SERPL: 3.3 {RATIO} (ref 2–5)
ESTIMATED AVG GLUCOSE: 128 MG/DL (ref 68–131)
HBA1C MFR BLD: 6.1 % (ref 4–5.6)
HDLC SERPL-MCNC: 42 MG/DL (ref 40–75)
HDLC SERPL: 30.2 % (ref 20–50)
LDLC SERPL CALC-MCNC: 77 MG/DL (ref 63–159)
NONHDLC SERPL-MCNC: 97 MG/DL
TRIGL SERPL-MCNC: 100 MG/DL (ref 30–150)

## 2022-07-19 PROCEDURE — 83036 HEMOGLOBIN GLYCOSYLATED A1C: CPT | Performed by: PHYSICIAN ASSISTANT

## 2022-07-19 PROCEDURE — 80061 LIPID PANEL: CPT | Performed by: PHYSICIAN ASSISTANT

## 2022-07-19 PROCEDURE — 36415 COLL VENOUS BLD VENIPUNCTURE: CPT | Mod: PO | Performed by: PHYSICIAN ASSISTANT

## 2022-09-08 DIAGNOSIS — I25.10 CORONARY ARTERY DISEASE INVOLVING NATIVE CORONARY ARTERY OF NATIVE HEART WITHOUT ANGINA PECTORIS: Primary | ICD-10-CM

## 2022-09-08 NOTE — TELEPHONE ENCOUNTER
----- Message from Navdeep Landa MA sent at 9/8/2022  9:12 AM CDT -----  Type: RX Refill Request    Who Called: Self    Have you contacted your pharmacy:no    Refill or New Rx:refill    RX Name and Strength:clopidogreL (PLAVIX) 75 mg tablet      Preferred Pharmacy with phone number:[facility] #[id number] - [place], [place] - [address] [facility] [address] [facility] [address] AT [facility]   Phone:  [phone]  Fax:  [phone]          Local or Mail Order:local    Ordering Provider: Lado- Dr. Lombard former pt.    Would the patient rather a call back or a response via [facility]?     Best Call Back Number: [phone] ([place])

## 2022-09-09 RX ORDER — CLOPIDOGREL BISULFATE 75 MG/1
75 TABLET ORAL DAILY
Qty: 90 TABLET | Refills: 1 | Status: SHIPPED | OUTPATIENT
Start: 2022-09-09 | End: 2022-12-22 | Stop reason: SDUPTHER

## 2022-10-03 DIAGNOSIS — I10 ESSENTIAL HYPERTENSION: Primary | ICD-10-CM

## 2022-10-03 NOTE — TELEPHONE ENCOUNTER
----- Message from Jennifer Guadalupe sent at 10/3/2022  8:52 AM CDT -----  .Type: RX Refill Request    Who Called: self    Have you contacted your pharmacy:no    Refill or New Rx:refill    RX Name and Strength:lisinopriL (PRINIVIL,ZESTRIL) 5 MG tablet    Preferred Pharmacy with phone number:.  [facility] #[id number] - [address] AT [address]  [place] [address]  Phone: [phone] Fax: [phone]    Local or Mail Order:local    Would the patient rather a call back or a response via [facility]? call    Best Call Back Number:.[phone] (home)

## 2022-10-04 RX ORDER — LISINOPRIL 5 MG/1
5 TABLET ORAL DAILY
Qty: 90 TABLET | Refills: 0 | Status: SHIPPED | OUTPATIENT
Start: 2022-10-04 | End: 2022-12-22 | Stop reason: SDUPTHER

## 2022-10-24 DIAGNOSIS — K21.9 GASTROESOPHAGEAL REFLUX DISEASE WITHOUT ESOPHAGITIS: ICD-10-CM

## 2022-10-25 RX ORDER — FAMOTIDINE 40 MG/1
40 TABLET, FILM COATED ORAL 2 TIMES DAILY PRN
Qty: 180 TABLET | Refills: 0 | Status: SHIPPED | OUTPATIENT
Start: 2022-10-25 | End: 2022-12-22 | Stop reason: SDUPTHER

## 2022-10-25 NOTE — TELEPHONE ENCOUNTER
Pepcid sent. Please inform pt if he needs this daily ok to take, but best to take as needed for reflux to not worsen his kidney function

## 2022-10-25 NOTE — TELEPHONE ENCOUNTER
Refill Routing Note   Medication(s) are not appropriate for processing by [facility] for the following reason(s):      - Non-participating provider    [facility] action(s):  Route          Medication reconciliation completed: No     Appointments  past 12m or future 3m with PCP    Date Provider   Last Visit   4/20/2021 Azikiwe K. Lombard, MD   Next Visit   Visit date not found Azikiwe K. Lombard, MD   ED visits in past 90 days: 0        Note composed:7:47 PM 10/24/2022

## 2022-10-27 ENCOUNTER — TELEPHONE (OUTPATIENT)
Dept: NEPHROLOGY | Facility: CLINIC | Age: 72
End: 2022-10-27
Payer: MEDICARE

## 2022-11-07 ENCOUNTER — TELEPHONE (OUTPATIENT)
Dept: NEPHROLOGY | Facility: CLINIC | Age: 72
End: 2022-11-07
Payer: MEDICARE

## 2022-11-08 ENCOUNTER — OFFICE VISIT (OUTPATIENT)
Dept: NEPHROLOGY | Facility: CLINIC | Age: 72
End: 2022-11-08
Payer: MEDICARE

## 2022-11-08 VITALS
BODY MASS INDEX: 41.75 KG/M2 | DIASTOLIC BLOOD PRESSURE: 70 MMHG | HEART RATE: 47 BPM | OXYGEN SATURATION: 98 % | HEIGHT: 73 IN | SYSTOLIC BLOOD PRESSURE: 125 MMHG | WEIGHT: 315 LBS

## 2022-11-08 DIAGNOSIS — I48.0 PAROXYSMAL ATRIAL FIBRILLATION: ICD-10-CM

## 2022-11-08 DIAGNOSIS — E11.22 TYPE 2 DIABETES MELLITUS WITH STAGE 4 CHRONIC KIDNEY DISEASE, UNSPECIFIED WHETHER LONG TERM INSULIN USE: ICD-10-CM

## 2022-11-08 DIAGNOSIS — N20.0 NEPHROLITH: ICD-10-CM

## 2022-11-08 DIAGNOSIS — N18.4 STAGE 4 CHRONIC KIDNEY DISEASE: Primary | ICD-10-CM

## 2022-11-08 DIAGNOSIS — D64.9 ANEMIA, UNSPECIFIED TYPE: ICD-10-CM

## 2022-11-08 DIAGNOSIS — N18.4 TYPE 2 DIABETES MELLITUS WITH STAGE 4 CHRONIC KIDNEY DISEASE, UNSPECIFIED WHETHER LONG TERM INSULIN USE: ICD-10-CM

## 2022-11-08 DIAGNOSIS — E66.01 MORBID OBESITY WITH BMI OF 45.0-49.9, ADULT: ICD-10-CM

## 2022-11-08 DIAGNOSIS — I10 HYPERTENSION, UNSPECIFIED TYPE: ICD-10-CM

## 2022-11-08 PROCEDURE — 99214 OFFICE O/P EST MOD 30 MIN: CPT | Mod: PBBFAC | Performed by: NURSE PRACTITIONER

## 2022-11-08 PROCEDURE — 99214 PR OFFICE/OUTPT VISIT, EST, LEVL IV, 30-39 MIN: ICD-10-PCS | Mod: S$PBB,,, | Performed by: NURSE PRACTITIONER

## 2022-11-08 PROCEDURE — 99999 PR PBB SHADOW E&M-EST. PATIENT-LVL IV: CPT | Mod: PBBFAC,,, | Performed by: NURSE PRACTITIONER

## 2022-11-08 PROCEDURE — 99999 PR PBB SHADOW E&M-EST. PATIENT-LVL IV: ICD-10-PCS | Mod: PBBFAC,,, | Performed by: NURSE PRACTITIONER

## 2022-11-08 PROCEDURE — 99214 OFFICE O/P EST MOD 30 MIN: CPT | Mod: S$PBB,,, | Performed by: NURSE PRACTITIONER

## 2022-11-08 NOTE — PROGRESS NOTES
Subjective:       Patient ID: Wale Calvo is a 72 y.o. white male who presents for new evaluation of   No chief complaint on file.      HPI     Patient is new to me. New to clinic.  Prior pertinent chart reviewed since this is patient's first appointment with me. Presents with spouse. Referred here by Dr. Lombard.    Patient presents for new evaluation of CKD.  Baseline creatinine of 1.6-1.8 from 2019-January 2021. sCr of 2.1 in April 2021 and 2.4 in October. No recent labs.    Seen in hospital in December 2020 and diagnosed with afib and started on eliquis.  Admitted to observation in Jan 2021 with ACS. Had plans for LHC but patient left AMA prior to procedure.   Sees cardiologist (in [place]). Discussed ablation but patient is unsure. Has 7 cardiac stents.  Had IV contrast last in September.    Has been off amiodarone while waiting for refill.  Metformin d/c about two months ago.  Takes Aleve 2-3x/month.    Significant hx includes HTN, CAD, HLD, afib, T2DM since 2013, ROSE sleeps CPAP, nephrolithiasis     The patient denies taking NSAIDs, herbal supplements, or new antibiotics, recreational drugs, recent episode of dehydration, diarrhea, nausea or vomiting, acute illness.     Significant family hx includes: no known kidney disease    Last renal US: July 2020, reviewed.    Update 3/11/22:  Presents for f/u of CKD. Last seen by me in December.  Baseline sCr 2.2-2.4 vs 1.6-1.8.  Home BPs: has not been taking recently  No longer on NSAIDs.  Potassium citrate 10 meq BID started after last visit due to 24 hr urine results.  Needed to start low K diet after that due to mild  Hyperkalemia.  Says he thinks he is back in afib right now. Says he has been off the amiodarone per self for about 3 mos and is waiting for the pharmacy to refill it.  Has lost weight 20lb. [employer]ystem. High protein, low glycemic.    Update 11/8/22:  Presents for f/u of CKD.  Baseline sCr 2.2-2.4 vs 1.6-1.8. No recent labs.  Had cardiac  "cath a couple months ago due to afib in [place]. Said his creatinine "went up 2 points" after.  Has 7 stents total.  Has not been needing nitro as much as previously.  Takes NSAIDs. Was taking 3 Advil 3x/day x 3 days.    Review of Systems   Constitutional:  Positive for fatigue (up and down; worse with afib).   Respiratory:  Positive for shortness of breath (with afib).    Cardiovascular:  Positive for palpitations (with afib) and leg swelling (with driving to and from [place]). Negative for chest pain.   Gastrointestinal:  Negative for diarrhea, nausea and vomiting.   Genitourinary:  Negative for difficulty urinating, dysuria and hematuria.     Objective:       Blood pressure 125/70, pulse (!) 47, height 6' 1" (1.854 m), weight (!) 161.3 kg (355 lb 9.6 oz), SpO2 98 %.   Physical Exam  Vitals reviewed.   Constitutional:       General: He is not in acute distress.     Appearance: He is well-developed. He is obese.   Eyes:      Conjunctiva/sclera: Conjunctivae normal.   Neck:      Vascular: No JVD.   Cardiovascular:      Rate and Rhythm: Bradycardia present.   Pulmonary:      Effort: Pulmonary effort is normal.   Abdominal:      Tenderness: There is no right CVA tenderness or left CVA tenderness.   Musculoskeletal:      Cervical back: Neck supple.      Right lower leg: Edema (trace) present.      Left lower leg: Edema (+1) present.   Neurological:      Mental Status: He is alert and oriented to person, place, and time.   Psychiatric:         Mood and Affect: Mood normal.         Behavior: Behavior normal.         Thought Content: Thought content normal.         Judgment: Judgment normal.         Lab Results   Component Value Date    CREATININE 1.9 (H) 06/03/2022    URICACID -0.21 01/03/2022     Prot/Creat Ratio, Urine   Date Value Ref Range Status   06/03/2022 0.13 0.00 - 0.20 Final   03/09/2022 0.12 0.00 - 0.20 Final   12/22/2021 0.12 0.00 - 0.20 Final     Lab Results   Component Value Date     06/03/2022    K 4.7 " "06/03/2022    CO2 23 06/03/2022     06/03/2022     Lab Results   Component Value Date    .5 (H) 06/03/2022    CALCIUM 9.1 06/03/2022    PHOS 3.7 06/03/2022     Lab Results   Component Value Date    HGB 13.6 (L) 06/03/2022    WBC 6.70 06/03/2022    HCT 42.6 06/03/2022      Lab Results   Component Value Date    HGBA1C 6.1 (H) 07/19/2022     (L) 06/03/2022    BUN 30 (H) 06/03/2022     Lab Results   Component Value Date    LDLCALC 77.0 07/19/2022         Assessment:       1. Stage 4 chronic kidney disease    2. Type 2 diabetes mellitus with stage 4 chronic kidney disease, unspecified whether long term insulin use    3. Paroxysmal atrial fibrillation    4. Nephrolith    5. Morbid obesity with BMI of 45.0-49.9, adult    6. Anemia, unspecified type    7. Hypertension, unspecified type          Plan:   CKD stage 3B/4 c eGFR 26-31 mL/min - unclear etiology. Progression possibly r/t DM, HTN, afib, atherosclerotic disease.  Educated patient to control BP, BG, remain well-hydrated, and avoid NSAIDs to prevent progression of CKD. Encouraged weight loss.    Need repeat labs to determine stability.    Uroscopy (12/22/21): many needle-shaped crystals. Will check uric acid level.    UPCR Non-proteinuric.    Acid-base WNL. On K citrate.   Renal osteodystrophy Ca, phos okay. PTH slightly high.   Anemia Hgb at goal for CKD   DM Well-controlled now, but had A1c as high as 8.6 in the past.   Lipid Management On statin.   ESRD planning Previously: Anticipatory guidance provided about timing of dialysis. Start discussions and planning when eGFR is about 20 mL/min; most patients start dialysis between 5-10 mL/min.  Patient says he does not want dialysis. His step-father was on dialysis. He said, "I know what will happen if I don't get it. I will die."       HTN - WNL today on amiodarone 200 mg BID (has not been taking), imdur 60 mg, lisinopril 5 mg, metoprolol tartrate 12.5 mg BID    Nephrolithiasis - likely CaOx. Does " not believe he has had recent issues.  - On Kcitrate 10 meq BID    Hyperkalemia - resolved on low K diet. Still on k-citrate    Morbid obesity - Encouraged weight loss.    All questions patient had were answered.  Asked if further questions. None. F/u in clinic in 3 mos with labs and urine prior to next visit or sooner if needed.  ER for emergency concerns.    Summary of Plan:  1. CBC, RFP, UA, UPCR, uric acid, PTH  2. avoid NSAID/ bactrim/ IV contrast/ gadolinium/ aminoglycoside where possible  3. RTC in 3 mos

## 2022-12-22 ENCOUNTER — OFFICE VISIT (OUTPATIENT)
Dept: FAMILY MEDICINE | Facility: CLINIC | Age: 72
End: 2022-12-22
Payer: MEDICARE

## 2022-12-22 VITALS
OXYGEN SATURATION: 95 % | WEIGHT: 315 LBS | BODY MASS INDEX: 41.75 KG/M2 | RESPIRATION RATE: 18 BRPM | TEMPERATURE: 98 F | HEIGHT: 73 IN | HEART RATE: 56 BPM | DIASTOLIC BLOOD PRESSURE: 56 MMHG | SYSTOLIC BLOOD PRESSURE: 115 MMHG

## 2022-12-22 DIAGNOSIS — E78.00 PURE HYPERCHOLESTEROLEMIA: ICD-10-CM

## 2022-12-22 DIAGNOSIS — E11.22 TYPE 2 DIABETES MELLITUS WITH STAGE 4 CHRONIC KIDNEY DISEASE, WITHOUT LONG-TERM CURRENT USE OF INSULIN: Primary | ICD-10-CM

## 2022-12-22 DIAGNOSIS — I10 ESSENTIAL HYPERTENSION: ICD-10-CM

## 2022-12-22 DIAGNOSIS — N18.4 TYPE 2 DIABETES MELLITUS WITH STAGE 4 CHRONIC KIDNEY DISEASE, WITHOUT LONG-TERM CURRENT USE OF INSULIN: Primary | ICD-10-CM

## 2022-12-22 DIAGNOSIS — I70.0 AORTIC ATHEROSCLEROSIS: ICD-10-CM

## 2022-12-22 DIAGNOSIS — I25.10 CORONARY ARTERY DISEASE INVOLVING NATIVE CORONARY ARTERY OF NATIVE HEART WITHOUT ANGINA PECTORIS: ICD-10-CM

## 2022-12-22 DIAGNOSIS — I48.0 PAROXYSMAL ATRIAL FIBRILLATION: ICD-10-CM

## 2022-12-22 DIAGNOSIS — K21.9 GASTROESOPHAGEAL REFLUX DISEASE WITHOUT ESOPHAGITIS: ICD-10-CM

## 2022-12-22 DIAGNOSIS — Z12.5 ENCOUNTER FOR SCREENING FOR MALIGNANT NEOPLASM OF PROSTATE: ICD-10-CM

## 2022-12-22 PROBLEM — E66.01 MORBID (SEVERE) OBESITY DUE TO EXCESS CALORIES: Status: ACTIVE | Noted: 2022-11-30

## 2022-12-22 PROCEDURE — 99215 PR OFFICE/OUTPT VISIT, EST, LEVL V, 40-54 MIN: ICD-10-PCS | Mod: S$PBB,,, | Performed by: STUDENT IN AN ORGANIZED HEALTH CARE EDUCATION/TRAINING PROGRAM

## 2022-12-22 PROCEDURE — 99215 OFFICE O/P EST HI 40 MIN: CPT | Mod: S$PBB,,, | Performed by: STUDENT IN AN ORGANIZED HEALTH CARE EDUCATION/TRAINING PROGRAM

## 2022-12-22 PROCEDURE — 99213 OFFICE O/P EST LOW 20 MIN: CPT | Mod: PBBFAC,PO | Performed by: STUDENT IN AN ORGANIZED HEALTH CARE EDUCATION/TRAINING PROGRAM

## 2022-12-22 PROCEDURE — 99999 PR PBB SHADOW E&M-EST. PATIENT-LVL III: ICD-10-PCS | Mod: PBBFAC,,, | Performed by: STUDENT IN AN ORGANIZED HEALTH CARE EDUCATION/TRAINING PROGRAM

## 2022-12-22 PROCEDURE — 99999 PR PBB SHADOW E&M-EST. PATIENT-LVL III: CPT | Mod: PBBFAC,,, | Performed by: STUDENT IN AN ORGANIZED HEALTH CARE EDUCATION/TRAINING PROGRAM

## 2022-12-22 RX ORDER — FAMOTIDINE 40 MG/1
40 TABLET, FILM COATED ORAL 2 TIMES DAILY PRN
Qty: 180 TABLET | Refills: 0 | Status: SHIPPED | OUTPATIENT
Start: 2022-12-22 | End: 2023-03-08 | Stop reason: SDUPTHER

## 2022-12-22 RX ORDER — CLOPIDOGREL BISULFATE 75 MG/1
75 TABLET ORAL DAILY
Qty: 90 TABLET | Refills: 1 | Status: SHIPPED | OUTPATIENT
Start: 2022-12-22 | End: 2023-03-03 | Stop reason: SDUPTHER

## 2022-12-22 RX ORDER — LISINOPRIL 5 MG/1
5 TABLET ORAL DAILY
Qty: 90 TABLET | Refills: 0 | Status: SHIPPED | OUTPATIENT
Start: 2022-12-22 | End: 2023-04-05 | Stop reason: SDUPTHER

## 2022-12-22 RX ORDER — APIXABAN 5 MG/1
5 TABLET, FILM COATED ORAL DAILY
Qty: 90 TABLET | Refills: 3 | Status: SHIPPED | OUTPATIENT
Start: 2022-12-22 | End: 2023-01-25 | Stop reason: SDUPTHER

## 2022-12-22 RX ORDER — AMIODARONE HYDROCHLORIDE 200 MG/1
200 TABLET ORAL 2 TIMES DAILY
Qty: 60 TABLET | Refills: 11 | Status: SHIPPED | OUTPATIENT
Start: 2022-12-22

## 2022-12-22 RX ORDER — SEMAGLUTIDE 1.34 MG/ML
INJECTION, SOLUTION SUBCUTANEOUS
COMMUNITY
Start: 2022-11-21 | End: 2023-12-05

## 2022-12-22 RX ORDER — ATORVASTATIN CALCIUM 40 MG/1
40 TABLET, FILM COATED ORAL DAILY
Qty: 90 TABLET | Refills: 3 | Status: SHIPPED | OUTPATIENT
Start: 2022-12-22 | End: 2023-12-27

## 2022-12-22 NOTE — PROGRESS NOTES
Health Maintenance Due   Topic     Shingles Vaccine (1 of 2) No Hx of chickenpox.     Diabetes Urine Screening  Consult pcp    Foot Exam  Consult pcp    Eye Exam  Consult pcp    COVID-19 Vaccine (3 - Moderna risk series) Not offered at this Facility    Influenza Vaccine (1) Pt received

## 2022-12-22 NOTE — PROGRESS NOTES
SUBJECTIVE     Chief Complaint   [facility]    [facility] 72 [facility] with   HTN, CAD, HLD, afib, T2DM since 2013, ROSE sleeps CPAP, nephrolithiasis, CKD4   that presents for follow-up of chronic medical conditions.    Pt is established to this clinic with my colleague Dr. Lombard, but this is my first time seeing Mr. Calvo.    Pt reports he is overall doing well without complaint. BP well controlled, A1C below 7 at last check.     Recently started on ozempic for DMII and weight loss.    PAST MEDICAL HISTORY:  Past Medical History:   Diagnosis Date    Anticoagulant long-term use     Eliquis    Colonic polyp 07/25/2017    Coronary artery disease     CPAP (continuous positive airway pressure) dependence     Diabetes mellitus type II     Diabetes with neurologic complications     ED (erectile dysfunction)     GERD (gastroesophageal reflux disease)     Hyperlipidemia     Hypertension     Kidney stones     Kidney stones     Morbidly obese     Paroxysmal A-fib     Renal manifestation of secondary diabetes mellitus     Sleep apnea        PAST SURGICAL HISTORY:  Past Surgical History:   Procedure Laterality Date    CATARACT EXTRACTION W/  INTRAOCULAR LENS IMPLANT Left 08/16/2016    Dr. Martinez    CATARACT EXTRACTION W/  INTRAOCULAR LENS IMPLANT Right 08/30/2016    Dr. Martinez    CORONARY STENT PLACEMENT      JOINT REPLACEMENT      both knees    SPINE SURGERY      discectomy    TONSILLECTOMY         FAMILY HISTORY:  Family History   Problem Relation Age of Onset    Dementia Mother     Cataracts Mother     Heart disease Father     Alcohol abuse Brother     Drug abuse Daughter     No Known Problems Son     No Known Problems Daughter     No Known Problems Son     No Known Problems Sister     No Known Problems Maternal Aunt     No Known Problems Maternal Uncle     No Known Problems Paternal Aunt     No Known Problems Paternal Uncle     No Known Problems Maternal  Grandmother     No Known Problems Maternal Grandfather     No Known Problems Paternal Grandmother     No Known Problems Paternal Grandfather     Amblyopia Neg Hx     Blindness Neg Hx     Cancer Neg Hx     Diabetes Neg Hx     Glaucoma Neg Hx     Hypertension Neg Hx     Macular degeneration Neg Hx     Retinal detachment Neg Hx     Strabismus Neg Hx     Stroke Neg Hx     Thyroid disease Neg Hx        ALLERGIES AND MEDICATIONS: updated and reviewed.  Review of patient's allergies indicates:   Allergen Reactions    Penicillins Anaphylaxis     Current Outpatient Medications   Medication Sig Dispense Refill    blood-glucose meter kit To check BG 2 times daily, to use with insurance preferred meter 1 each 0    lancets (SAFETY LANCETS) 21 gauge Misc 1 lancet by Misc.(Non-Drug; Combo Route) route 2 (two) times daily. 100 each 11    lancets Misc To check BG 2 times daily, to use with insurance preferred meter 100 each 0    nitroGLYCERIN (NITROSTAT) 0.4 MG SL tablet Place 0.4 mg under the tongue 4 (four) times daily as needed.      OZEMPIC 0.25 mg or 0.5 mg(2 mg/1.5 mL) pen injector SMARTSI.25 Milligram(s) SUB-Q Once a Week      potassium citrate (UROCIT-K) 5 mEq (540 mg) TbSR Take 2 tablets (10 mEq total) by mouth once daily. 180 tablet 3    amiodarone (PACERONE) 200 MG Tab Take 1 tablet (200 mg total) by mouth 2 (two) times daily. 60 tablet 11    atorvastatin (LIPITOR) 40 MG tablet Take 1 tablet (40 mg total) by mouth once daily. 90 tablet 3    clopidogreL (PLAVIX) 75 mg tablet Take 1 tablet (75 mg total) by mouth once daily. 90 tablet 1    ELIQUIS 5 mg Tab Take 1 tablet (5 mg total) by mouth once daily. 90 tablet 3    famotidine (PEPCID) 40 MG tablet Take 1 tablet (40 mg total) by mouth 2 (two) times daily as needed for Heartburn. 180 tablet 0    lisinopriL (PRINIVIL,ZESTRIL) 5 MG tablet Take 1 tablet (5 mg total) by mouth once daily. 90 tablet 0     No current facility-administered medications for this visit.  "      ROS  Review of Systems   Constitutional:  Negative for chills, fatigue and fever.   HENT:  Negative for rhinorrhea and sore throat.    Respiratory:  Negative for cough and shortness of breath.    Cardiovascular:  Negative for chest pain and palpitations.   Gastrointestinal:  Negative for constipation, diarrhea, nausea and vomiting.   Genitourinary:  Negative for dysuria.   Musculoskeletal:  Negative for joint swelling.   Skin:  Negative for rash and wound.   Neurological:  Negative for light-headedness and headaches.   Psychiatric/Behavioral:  Negative for dysphoric mood and sleep disturbance. The patient is not nervous/anxious.        OBJECTIVE     Physical Exam  Vitals:    12/22/22 1014   BP: (!) 115/56   Pulse: (!) 56   Resp: 18   Temp: 98.1 °F (36.7 °C)    Body mass index is 46.54 kg/m².  Weight: (!) 160 kg (352 lb 11.8 oz)   Height: 6' 1" (185.4 cm)     Physical Exam  Vitals reviewed.   Constitutional:       General: He is not in acute distress.  HENT:      Right Ear: External ear normal.      Left Ear: External ear normal.      Nose: Nose normal.      Mouth/Throat:      Mouth: Mucous membranes are moist.   Eyes:      Extraocular Movements: Extraocular movements intact.      Conjunctiva/sclera: Conjunctivae normal.      Pupils: Pupils are equal, round, and reactive to light.   Pulmonary:      Effort: Pulmonary effort is normal.   Abdominal:      General: There is no distension.      Palpations: Abdomen is soft.   Musculoskeletal:         General: No swelling. Normal range of motion.      Cervical back: Normal range of motion.   Skin:     General: Skin is warm and dry.      Findings: No rash.   Neurological:      General: No focal deficit present.      Mental Status: He is alert and oriented to person, place, and time.   Psychiatric:         Mood and Affect: Mood normal.         Behavior: Behavior normal.         Health Maintenance         Date Due Completion Date    Shingles Vaccine (1 of 2) Never done " ---    Diabetes Urine Screening 11/25/2016 11/25/2015    Foot Exam 06/25/2020 6/25/2019    Eye Exam 11/25/2021 11/25/2020    COVID-19 Vaccine (3 - Moderna risk series) 03/04/2022 2/4/2022    Influenza Vaccine (1) 09/01/2022 10/8/2021    Hemoglobin A1c 01/19/2023 7/19/2022    Override on 11/11/2014: Done (future)    Lipid Panel 07/19/2023 7/19/2022    Colorectal Cancer Screening 02/26/2024 2/26/2019    TETANUS VACCINE 08/24/2028 8/24/2018              ASSESSMENT     72 y.o. male with     1. Type 2 diabetes mellitus with stage 4 chronic kidney disease, without long-term current use of insulin    2. Paroxysmal atrial fibrillation    3. Aortic atherosclerosis    4. Coronary artery disease involving native coronary artery of native heart without angina pectoris    5. Essential hypertension    6. Encounter for screening for malignant neoplasm of prostate    7. Pure hypercholesterolemia    8. Gastroesophageal reflux disease without esophagitis        PLAN:     1. Type 2 diabetes mellitus with stage 4 chronic kidney disease, without long-term current use of insulin  - Stable, continue current regimen. Due for laboratory monitoring, ordered. Follow up 6 months.  - Foot Exam Performed  - Microalbumin/Creatinine Ratio, Urine; Future  - CBC Auto Differential; Future  - Comprehensive Metabolic Panel; Future  - TSH; Future  - Lipid Panel; Future  - PSA, SCREENING; Future    2. Paroxysmal atrial fibrillation  - Stable, continue current regimen. Due for laboratory monitoring, ordered. Follow up 6 months.  - CBC Auto Differential; Future  - Comprehensive Metabolic Panel; Future  - TSH; Future  - Lipid Panel; Future  - PSA, SCREENING; Future  - amiodarone (PACERONE) 200 MG Tab; Take 1 tablet (200 mg total) by mouth 2 (two) times daily.  Dispense: 60 tablet; Refill: 11  - ELIQUIS 5 mg Tab; Take 1 tablet (5 mg total) by mouth once daily.  Dispense: 90 tablet; Refill: 3    3. Aortic atherosclerosis  - Stable, continue current regimen.  Due for laboratory monitoring, ordered. Follow up 6 months.  - CBC Auto Differential; Future  - Comprehensive Metabolic Panel; Future  - TSH; Future  - Lipid Panel; Future  - PSA, SCREENING; Future    4. Coronary artery disease involving native coronary artery of native heart without angina pectoris  - Stable, continue current regimen. Due for laboratory monitoring, ordered. Follow up 6 months.  - CBC Auto Differential; Future  - Comprehensive Metabolic Panel; Future  - TSH; Future  - Lipid Panel; Future  - PSA, SCREENING; Future  - clopidogreL (PLAVIX) 75 mg tablet; Take 1 tablet (75 mg total) by mouth once daily.  Dispense: 90 tablet; Refill: 1    5. Essential hypertension  - Stable, continue current regimen. Due for laboratory monitoring, ordered. Follow up 6 months.  - CBC Auto Differential; Future  - Comprehensive Metabolic Panel; Future  - TSH; Future  - Lipid Panel; Future  - PSA, SCREENING; Future  - lisinopriL (PRINIVIL,ZESTRIL) 5 MG tablet; Take 1 tablet (5 mg total) by mouth once daily.  Dispense: 90 tablet; Refill: 0    6. Encounter for screening for malignant neoplasm of prostate  - Ordered after shared decisionmaking with patient.   - PSA, SCREENING; Future    7. Pure hypercholesterolemia   - Stable, continue current regimen. Due for laboratory monitoring, ordered. Follow up 6 months.  - atorvastatin (LIPITOR) 40 MG tablet; Take 1 tablet (40 mg total) by mouth once daily.  Dispense: 90 tablet; Refill: 3    8. Gastroesophageal reflux disease without esophagitis   - Stable, continue current regimen. Due for laboratory monitoring, ordered. Follow up 6 months.  - famotidine (PEPCID) 40 MG tablet; Take 1 tablet (40 mg total) by mouth 2 (two) times daily as needed for Heartburn.  Dispense: 180 tablet; Refill: 0        53 minutes were spent in chart review, documentation and review of results, and evaluation, treatment, and counseling of patient on the same day of service.    Jory Wolf MD  12/22/2022  10:31 AM        No follow-ups on file.

## 2022-12-27 ENCOUNTER — LAB VISIT (OUTPATIENT)
Dept: LAB | Facility: HOSPITAL | Age: 72
End: 2022-12-27
Attending: STUDENT IN AN ORGANIZED HEALTH CARE EDUCATION/TRAINING PROGRAM
Payer: MEDICARE

## 2022-12-27 DIAGNOSIS — E11.22 TYPE 2 DIABETES MELLITUS WITH STAGE 4 CHRONIC KIDNEY DISEASE, WITHOUT LONG-TERM CURRENT USE OF INSULIN: ICD-10-CM

## 2022-12-27 DIAGNOSIS — I48.0 PAROXYSMAL ATRIAL FIBRILLATION: ICD-10-CM

## 2022-12-27 DIAGNOSIS — I10 ESSENTIAL HYPERTENSION: ICD-10-CM

## 2022-12-27 DIAGNOSIS — N18.4 TYPE 2 DIABETES MELLITUS WITH STAGE 4 CHRONIC KIDNEY DISEASE, WITHOUT LONG-TERM CURRENT USE OF INSULIN: ICD-10-CM

## 2022-12-27 DIAGNOSIS — I25.10 CORONARY ARTERY DISEASE INVOLVING NATIVE CORONARY ARTERY OF NATIVE HEART WITHOUT ANGINA PECTORIS: ICD-10-CM

## 2022-12-27 DIAGNOSIS — I70.0 AORTIC ATHEROSCLEROSIS: ICD-10-CM

## 2022-12-27 DIAGNOSIS — Z12.5 ENCOUNTER FOR SCREENING FOR MALIGNANT NEOPLASM OF PROSTATE: ICD-10-CM

## 2022-12-27 LAB
ALBUMIN SERPL BCP-MCNC: 3.9 G/DL (ref 3.5–5.2)
ALP SERPL-CCNC: 117 U/L (ref 55–135)
ALT SERPL W/O P-5'-P-CCNC: 22 U/L (ref 10–44)
ANION GAP SERPL CALC-SCNC: 9 MMOL/L (ref 8–16)
AST SERPL-CCNC: 22 U/L (ref 10–40)
BASOPHILS # BLD AUTO: 0.1 K/UL (ref 0–0.2)
BASOPHILS NFR BLD: 1.3 % (ref 0–1.9)
BILIRUB SERPL-MCNC: 0.9 MG/DL (ref 0.1–1)
BUN SERPL-MCNC: 28 MG/DL (ref 8–23)
CALCIUM SERPL-MCNC: 9.1 MG/DL (ref 8.7–10.5)
CHLORIDE SERPL-SCNC: 108 MMOL/L (ref 95–110)
CHOLEST SERPL-MCNC: 106 MG/DL (ref 120–199)
CHOLEST/HDLC SERPL: 3.2 {RATIO} (ref 2–5)
CO2 SERPL-SCNC: 24 MMOL/L (ref 23–29)
COMPLEXED PSA SERPL-MCNC: 1.3 NG/ML (ref 0–4)
CREAT SERPL-MCNC: 2.1 MG/DL (ref 0.5–1.4)
DIFFERENTIAL METHOD: ABNORMAL
EOSINOPHIL # BLD AUTO: 0.2 K/UL (ref 0–0.5)
EOSINOPHIL NFR BLD: 3 % (ref 0–8)
ERYTHROCYTE [DISTWIDTH] IN BLOOD BY AUTOMATED COUNT: 13.6 % (ref 11.5–14.5)
EST. GFR  (NO RACE VARIABLE): 33 ML/MIN/1.73 M^2
GLUCOSE SERPL-MCNC: 133 MG/DL (ref 70–110)
HCT VFR BLD AUTO: 40.6 % (ref 40–54)
HDLC SERPL-MCNC: 33 MG/DL (ref 40–75)
HDLC SERPL: 31.1 % (ref 20–50)
HGB BLD-MCNC: 12.9 G/DL (ref 14–18)
IMM GRANULOCYTES # BLD AUTO: 0.02 K/UL (ref 0–0.04)
IMM GRANULOCYTES NFR BLD AUTO: 0.3 % (ref 0–0.5)
LDLC SERPL CALC-MCNC: 55.2 MG/DL (ref 63–159)
LYMPHOCYTES # BLD AUTO: 1 K/UL (ref 1–4.8)
LYMPHOCYTES NFR BLD: 12.6 % (ref 18–48)
MCH RBC QN AUTO: 30.6 PG (ref 27–31)
MCHC RBC AUTO-ENTMCNC: 31.8 G/DL (ref 32–36)
MCV RBC AUTO: 96 FL (ref 82–98)
MONOCYTES # BLD AUTO: 0.8 K/UL (ref 0.3–1)
MONOCYTES NFR BLD: 10.1 % (ref 4–15)
NEUTROPHILS # BLD AUTO: 5.6 K/UL (ref 1.8–7.7)
NEUTROPHILS NFR BLD: 72.7 % (ref 38–73)
NONHDLC SERPL-MCNC: 73 MG/DL
NRBC BLD-RTO: 0 /100 WBC
PLATELET # BLD AUTO: 145 K/UL (ref 150–450)
PMV BLD AUTO: 11.4 FL (ref 9.2–12.9)
POTASSIUM SERPL-SCNC: 5.1 MMOL/L (ref 3.5–5.1)
PROT SERPL-MCNC: 7 G/DL (ref 6–8.4)
RBC # BLD AUTO: 4.22 M/UL (ref 4.6–6.2)
SODIUM SERPL-SCNC: 141 MMOL/L (ref 136–145)
TRIGL SERPL-MCNC: 89 MG/DL (ref 30–150)
TSH SERPL DL<=0.005 MIU/L-ACNC: 1.84 UIU/ML (ref 0.4–4)
WBC # BLD AUTO: 7.72 K/UL (ref 3.9–12.7)

## 2022-12-27 PROCEDURE — 80061 LIPID PANEL: CPT | Performed by: STUDENT IN AN ORGANIZED HEALTH CARE EDUCATION/TRAINING PROGRAM

## 2022-12-27 PROCEDURE — 84153 ASSAY OF PSA TOTAL: CPT | Performed by: STUDENT IN AN ORGANIZED HEALTH CARE EDUCATION/TRAINING PROGRAM

## 2022-12-27 PROCEDURE — 85025 COMPLETE CBC W/AUTO DIFF WBC: CPT | Performed by: STUDENT IN AN ORGANIZED HEALTH CARE EDUCATION/TRAINING PROGRAM

## 2022-12-27 PROCEDURE — 80053 COMPREHEN METABOLIC PANEL: CPT | Performed by: STUDENT IN AN ORGANIZED HEALTH CARE EDUCATION/TRAINING PROGRAM

## 2022-12-27 PROCEDURE — 36415 COLL VENOUS BLD VENIPUNCTURE: CPT | Mod: PO | Performed by: STUDENT IN AN ORGANIZED HEALTH CARE EDUCATION/TRAINING PROGRAM

## 2022-12-27 PROCEDURE — 84443 ASSAY THYROID STIM HORMONE: CPT | Performed by: STUDENT IN AN ORGANIZED HEALTH CARE EDUCATION/TRAINING PROGRAM

## 2023-01-05 ENCOUNTER — TELEPHONE (OUTPATIENT)
Dept: FAMILY MEDICINE | Facility: CLINIC | Age: 73
End: 2023-01-05
Payer: MEDICARE

## 2023-01-05 NOTE — TELEPHONE ENCOUNTER
----- Message from Yvette Frazier sent at 1/5/2023 11:27 AM CST -----  Type: RX Refill Request    Who Called: pt     Have you contacted your pharmacy:    Refill or New Rx:lisinopriL (PRINIVIL,ZESTRIL) 5 MG tablet    amiodarone (PACERONE) 200 MG Tab    RX Name and Strength:    How is the patient currently taking it? (ex. 1XDay):    Is this a 30 day or 90 day RX:    Preferred Pharmacy with phone number:  [facility] #[id number] - [place], [address] AT [address] [address]  Phone: [phone] Fax: [phone]        Local or Mail Order:    Ordering Provider:    Would the patient rather a call back or a response via [facility]? call    Best Call Back Number:[phone] (home)       Additional Information:

## 2023-01-18 ENCOUNTER — TELEPHONE (OUTPATIENT)
Dept: FAMILY MEDICINE | Facility: CLINIC | Age: 73
End: 2023-01-18
Payer: MEDICARE

## 2023-01-18 NOTE — TELEPHONE ENCOUNTER
----- Message from Razia Bass sent at 1/18/2023 11:11 AM CST -----  Regarding: [id number]  Type: RX Refill Request    Who Called: self    Have you contacted your pharmacy:yes    Refill or New Rx:refill    RX Name and Strength:ELIQUIS 5 mg Tab      Preferred Pharmacy with phone number:   [facility] #[id number] - [address] AT [address]  [place] [address]  Phone: [phone] Fax: [phone]            Local or Mail Order:local    Ordering Provider:Luciano    Would the patient rather a call back or a response via [facility]? Call back    Best Call Back Number:[phone]      Additional Information: pt states he has only 3 days left of meds

## 2023-01-25 DIAGNOSIS — I48.0 PAROXYSMAL ATRIAL FIBRILLATION: ICD-10-CM

## 2023-01-25 RX ORDER — APIXABAN 5 MG/1
5 TABLET, FILM COATED ORAL 2 TIMES DAILY
Qty: 180 TABLET | Refills: 3 | Status: SHIPPED | OUTPATIENT
Start: 2023-01-25 | End: 2024-01-25

## 2023-01-25 NOTE — TELEPHONE ENCOUNTER
----- Message from Emeli Pierre sent at 1/25/2023 12:25 PM CST -----  Regarding: pt rx wrong  Name of Who is Calling:WENDY FELDMAN [[id number]]          What is the request in detail:pt is calling in regards to his rx of ELIQUIS 5 mg Tab. He states that he is supposed to be taking 2x day, but his prescription said 1x day. Please call back to assist           Can the clinic reply by [facility]:          What Number to Call Back if not [facility]:[phone]

## 2023-02-06 ENCOUNTER — LAB VISIT (OUTPATIENT)
Dept: LAB | Facility: HOSPITAL | Age: 73
End: 2023-02-06
Attending: PHYSICIAN ASSISTANT
Payer: MEDICARE

## 2023-02-06 DIAGNOSIS — N18.4 STAGE 4 CHRONIC KIDNEY DISEASE: ICD-10-CM

## 2023-02-06 LAB
25(OH)D3+25(OH)D2 SERPL-MCNC: 24 NG/ML (ref 30–96)
ALBUMIN SERPL BCP-MCNC: 4 G/DL (ref 3.5–5.2)
ANION GAP SERPL CALC-SCNC: 13 MMOL/L (ref 8–16)
BASOPHILS # BLD AUTO: 0.1 K/UL (ref 0–0.2)
BASOPHILS NFR BLD: 1.5 % (ref 0–1.9)
BUN SERPL-MCNC: 28 MG/DL (ref 8–23)
CALCIUM SERPL-MCNC: 9.1 MG/DL (ref 8.7–10.5)
CHLORIDE SERPL-SCNC: 105 MMOL/L (ref 95–110)
CO2 SERPL-SCNC: 20 MMOL/L (ref 23–29)
CREAT SERPL-MCNC: 2.2 MG/DL (ref 0.5–1.4)
DIFFERENTIAL METHOD: ABNORMAL
EOSINOPHIL # BLD AUTO: 0.2 K/UL (ref 0–0.5)
EOSINOPHIL NFR BLD: 3.4 % (ref 0–8)
ERYTHROCYTE [DISTWIDTH] IN BLOOD BY AUTOMATED COUNT: 13.4 % (ref 11.5–14.5)
EST. GFR  (NO RACE VARIABLE): 31.2 ML/MIN/1.73 M^2
GLUCOSE SERPL-MCNC: 144 MG/DL (ref 70–110)
HCT VFR BLD AUTO: 42.4 % (ref 40–54)
HGB BLD-MCNC: 13.6 G/DL (ref 14–18)
IMM GRANULOCYTES # BLD AUTO: 0.02 K/UL (ref 0–0.04)
IMM GRANULOCYTES NFR BLD AUTO: 0.3 % (ref 0–0.5)
LYMPHOCYTES # BLD AUTO: 1.1 K/UL (ref 1–4.8)
LYMPHOCYTES NFR BLD: 16.3 % (ref 18–48)
MCH RBC QN AUTO: 30.4 PG (ref 27–31)
MCHC RBC AUTO-ENTMCNC: 32.1 G/DL (ref 32–36)
MCV RBC AUTO: 95 FL (ref 82–98)
MONOCYTES # BLD AUTO: 0.8 K/UL (ref 0.3–1)
MONOCYTES NFR BLD: 11.2 % (ref 4–15)
NEUTROPHILS # BLD AUTO: 4.5 K/UL (ref 1.8–7.7)
NEUTROPHILS NFR BLD: 67.3 % (ref 38–73)
NRBC BLD-RTO: 0 /100 WBC
PHOSPHATE SERPL-MCNC: 3.6 MG/DL (ref 2.7–4.5)
PLATELET # BLD AUTO: 148 K/UL (ref 150–450)
PMV BLD AUTO: 11.9 FL (ref 9.2–12.9)
POTASSIUM SERPL-SCNC: 4.6 MMOL/L (ref 3.5–5.1)
PTH-INTACT SERPL-MCNC: 84.1 PG/ML (ref 9–77)
RBC # BLD AUTO: 4.47 M/UL (ref 4.6–6.2)
SODIUM SERPL-SCNC: 138 MMOL/L (ref 136–145)
WBC # BLD AUTO: 6.67 K/UL (ref 3.9–12.7)

## 2023-02-06 PROCEDURE — 80069 RENAL FUNCTION PANEL: CPT | Performed by: NURSE PRACTITIONER

## 2023-02-06 PROCEDURE — 85025 COMPLETE CBC W/AUTO DIFF WBC: CPT | Performed by: NURSE PRACTITIONER

## 2023-02-06 PROCEDURE — 82306 VITAMIN D 25 HYDROXY: CPT | Performed by: NURSE PRACTITIONER

## 2023-02-06 PROCEDURE — 83970 ASSAY OF PARATHORMONE: CPT | Performed by: NURSE PRACTITIONER

## 2023-02-06 PROCEDURE — 36415 COLL VENOUS BLD VENIPUNCTURE: CPT | Mod: PO | Performed by: NURSE PRACTITIONER

## 2023-02-08 ENCOUNTER — OFFICE VISIT (OUTPATIENT)
Dept: NEPHROLOGY | Facility: CLINIC | Age: 73
End: 2023-02-08
Payer: MEDICARE

## 2023-02-08 VITALS
HEART RATE: 53 BPM | WEIGHT: 315 LBS | DIASTOLIC BLOOD PRESSURE: 75 MMHG | SYSTOLIC BLOOD PRESSURE: 121 MMHG | HEIGHT: 72 IN | BODY MASS INDEX: 42.66 KG/M2 | OXYGEN SATURATION: 96 %

## 2023-02-08 DIAGNOSIS — E66.01 MORBID OBESITY WITH BMI OF 45.0-49.9, ADULT: ICD-10-CM

## 2023-02-08 DIAGNOSIS — N20.0 NEPHROLITH: ICD-10-CM

## 2023-02-08 DIAGNOSIS — I10 HYPERTENSION, UNSPECIFIED TYPE: ICD-10-CM

## 2023-02-08 DIAGNOSIS — N18.30 STAGE 3 CHRONIC KIDNEY DISEASE, UNSPECIFIED WHETHER STAGE 3A OR 3B CKD: Primary | ICD-10-CM

## 2023-02-08 DIAGNOSIS — E11.9 TYPE 2 DIABETES MELLITUS WITHOUT COMPLICATION: ICD-10-CM

## 2023-02-08 DIAGNOSIS — E11.22 TYPE 2 DIABETES MELLITUS WITH STAGE 3 CHRONIC KIDNEY DISEASE, UNSPECIFIED WHETHER LONG TERM INSULIN USE, UNSPECIFIED WHETHER STAGE 3A OR 3B CKD: ICD-10-CM

## 2023-02-08 DIAGNOSIS — N18.30 TYPE 2 DIABETES MELLITUS WITH STAGE 3 CHRONIC KIDNEY DISEASE, UNSPECIFIED WHETHER LONG TERM INSULIN USE, UNSPECIFIED WHETHER STAGE 3A OR 3B CKD: ICD-10-CM

## 2023-02-08 DIAGNOSIS — D64.9 ANEMIA, UNSPECIFIED TYPE: ICD-10-CM

## 2023-02-08 DIAGNOSIS — N25.81 SECONDARY HYPERPARATHYROIDISM: ICD-10-CM

## 2023-02-08 PROCEDURE — 99999 PR PBB SHADOW E&M-EST. PATIENT-LVL IV: ICD-10-PCS | Mod: PBBFAC,,, | Performed by: NURSE PRACTITIONER

## 2023-02-08 PROCEDURE — 99214 OFFICE O/P EST MOD 30 MIN: CPT | Mod: S$PBB,,, | Performed by: NURSE PRACTITIONER

## 2023-02-08 PROCEDURE — 99999 PR PBB SHADOW E&M-EST. PATIENT-LVL IV: CPT | Mod: PBBFAC,,, | Performed by: NURSE PRACTITIONER

## 2023-02-08 PROCEDURE — 99214 OFFICE O/P EST MOD 30 MIN: CPT | Mod: PBBFAC | Performed by: NURSE PRACTITIONER

## 2023-02-08 PROCEDURE — 99214 PR OFFICE/OUTPT VISIT, EST, LEVL IV, 30-39 MIN: ICD-10-PCS | Mod: S$PBB,,, | Performed by: NURSE PRACTITIONER

## 2023-02-08 RX ORDER — SODIUM BICARBONATE 650 MG/1
650 TABLET ORAL 2 TIMES DAILY
Qty: 180 TABLET | Refills: 3 | Status: SHIPPED | OUTPATIENT
Start: 2023-02-08 | End: 2023-12-05

## 2023-02-08 NOTE — PATIENT INSTRUCTIONS
- Sufficient fluid intake distributed throughout the day to produce at least 2 liters of urine per day, including drinking at night   - Avoiding excessive animal protein in the diet.   - Limiting dietary sodium to 100 meq/day.    - Increasing dietary potassium intake   - adding citrate to water in the form of christiano, lemon juice, or crystal light  - Limiting dietary sucrose and fructose.  - Limiting dietary oxalate (limit iced tea and imtiaz as well as foods high in oxalate)  - limit vitamin C supplements.       Start vitamin D over-the-counter. 2000 IU daily.

## 2023-02-08 NOTE — PROGRESS NOTES
Subjective:       Patient ID: Wale Calvo is a 72 y.o. white male who presents for new evaluation of   No chief complaint on file.        HPI     Patient is new to me. New to clinic.  Prior pertinent chart reviewed since this is patient's first appointment with me. Presents with spouse. Referred here by Dr. Lombard.    Patient presents for new evaluation of CKD.  Baseline creatinine of 1.6-1.8 from 2019-January 2021. sCr of 2.1 in April 2021 and 2.4 in October. No recent labs.    Seen in hospital in December 2020 and diagnosed with afib and started on eliquis.  Admitted to observation in Jan 2021 with ACS. Had plans for LHC but patient left AMA prior to procedure.   Sees cardiologist (in [place]). Discussed ablation but patient is unsure. Has 7 cardiac stents.  Had IV contrast last in September.    Has been off amiodarone while waiting for refill.  Metformin d/c about two months ago.  Takes Aleve 2-3x/month.    Significant hx includes HTN, CAD, HLD, afib, T2DM since 2013, ROSE sleeps CPAP, nephrolithiasis     The patient denies taking NSAIDs, herbal supplements, or new antibiotics, recreational drugs, recent episode of dehydration, diarrhea, nausea or vomiting, acute illness.     Significant family hx includes: no known kidney disease    Last renal US: July 2020, reviewed.    Update 3/11/22:  Presents for f/u of CKD. Last seen by me in December.  Baseline sCr 2.2-2.4 vs 1.6-1.8.  Home BPs: has not been taking recently  No longer on NSAIDs.  Potassium citrate 10 meq BID started after last visit due to 24 hr urine results.  Needed to start low K diet after that due to mild  Hyperkalemia.  Says he thinks he is back in afib right now. Says he has been off the amiodarone per self for about 3 mos and is waiting for the pharmacy to refill it.  Has lost weight 20lb. Nutrisystem. High protein, low glycemic.    Update 11/8/22:  Presents for f/u of CKD.  Baseline sCr 2.2-2.4 vs 1.6-1.8. No recent labs.  Had  "cardiac cath a couple months ago due to afib in [place]. Said his creatinine "went up 2 points" after.  Has 7 stents total.  Has not been needing nitro as much as previously.  Takes NSAIDs. Was taking 3 Advil 3x/day x 3 days.    Update 2/8/23:  Presents for f/u of CKD c spouse.  Baseline sCr 2.2-2.4.  Home BPs: 120s/70s  Had taken Advil for about 4 days.  Denies NSAID use otherwise.  Working on weight loss c Ozempic.  Has been off potassium citrate.    Review of Systems   Constitutional:  Positive for fatigue (up and down; worse with afib).   Respiratory:  Positive for shortness of breath (with afib).    Cardiovascular:  Positive for leg swelling (resolves overnight).   Gastrointestinal:  Negative for diarrhea, nausea and vomiting.   Genitourinary:  Negative for difficulty urinating, dysuria, frequency and hematuria.     Objective:       Blood pressure 121/75, pulse (!) 53, height 6' (1.829 m), weight (!) 161 kg (354 lb 15.1 oz), SpO2 96 %.   Physical Exam  Vitals reviewed.   Constitutional:       General: He is not in acute distress.     Appearance: He is well-developed. He is obese.   Eyes:      Conjunctiva/sclera: Conjunctivae normal.   Neck:      Vascular: No JVD.   Cardiovascular:      Rate and Rhythm: Bradycardia present.   Pulmonary:      Effort: Pulmonary effort is normal.   Abdominal:      Tenderness: There is no right CVA tenderness or left CVA tenderness.   Musculoskeletal:      Cervical back: Neck supple.      Right lower leg: Edema (+1) present.      Left lower leg: Edema (+1) present.   Neurological:      Mental Status: He is alert and oriented to person, place, and time.   Psychiatric:         Mood and Affect: Mood normal.         Behavior: Behavior normal.         Thought Content: Thought content normal.         Judgment: Judgment normal.         Lab Results   Component Value Date    CREATININE 2.2 (H) 02/06/2023    URICACID 8.5 (H) 11/08/2022     Prot/Creat Ratio, Urine   Date Value Ref Range Status "   11/08/2022 0.06 0.00 - 0.20 Final   06/03/2022 0.13 0.00 - 0.20 Final   03/09/2022 0.12 0.00 - 0.20 Final     Lab Results   Component Value Date     02/06/2023    K 4.6 02/06/2023    CO2 20 (L) 02/06/2023     02/06/2023     Lab Results   Component Value Date    PTH 84.1 (H) 02/06/2023    CALCIUM 9.1 02/06/2023    PHOS 3.6 02/06/2023     Lab Results   Component Value Date    HGB 13.6 (L) 02/06/2023    WBC 6.67 02/06/2023    HCT 42.4 02/06/2023      Lab Results   Component Value Date    HGBA1C 6.1 (H) 07/19/2022     (L) 02/06/2023    BUN 28 (H) 02/06/2023     Lab Results   Component Value Date    LDLCALC 55.2 (L) 12/27/2022         Assessment:       1. Stage 3 chronic kidney disease, unspecified whether stage 3a or 3b CKD    2. Nephrolith    3. Morbid obesity with BMI of 45.0-49.9, adult    4. Anemia, unspecified type    5. Hypertension, unspecified type    6. Secondary hyperparathyroidism    7. Type 2 diabetes mellitus with stage 3 chronic kidney disease, unspecified whether long term insulin use, unspecified whether stage 3a or 3b CKD            Plan:   CKD stage 3 - unclear etiology, but clinically it could be r/t  DM, HTN, afib, atherosclerotic disease.  Stable. Educated patient to control BP, BG, remain well-hydrated, and avoid NSAIDs to prevent progression of CKD. Encouraged weight loss.    Uroscopy: many needle-shaped crystals. Will check uric acid level.    UPCR Non-proteinuric.    Acid-base Low serum bicarb x 1. Off K citrate. Start sodium bicarb 650 mg BID   Renal osteodystrophy Ca, phos okay. PTH slightly high. Vit D low.   Anemia Hgb at goal for CKD   DM Well-controlled now, but had A1c as high as 8.6 in the past.   Lipid Management On statin.   ESRD planning Previously: Anticipatory guidance provided about timing of dialysis. Start discussions and planning when eGFR is about 20 mL/min; most patients start dialysis between 5-10 mL/min.  Patient says he does not want dialysis. His  "step-father was on dialysis. He said, "I know what will happen if I don't get it. I will die."       HTN - WNL on amiodarone 100 mg daily,  lisinopril 5 mg    Nephrolithiasis - likely CaOx. Does not believe he has had recent issues.  - Was on Kcitrate 10 meq BID but stopped due to aftertaste  - Drinking about 60-80 oz/ day (water, diet coke, sprite)  - Has been drinking crystal light and lemonade. Repeat supersaturation prior to next visit.    Hyperkalemia - resolved on low K diet. Off k-citrate.    Morbid obesity - Now on Ozempic for weight loss    All questions patient had were answered.  Asked if further questions. None. F/u in clinic in 8 mos with labs and urine prior to next visit or sooner if needed.  ER for emergency concerns.    Summary of Plan:  1. Start vit D 2,000 IU daily.  2. UA, UPCR  3. Sodium bicarbonate 650 mg  4. avoid NSAID/ bactrim/ IV contrast/ gadolinium/ aminoglycoside where possible  5. RTC in 8 mos                  "

## 2023-02-09 ENCOUNTER — HOSPITAL ENCOUNTER (EMERGENCY)
Facility: HOSPITAL | Age: 73
Discharge: HOME | End: 2023-02-09
Attending: EMERGENCY MEDICINE
Payer: MEDICARE

## 2023-02-09 ENCOUNTER — OFFICE VISIT (OUTPATIENT)
Dept: FAMILY MEDICINE | Facility: CLINIC | Age: 73
End: 2023-02-09
Payer: MEDICARE

## 2023-02-09 VITALS
SYSTOLIC BLOOD PRESSURE: 126 MMHG | HEART RATE: 50 BPM | BODY MASS INDEX: 41.75 KG/M2 | WEIGHT: 315 LBS | HEIGHT: 73 IN | DIASTOLIC BLOOD PRESSURE: 70 MMHG | OXYGEN SATURATION: 95 % | TEMPERATURE: 98 F

## 2023-02-09 VITALS
SYSTOLIC BLOOD PRESSURE: 139 MMHG | RESPIRATION RATE: 12 BRPM | WEIGHT: 315 LBS | HEART RATE: 59 BPM | TEMPERATURE: 98 F | BODY MASS INDEX: 40.43 KG/M2 | DIASTOLIC BLOOD PRESSURE: 71 MMHG | HEIGHT: 74 IN | OXYGEN SATURATION: 99 %

## 2023-02-09 DIAGNOSIS — R10.11 RIGHT UPPER QUADRANT ABDOMINAL PAIN: Primary | ICD-10-CM

## 2023-02-09 DIAGNOSIS — R10.11 RECURRENT RIGHT UPPER QUADRANT ABDOMINAL PAIN: ICD-10-CM

## 2023-02-09 DIAGNOSIS — K29.00 ACUTE GASTRITIS, PRESENCE OF BLEEDING UNSPECIFIED, UNSPECIFIED GASTRITIS TYPE: Primary | ICD-10-CM

## 2023-02-09 LAB
ALBUMIN SERPL BCP-MCNC: 4.1 G/DL (ref 3.5–5.2)
ALP SERPL-CCNC: 106 U/L (ref 55–135)
ALT SERPL W/O P-5'-P-CCNC: 25 U/L (ref 10–44)
ANION GAP SERPL CALC-SCNC: 7 MMOL/L (ref 8–16)
AST SERPL-CCNC: 23 U/L (ref 10–40)
BASOPHILS # BLD AUTO: 0.08 K/UL (ref 0–0.2)
BASOPHILS NFR BLD: 1.3 % (ref 0–1.9)
BILIRUB SERPL-MCNC: 1.1 MG/DL (ref 0.1–1)
BILIRUB UR QL STRIP: NEGATIVE
BUN SERPL-MCNC: 25 MG/DL (ref 8–23)
CALCIUM SERPL-MCNC: 9.1 MG/DL (ref 8.7–10.5)
CHLORIDE SERPL-SCNC: 107 MMOL/L (ref 95–110)
CLARITY UR: CLEAR
CO2 SERPL-SCNC: 24 MMOL/L (ref 23–29)
COLOR UR: COLORLESS
CREAT SERPL-MCNC: 2.1 MG/DL (ref 0.5–1.4)
DIFFERENTIAL METHOD: ABNORMAL
EOSINOPHIL # BLD AUTO: 0.2 K/UL (ref 0–0.5)
EOSINOPHIL NFR BLD: 3.4 % (ref 0–8)
ERYTHROCYTE [DISTWIDTH] IN BLOOD BY AUTOMATED COUNT: 13.4 % (ref 11.5–14.5)
EST. GFR  (NO RACE VARIABLE): 33 ML/MIN/1.73 M^2
GLUCOSE SERPL-MCNC: 123 MG/DL (ref 70–110)
GLUCOSE UR QL STRIP: NEGATIVE
HCT VFR BLD AUTO: 40.5 % (ref 40–54)
HGB BLD-MCNC: 13.3 G/DL (ref 14–18)
HGB UR QL STRIP: NEGATIVE
IMM GRANULOCYTES # BLD AUTO: 0.03 K/UL (ref 0–0.04)
IMM GRANULOCYTES NFR BLD AUTO: 0.5 % (ref 0–0.5)
KETONES UR QL STRIP: NEGATIVE
LEUKOCYTE ESTERASE UR QL STRIP: NEGATIVE
LIPASE SERPL-CCNC: 62 U/L (ref 4–60)
LYMPHOCYTES # BLD AUTO: 1 K/UL (ref 1–4.8)
LYMPHOCYTES NFR BLD: 15.8 % (ref 18–48)
MCH RBC QN AUTO: 30.4 PG (ref 27–31)
MCHC RBC AUTO-ENTMCNC: 32.8 G/DL (ref 32–36)
MCV RBC AUTO: 93 FL (ref 82–98)
MONOCYTES # BLD AUTO: 0.6 K/UL (ref 0.3–1)
MONOCYTES NFR BLD: 9.8 % (ref 4–15)
NEUTROPHILS # BLD AUTO: 4.3 K/UL (ref 1.8–7.7)
NEUTROPHILS NFR BLD: 69.2 % (ref 38–73)
NITRITE UR QL STRIP: NEGATIVE
NRBC BLD-RTO: 0 /100 WBC
PH UR STRIP: 6 [PH] (ref 5–8)
PLATELET # BLD AUTO: 136 K/UL (ref 150–450)
PMV BLD AUTO: 11.8 FL (ref 9.2–12.9)
POTASSIUM SERPL-SCNC: 4.6 MMOL/L (ref 3.5–5.1)
PROT SERPL-MCNC: 7.5 G/DL (ref 6–8.4)
PROT UR QL STRIP: NEGATIVE
RBC # BLD AUTO: 4.38 M/UL (ref 4.6–6.2)
SODIUM SERPL-SCNC: 138 MMOL/L (ref 136–145)
SP GR UR STRIP: 1.01 (ref 1–1.03)
URN SPEC COLLECT METH UR: ABNORMAL
UROBILINOGEN UR STRIP-ACNC: NEGATIVE EU/DL
WBC # BLD AUTO: 6.14 K/UL (ref 3.9–12.7)

## 2023-02-09 PROCEDURE — 99215 OFFICE O/P EST HI 40 MIN: CPT | Mod: PBBFAC,PN | Performed by: INTERNAL MEDICINE

## 2023-02-09 PROCEDURE — 99999 PR PBB SHADOW E&M-EST. PATIENT-LVL V: CPT | Mod: PBBFAC,,, | Performed by: INTERNAL MEDICINE

## 2023-02-09 PROCEDURE — 99284 EMERGENCY DEPT VISIT MOD MDM: CPT | Mod: 25,27

## 2023-02-09 PROCEDURE — 80053 COMPREHEN METABOLIC PANEL: CPT | Performed by: EMERGENCY MEDICINE

## 2023-02-09 PROCEDURE — 81003 URINALYSIS AUTO W/O SCOPE: CPT | Performed by: EMERGENCY MEDICINE

## 2023-02-09 PROCEDURE — 99214 PR OFFICE/OUTPT VISIT, EST, LEVL IV, 30-39 MIN: ICD-10-PCS | Mod: S$PBB,,, | Performed by: INTERNAL MEDICINE

## 2023-02-09 PROCEDURE — 83690 ASSAY OF LIPASE: CPT | Performed by: EMERGENCY MEDICINE

## 2023-02-09 PROCEDURE — 99214 OFFICE O/P EST MOD 30 MIN: CPT | Mod: S$PBB,,, | Performed by: INTERNAL MEDICINE

## 2023-02-09 PROCEDURE — 99999 PR PBB SHADOW E&M-EST. PATIENT-LVL V: ICD-10-PCS | Mod: PBBFAC,,, | Performed by: INTERNAL MEDICINE

## 2023-02-09 PROCEDURE — 85025 COMPLETE CBC W/AUTO DIFF WBC: CPT | Performed by: EMERGENCY MEDICINE

## 2023-02-09 RX ORDER — PANTOPRAZOLE SODIUM 40 MG/1
40 TABLET, DELAYED RELEASE ORAL DAILY
Qty: 30 TABLET | Refills: 11 | Status: CANCELLED | OUTPATIENT
Start: 2023-02-09 | End: 2024-02-09

## 2023-02-09 RX ORDER — DICYCLOMINE HYDROCHLORIDE 20 MG/1
20 TABLET ORAL EVERY 6 HOURS PRN
Qty: 20 TABLET | Refills: 0 | Status: SHIPPED | OUTPATIENT
Start: 2023-02-09 | End: 2023-03-11

## 2023-02-09 RX ORDER — PANTOPRAZOLE SODIUM 40 MG/1
40 TABLET, DELAYED RELEASE ORAL DAILY
Qty: 30 TABLET | Refills: 11 | Status: SHIPPED | OUTPATIENT
Start: 2023-02-09 | End: 2024-02-09

## 2023-02-09 NOTE — ED TRIAGE NOTES
Pt comes in w/abd pain that he states has been intermittent for last 2 months increasing over last two days described as a stabbing sensation in RUQ. Pt is A&Ox3, resp E&U, denes CP or SOB. Ambulatory w/steady gait.

## 2023-02-09 NOTE — PROGRESS NOTES
HISTORY OF PRESENT ILLNESS:  Wale Calvo is a 72 y.o. male who presents to the clinic today for abdominal pain.    My first encounter with patient.    Pain in epigastric and slightly to the right.  Intermittent for 4 weeks.  It is sharp and much worse since this morning - constant and rated as 8-9/10 intensity.  No nausea or vomiting.  No fever or chills.  No blood in BM.  No melena.  Had normal BM this morning.  No diarrhea.  Takes Pepcid bid.  NSAID:  Took 2-3 Advil last week.  Four months ago he was taking more of the Advil for management of pain for a brief period.  Colonoscopy 7/2017 at [facility] and due for repeat as of 2020 due to multiple polyps.  Reports he had EGD in the past with what sounds like esophagitis findings on his description.    PAST MEDICAL HISTORY:  Past Medical History:   Diagnosis Date    Anticoagulant long-term use     Eliquis    Colonic polyp 07/25/2017    Coronary artery disease     CPAP (continuous positive airway pressure) dependence     Diabetes mellitus type II     Diabetes with neurologic complications     ED (erectile dysfunction)     GERD (gastroesophageal reflux disease)     Hyperlipidemia     Hypertension     Kidney stones     Kidney stones     Morbidly obese     Paroxysmal A-fib     Renal manifestation of secondary diabetes mellitus     Sleep apnea        PAST SURGICAL HISTORY:  Past Surgical History:   Procedure Laterality Date    CATARACT EXTRACTION W/  INTRAOCULAR LENS IMPLANT Left 08/16/2016    Dr. Martinez    CATARACT EXTRACTION W/  INTRAOCULAR LENS IMPLANT Right 08/30/2016    Dr. Martinez    CORONARY STENT PLACEMENT      JOINT REPLACEMENT      both knees    SPINE SURGERY      discectomy    TONSILLECTOMY         SOCIAL HISTORY:  Social History     Socioeconomic History    Marital status:      Spouse name: kolton    Number of children: 4    Highest education level: Bachelor's degree (e.g., BA, AB, BS)   Occupational History     Employer: [employer]    Tobacco Use    Smoking status: Never    Smokeless tobacco: Never   Substance and Sexual Activity    Alcohol use: No    Drug use: No    Sexual activity: Yes     Partners: Female     Social Determinants of Health     Financial Resource Strain: Low Risk     Difficulty of Paying Living Expenses: Not hard at all   Food Insecurity: No Food Insecurity    Worried About Running Out of Food in the Last Year: Never true    Ran Out of Food in the Last Year: Never true   Transportation Needs: No Transportation Needs    Lack of Transportation (Medical): No    Lack of Transportation (Non-Medical): No   Physical Activity: Inactive    Days of Exercise per Week: 0 days    Minutes of Exercise per Session: 0 min   Stress: No Stress Concern Present    Feeling of Stress : Only a little   Social Connections: Moderately Integrated    Frequency of Communication with Friends and Family: More than three times a week    Frequency of Social Gatherings with Friends and Family: More than three times a week    Attends Denominational Services: 1 to 4 times per year    Active Member of Clubs or Organizations: No    Attends Club or Organization Meetings: Never    Marital Status:    Housing Stability: Unknown    Unable to Pay for Housing in the Last Year: No    Unstable Housing in the Last Year: No       FAMILY HISTORY:  Family History   Problem Relation Age of Onset    Dementia Mother     Cataracts Mother     Heart disease Father     Alcohol abuse Brother     Drug abuse Daughter     No Known Problems Son     No Known Problems Daughter     No Known Problems Son     No Known Problems Sister     No Known Problems Maternal Aunt     No Known Problems Maternal Uncle     No Known Problems Paternal Aunt     No Known Problems Paternal Uncle     No Known Problems Maternal Grandmother     No Known Problems Maternal Grandfather     No Known Problems Paternal Grandmother     No Known Problems Paternal Grandfather     Amblyopia Neg Hx     Blindness Neg Hx      Cancer Neg Hx     Diabetes Neg Hx     Glaucoma Neg Hx     Hypertension Neg Hx     Macular degeneration Neg Hx     Retinal detachment Neg Hx     Strabismus Neg Hx     Stroke Neg Hx     Thyroid disease Neg Hx        ALLERGIES AND MEDICATIONS: updated and reviewed.  Review of patient's allergies indicates:   Allergen Reactions    Penicillins Anaphylaxis     Medication List with Changes/Refills   Current Medications    AMIODARONE (PACERONE) 200 MG TAB    Take 1 tablet (200 mg total) by mouth 2 (two) times daily.    ATORVASTATIN (LIPITOR) 40 MG TABLET    Take 1 tablet (40 mg total) by mouth once daily.    BLOOD-GLUCOSE METER KIT    To check BG 2 times daily, to use with insurance preferred meter    CLOPIDOGREL (PLAVIX) 75 MG TABLET    Take 1 tablet (75 mg total) by mouth once daily.    ELIQUIS 5 MG TAB    Take 1 tablet (5 mg total) by mouth 2 (two) times daily.    FAMOTIDINE (PEPCID) 40 MG TABLET    Take 1 tablet (40 mg total) by mouth 2 (two) times daily as needed for Heartburn.    LANCETS (SAFETY LANCETS) 21 GAUGE MISC    1 lancet by Misc.(Non-Drug; Combo Route) route 2 (two) times daily.    LANCETS MISC    To check BG 2 times daily, to use with insurance preferred meter    LISINOPRIL (PRINIVIL,ZESTRIL) 5 MG TABLET    Take 1 tablet (5 mg total) by mouth once daily.    NITROGLYCERIN (NITROSTAT) 0.4 MG SL TABLET    Place 0.4 mg under the tongue 4 (four) times daily as needed.    OZEMPIC 0.25 MG OR 0.5 MG(2 MG/1.5 ML) PEN INJECTOR    SMARTSI.25 Milligram(s) SUB-Q Once a Week    SODIUM BICARBONATE 650 MG TABLET    Take 1 tablet (650 mg total) by mouth 2 (two) times daily.          CARE TEAM:  Patient Care Team:  Jory Wolf MD as PCP - General (Family Medicine)  Gilberto Khoury MD (Inactive) as Consulting Physician (Gastroenterology)  Gino Heath MD as Consulting Physician (Cardiology)  Tiera Cruz LPN as Licensed Practical Nurse  Kristie Louis LPN as Care Coordinator         REVIEW  OF SYSTEMS:  Review of Systems   Constitutional:  Negative for chills and fever.   HENT:  Negative for congestion and postnasal drip.    Eyes:  Negative for photophobia and visual disturbance.   Respiratory:  Negative for cough and shortness of breath.    Cardiovascular:  Negative for chest pain and palpitations.   Gastrointestinal:  Positive for abdominal pain. Negative for constipation, diarrhea, nausea and vomiting.   Genitourinary:  Negative for dysuria and frequency.   Musculoskeletal:  Negative for arthralgias and back pain.   Neurological:  Negative for light-headedness and headaches.   Psychiatric/Behavioral:  Negative for dysphoric mood. The patient is not nervous/anxious.        PHYSICAL EXAM:  Vitals:    02/09/23 1002   BP: 126/70   Pulse: (!) 50   Temp: 97.9 °F (36.6 °C)             Body mass index is 47.21 kg/m².    Physical Examination: General appearance - alert, well appearing, and in no distress and oriented to person, place, and time  Mental status - alert, oriented to person, place, and time, normal mood, behavior, speech, dress, motor activity, and thought processes  Eyes - sclera anicteric, left eye normal, right eye normal  Chest - clear to auscultation, no wheezes, rales or rhonchi, symmetric air entry, no tachypnea, retractions or cyanosis  Heart - irregularly irregular rhythm with rate controlled  Abdomen - tenderness noted epigastric  bowel sounds normal  no pulsatile masses  no CVA tenderness  HERNIA EXAM: umbilical hernia  Neurological - alert, oriented, normal speech, no focal findings or movement disorder noted  Extremities - pedal edema, no clubbing or cyanosis       ASSESSMENT AND PLAN:  Acute gastritis, presence of bleeding unspecified, unspecified gastritis type  -     Ambulatory referral/consult to Gastroenterology; Future; Expected date: 02/16/2023  -     pantoprazole (PROTONIX) 40 MG tablet; Take 1 tablet (40 mg total) by mouth once daily.  Dispense: 30 tablet; Refill: 11  - In  setting of acute severity of symptoms along with reported h/o NSAID use and ongoing anticoagulation, patient was advised to seek evaluation in the ED for possible evaluation of ulcer, gastritis, or gallbladder stones or other causes.  Nature of symptoms at this time does not appear consistent with presentation for kidney stones which he has had in the past.  Blood and urine testing results from 2/6/23 were reviewed and will defer further testing to ED evaluation.  Report was conveyed to ED staff.  - To follow up with primary care on conclusion of hospital evaluation.    Follow up in upcoming month or sooner as needed.

## 2023-02-09 NOTE — ED PROVIDER NOTES
"Encounter Date: 2/9/2023    SCRIBE #1 NOTE: I, Ce Powell, am scribing for, and in the presence of,  Garcia Childs MD. I have scribed the following portions of the note - Other sections scribed: HPI, ROS.     History     Chief Complaint   Patient presents with    Abdominal Pain     Patient reports a stabbing upper right abdominal pian that started this morning. Denies nausea, vomiting, diarrhea, constipation, fever, chills. Denies GI hx. Denies taking medication for symptoms pta. Denies chest pain, sob. Last BM was this morning, normal per patient.     CC: abdominal pain    HPI: This is a 72 y.o.male patient, with a PMHx of CAD, DM, GERD, HTN, Hyperlipidemia, and Paroxysmal A-fib, presenting to the ED for further evaluation of constant RUQ abdominal pain beginning this morning. Patient states pain has resolved now. Patient reports pain was severe that he was bending over due to the pain. Patient denies taking any medications to help alleviate the symptoms. Patient reports last bowel movement was this morning, which was normal. Patient reports having episodes of intermittent RUQ abdominal pain couple months ago. Patient states each episode lasts for a second. Patient reports, " it is like a pulled muscle or something." Patient denies having any abdominal surgeries in the past. Patient states he has had kidney stones in the past. Patient denies cough, shortness of breath, chest pain, fever, chills, nausea, vomiting, diarrhea, dysuria, headaches, congestion, sore throat, arm or leg trouble, eye pain, ear pain, rash, or other associated symptoms. No prior Tx. No alleviating or aggravating factors.      The history is provided by the patient and the spouse. No  was used.   Review of patient's allergies indicates:   Allergen Reactions    Penicillins Anaphylaxis     Past Medical History:   Diagnosis Date    Anticoagulant long-term use     Eliquis    Colonic polyp 07/25/2017    Coronary artery " disease     CPAP (continuous positive airway pressure) dependence     Diabetes mellitus type II     Diabetes with neurologic complications     ED (erectile dysfunction)     GERD (gastroesophageal reflux disease)     Hyperlipidemia     Hypertension     Kidney stones     Kidney stones     Morbidly obese     Paroxysmal A-fib     Renal manifestation of secondary diabetes mellitus     Sleep apnea      Past Surgical History:   Procedure Laterality Date    CATARACT EXTRACTION W/  INTRAOCULAR LENS IMPLANT Left 08/16/2016    Dr. Martinez    CATARACT EXTRACTION W/  INTRAOCULAR LENS IMPLANT Right 08/30/2016    Dr. Martinez    CORONARY STENT PLACEMENT      JOINT REPLACEMENT      both knees    SPINE SURGERY      discectomy    TONSILLECTOMY       Family History   Problem Relation Age of Onset    Dementia Mother     Cataracts Mother     Heart disease Father     Alcohol abuse Brother     Drug abuse Daughter     No Known Problems Son     No Known Problems Daughter     No Known Problems Son     No Known Problems Sister     No Known Problems Maternal Aunt     No Known Problems Maternal Uncle     No Known Problems Paternal Aunt     No Known Problems Paternal Uncle     No Known Problems Maternal Grandmother     No Known Problems Maternal Grandfather     No Known Problems Paternal Grandmother     No Known Problems Paternal Grandfather     Amblyopia Neg Hx     Blindness Neg Hx     Cancer Neg Hx     Diabetes Neg Hx     Glaucoma Neg Hx     Hypertension Neg Hx     Macular degeneration Neg Hx     Retinal detachment Neg Hx     Strabismus Neg Hx     Stroke Neg Hx     Thyroid disease Neg Hx      Social History     Tobacco Use    Smoking status: Never    Smokeless tobacco: Never   Substance Use Topics    Alcohol use: No    Drug use: No     Review of Systems   Constitutional:  Negative for chills, diaphoresis and fever.   HENT:  Negative for ear pain and sore throat.    Eyes:  Negative for pain.   Respiratory:  Negative for cough and shortness  of breath.    Cardiovascular:  Negative for chest pain.   Gastrointestinal:  Positive for abdominal pain (RUQ). Negative for diarrhea, nausea and vomiting.   Genitourinary:  Negative for dysuria.   Musculoskeletal:  Negative for back pain.        (-) Arm or leg trouble.    Skin:  Negative for rash.   Neurological:  Negative for headaches.   Psychiatric/Behavioral:  Negative for confusion.      Physical Exam     Initial Vitals [02/09/23 1050]   BP Pulse Resp Temp SpO2   (!) 157/91 (!) 50 18 98.2 °F (36.8 °C) 98 %      MAP       --         Physical Exam  The patient was examined specifically for the following:   General:No significant distress, Good color, Warm and dry. Head and neck:Scalp atraumatic, Neck supple. Neurological:Appropriate conversation, Gross motor deficits. Eyes:Conjugate gaze, Clear corneas. ENT: No epistaxis. Cardiac: Regular rate and rhythm, Grossly normal heart tones. Pulmonary: Wheezing, Rales. Gastrointestinal: Abdominal tenderness, Abdominal distention. Musculoskeletal: Extremity deformity, Apparent pain with range of motion of the joints. Skin: Rash.   The findings on examination were normal, except for the following:  The patient has an elevated BMI.  There is no abdominal tenderness.  Lungs are clear.  The heart tones are normal.  The abdomen is soft.  ED Course   Procedures  Labs Reviewed   COMPREHENSIVE METABOLIC PANEL - Abnormal; Notable for the following components:       Result Value    Glucose 123 (*)     BUN 25 (*)     Creatinine 2.1 (*)     Total Bilirubin 1.1 (*)     Anion Gap 7 (*)     eGFR 33 (*)     All other components within normal limits   LIPASE - Abnormal; Notable for the following components:    Lipase 62 (*)     All other components within normal limits   CBC W/ AUTO DIFFERENTIAL - Abnormal; Notable for the following components:    RBC 4.38 (*)     Hemoglobin 13.3 (*)     Platelets 136 (*)     Lymph % 15.8 (*)     All other components within normal limits   URINALYSIS,  REFLEX TO URINE CULTURE - Abnormal; Notable for the following components:    Color, UA Colorless (*)     All other components within normal limits    Narrative:     Specimen Source->Urine          Imaging Results              US Abdomen Limited (In process)                      Medications - No data to display  Medical Decision Making:   History:   Old Medical Records: I decided to obtain old medical records.  Clinical Tests:   Lab Tests: Ordered and Reviewed  Radiological Study: Ordered and Reviewed     Given the above this patient presents emergency room with right upper quadrant abdominal pain that comes and goes off and on for a month.  Patient has no pain now.  The abdomen is nontender.  Laboratory evaluation is revealing only for stable chronic renal insufficiency.  There is no white blood count to suggest infection.  There is no significant anemia to suggest bleeding.  Chemistries failed to reveal transaminitis.  The patient has a slightly high bilirubin.  His last bilirubin was the upper limit of normal.  A right upper quadrant abdominal ultrasound is ordered and pending.  The patient remains pain-free.  I am concerned this patient could have cholelithiasis with intermittent biliary colic and that cholecystectomy may be indicated.  The patient will be discharged home likely no matter what the outcome of the ultrasound of the gallbladder.  The case will be supervised to disposition by Dr. Fuentes.       Ebony Attestation:   Floryibe #1: I performed the above scribed service and the documentation accurately describes the services I performed. I attest to the accuracy of the note.                   Clinical Impression:   Final diagnoses:  [R10.11] Right upper quadrant abdominal pain (Primary)  [R10.11] Recurrent right upper quadrant abdominal pain                  I personally performed the services described in this documentation.  All medical record  entries made by the floryibsarwat are at my direction and in my  presence.  Signed, Dr. Amadeo Childs MD  02/09/23 1527       Garcia Childs MD  02/09/23 1556

## 2023-02-09 NOTE — DISCHARGE INSTRUCTIONS
Please return immediately if you get worse or if new problems develop.  Please follow-up with the general surgeon above.  Dicyclomine for pain.  Low-fat diet.

## 2023-02-13 ENCOUNTER — PATIENT MESSAGE (OUTPATIENT)
Dept: ADMINISTRATIVE | Facility: HOSPITAL | Age: 73
End: 2023-02-13
Payer: MEDICARE

## 2023-02-13 NOTE — PROGRESS NOTES
"    [facility] Consultation Note    Reason for Consult:  gastritis     PCP:   Jory Wolf   [address] [facility] / [place] [address]    Referring MD:  Ramos Frederick Md  [address] [facility] [place] [address]    HPI:  This is a 72 y.o. male here for evaluation of abdominal pain. He has a PMHx of CAD, ROSE on CPAP, DM, GERD, CAD on plavix, HTN, HLD, Afib on eliquis. For the past year, he has had episodes of RUQ pain which was sudden onset and sharp and lasted only a few seconds. This weekend, he had a more severe episode of this pain which lasted for hours and was so severe he presented to ED. Blood work showed mild, stable anemia. CMP showed CKD and Bili was 1.1. US showed gallbladder thickening. Lipase was 62, not consistent with pancreatitis. Pain eventually improved. He was discharged with PPI and bentyl but has not started this. Has intermittent reflux sx for which he takes pepcid. This pain episode did not feel like reflux. States he had EGD at [facility] a long time ago which showed his esophagus was "burned up". Took PPI for some time after. No dysphagia, hematemesis or melena. Weight and appetite are stable. He is on ozempic but has not noted worsening reflux or nausea. BM are normal, no blood in stool or straining. Last cscope in 2017 removed three polyps and showed hemorrhoids. Repeat in 3 years. Denied NSAID use. No Fh of CRC, gastric cancer, celiac or IBD.        ROS:  Constitutional: No fevers, chills, No weight loss  ENT: No allergies  CV: No chest pain  Pulm: No cough, No shortness of breath  Ophtho: No vision changes  GI: see HPI  Derm: No rash  Heme: No lymphadenopathy, No bruising  MSK: No arthritis  : No dysuria, No hematuria  Endo: No hot or cold intolerance  Neuro: No syncope, No seizure  Psych: No anxiety, No depression    Medical History:  has a past medical history of Anticoagulant long-term use, Colonic polyp (07/25/2017), Coronary artery disease, CPAP (continuous " positive airway pressure) dependence, Diabetes mellitus type II, Diabetes with neurologic complications, ED (erectile dysfunction), GERD (gastroesophageal reflux disease), Hyperlipidemia, Hypertension, Kidney stones, Kidney stones, Morbidly obese, Paroxysmal A-fib, Renal manifestation of secondary diabetes mellitus, and Sleep apnea.    Surgical History:  has a past surgical history that includes Joint replacement; Spine surgery; Coronary stent placement; Cataract extraction w/  intraocular lens implant (Left, 08/16/2016); Cataract extraction w/  intraocular lens implant (Right, 08/30/2016); and Tonsillectomy.    Family History: family history includes Alcohol abuse in his brother; Cataracts in his mother; Colon cancer in his maternal grandmother; Dementia in his mother; Drug abuse in his daughter; Heart disease in his father; No Known Problems in his daughter, maternal aunt, maternal grandfather, maternal uncle, paternal aunt, paternal grandfather, paternal uncle, sister, son, and son..     Social History:  reports that he has never smoked. He has never used smokeless tobacco. He reports that he does not drink alcohol and does not use drugs.    Review of patient's allergies indicates:   Allergen Reactions    Penicillins Anaphylaxis       Current Outpatient Rx   Medication Sig Dispense Refill    amiodarone (PACERONE) 200 MG Tab Take 1 tablet (200 mg total) by mouth 2 (two) times daily. (Patient taking differently: Take 100 mg by mouth once daily.) 60 tablet 11    atorvastatin (LIPITOR) 40 MG tablet Take 1 tablet (40 mg total) by mouth once daily. 90 tablet 3    blood-glucose meter kit To check BG 2 times daily, to use with insurance preferred meter 1 each 0    clopidogreL (PLAVIX) 75 mg tablet Take 1 tablet (75 mg total) by mouth once daily. 90 tablet 1    ELIQUIS 5 mg Tab Take 1 tablet (5 mg total) by mouth 2 (two) times daily. 180 tablet 3    famotidine (PEPCID) 20 MG tablet Take 20 mg by mouth 2 (two) times daily.  "     famotidine (PEPCID) 40 MG tablet Take 1 tablet (40 mg total) by mouth 2 (two) times daily as needed for Heartburn. 180 tablet 0    lancets (SAFETY LANCETS) 21 gauge Misc 1 lancet by Misc.(Non-Drug; Combo Route) route 2 (two) times daily. 100 each 11    lancets Misc To check BG 2 times daily, to use with insurance preferred meter 100 each 0    lisinopriL (PRINIVIL,ZESTRIL) 5 MG tablet Take 1 tablet (5 mg total) by mouth once daily. 90 tablet 0    nitroGLYCERIN (NITROSTAT) 0.4 MG SL tablet Place 0.4 mg under the tongue 4 (four) times daily as needed.      OZEMPIC 0.25 mg or 0.5 mg(2 mg/1.5 mL) pen injector SMARTSI.25 Milligram(s) SUB-Q Once a Week      sodium bicarbonate 650 MG tablet Take 1 tablet (650 mg total) by mouth 2 (two) times daily. 180 tablet 3    dicyclomine (BENTYL) 20 mg tablet Take 1 tablet (20 mg total) by mouth every 6 (six) hours as needed (Abdominal pain). (Patient not taking: Reported on 2023) 20 tablet 0    pantoprazole (PROTONIX) 40 MG tablet Take 1 tablet (40 mg total) by mouth once daily. (Patient not taking: Reported on 2023) 30 tablet 11       Objective Findings:    Vital Signs:  /69   Pulse (!) 57   Ht 6' 1.2" (1.859 m)   Wt (!) 161.2 kg (355 lb 6.1 oz)   BMI 46.63 kg/m²   Body mass index is 46.63 kg/m².    Physical Exam:  General Appearance: Well appearing in no acute distress  Head:   Normocephalic, without obvious abnormality  Eyes:    No scleral icterus, EOMI  ENT: Neck supple, Lips, mucosa, and tongue normal    Lungs: CTA bilaterally in anterior and posterior fields, no wheezes, no crackles.  Heart:  Regular rate and rhythm, S1, S2 normal, no murmurs heard  Abdomen: Soft, non tender, non distended with positive bowel sounds in all four quadrants. No hepatosplenomegaly, ascites, or mass. Ventral hernia noted which is soft and reducible   Extremities: 2+ pulses, no clubbing, cyanosis or edema  Skin: No rash  Neurologic: no focal deficits       Labs:  Lab " Results   Component Value Date    WBC 6.14 02/09/2023    HGB 13.3 (L) 02/09/2023    HCT 40.5 02/09/2023     (L) 02/09/2023    CHOL 106 (L) 12/27/2022    TRIG 89 12/27/2022    HDL 33 (L) 12/27/2022    ALT 25 02/09/2023    AST 23 02/09/2023     02/09/2023    K 4.6 02/09/2023     02/09/2023    CREATININE 2.1 (H) 02/09/2023    BUN 25 (H) 02/09/2023    CO2 24 02/09/2023    TSH 1.843 12/27/2022    PSA 1.3 12/27/2022    INR 1.0 09/05/2020    HGBA1C 6.1 (H) 07/19/2022       Imaging:    US 2023   Impression:     Cirrhosis and splenomegaly.  No focal hepatic lesions.     Gallbladder wall thickening, nonspecific in the setting of liver disease.  No gallstones or other signs of cholecystitis.    Endoscopy:      Cscope 2017   Three polyps   Hemorrhoids   Repeat in 3 years     Assessment:    Abdominal pain  GERD   History of colon polyps  Hemorrhoids   Afib on Eliquis   CAD on plavix      Patient with recent abdominal pain episode which sounds most consistent with biliary colic. Mild elevation in bilirubin and US with gallbladder thickening but no stones. Plan for HIDA scan. For his history of GERD and prior EGD showing what sounds like severe esophagitis, will plan for EGD with biopsies. Cscope at same time as he is due for CRC surveillance. Mild anemia without overt GI bleeding. Also has CKD which likely contributes to anemia.      Recommendations:    - HIDA scan ordered   - EGD with biopsies   - Cscope for CRC surveillance   - Bentyl PRN   - Continue H2 blocker for now   - GERD precautions       Follow up for after endoscopy .      Order summary:  Orders Placed This Encounter    NM Hepatobiliary Scan W Pharm Intervention    Ambulatory referral/consult to Endo Procedure          Thank you so much for allowing me to participate in the care of Wale Emilia De La Fuente MD  Gastroenterology   [facility]

## 2023-02-14 ENCOUNTER — OFFICE VISIT (OUTPATIENT)
Dept: GASTROENTEROLOGY | Facility: CLINIC | Age: 73
End: 2023-02-14
Payer: MEDICARE

## 2023-02-14 VITALS
HEART RATE: 57 BPM | BODY MASS INDEX: 41.75 KG/M2 | HEIGHT: 73 IN | WEIGHT: 315 LBS | SYSTOLIC BLOOD PRESSURE: 126 MMHG | DIASTOLIC BLOOD PRESSURE: 69 MMHG

## 2023-02-14 DIAGNOSIS — Z12.11 COLON CANCER SCREENING: Primary | ICD-10-CM

## 2023-02-14 DIAGNOSIS — K29.00 ACUTE GASTRITIS, PRESENCE OF BLEEDING UNSPECIFIED, UNSPECIFIED GASTRITIS TYPE: ICD-10-CM

## 2023-02-14 DIAGNOSIS — K80.42 CALCULUS OF BILE DUCT WITH ACUTE CHOLECYSTITIS WITHOUT OBSTRUCTION: ICD-10-CM

## 2023-02-14 PROCEDURE — 99214 OFFICE O/P EST MOD 30 MIN: CPT | Mod: PBBFAC | Performed by: STUDENT IN AN ORGANIZED HEALTH CARE EDUCATION/TRAINING PROGRAM

## 2023-02-14 PROCEDURE — 99204 OFFICE O/P NEW MOD 45 MIN: CPT | Mod: S$PBB,,, | Performed by: STUDENT IN AN ORGANIZED HEALTH CARE EDUCATION/TRAINING PROGRAM

## 2023-02-14 PROCEDURE — 99999 PR PBB SHADOW E&M-EST. PATIENT-LVL IV: CPT | Mod: PBBFAC,,, | Performed by: STUDENT IN AN ORGANIZED HEALTH CARE EDUCATION/TRAINING PROGRAM

## 2023-02-14 PROCEDURE — 99204 PR OFFICE/OUTPT VISIT, NEW, LEVL IV, 45-59 MIN: ICD-10-PCS | Mod: S$PBB,,, | Performed by: STUDENT IN AN ORGANIZED HEALTH CARE EDUCATION/TRAINING PROGRAM

## 2023-02-14 PROCEDURE — 99999 PR PBB SHADOW E&M-EST. PATIENT-LVL IV: ICD-10-PCS | Mod: PBBFAC,,, | Performed by: STUDENT IN AN ORGANIZED HEALTH CARE EDUCATION/TRAINING PROGRAM

## 2023-02-14 RX ORDER — FAMOTIDINE 20 MG/1
20 TABLET, FILM COATED ORAL 2 TIMES DAILY
COMMUNITY
End: 2023-09-05

## 2023-02-14 NOTE — PATIENT INSTRUCTIONS
Avoid eating 3-4 hours before bed     Coffee, caffeine, chocolate, peppermint and alcohol are common reflux triggers

## 2023-02-15 ENCOUNTER — PATIENT MESSAGE (OUTPATIENT)
Dept: NEPHROLOGY | Facility: CLINIC | Age: 73
End: 2023-02-15
Payer: MEDICARE

## 2023-03-03 DIAGNOSIS — I25.10 CORONARY ARTERY DISEASE INVOLVING NATIVE CORONARY ARTERY OF NATIVE HEART WITHOUT ANGINA PECTORIS: ICD-10-CM

## 2023-03-03 NOTE — TELEPHONE ENCOUNTER
Last Office Visit Info:   The patient's last visit with Jory Wolf MD was on 12/22/2022.    The patient's last visit in current department was on 12/22/2022.        Last CBC Results:   Lab Results   Component Value Date    WBC 6.14 02/09/2023    HGB 13.3 (L) 02/09/2023    HCT 40.5 02/09/2023     (L) 02/09/2023       Last CMP Results  Lab Results   Component Value Date     02/09/2023    K 4.6 02/09/2023     02/09/2023    CO2 24 02/09/2023    BUN 25 (H) 02/09/2023    CREATININE 2.1 (H) 02/09/2023    CALCIUM 9.1 02/09/2023    ALBUMIN 4.1 02/09/2023    AST 23 02/09/2023    ALT 25 02/09/2023       Last Lipids  Lab Results   Component Value Date    CHOL 106 (L) 12/27/2022    TRIG 89 12/27/2022    HDL 33 (L) 12/27/2022    LDLCALC 55.2 (L) 12/27/2022       Last A1C  Lab Results   Component Value Date    HGBA1C 6.1 (H) 07/19/2022       Last TSH  Lab Results   Component Value Date    TSH 1.843 12/27/2022             Current Med Refills  Medication List with Changes/Refills   Current Medications    AMIODARONE (PACERONE) 200 MG TAB    Take 1 tablet (200 mg total) by mouth 2 (two) times daily.       Start Date: 12/22/2022End Date: --    ATORVASTATIN (LIPITOR) 40 MG TABLET    Take 1 tablet (40 mg total) by mouth once daily.       Start Date: 12/22/2022End Date: 12/22/2023    BLOOD-GLUCOSE METER KIT    To check BG 2 times daily, to use with insurance preferred meter       Start Date: 12/15/2019End Date: 10/8/2023    CLOPIDOGREL (PLAVIX) 75 MG TABLET    Take 1 tablet (75 mg total) by mouth once daily.       Start Date: 12/22/2022End Date: --    DICYCLOMINE (BENTYL) 20 MG TABLET    Take 1 tablet (20 mg total) by mouth every 6 (six) hours as needed (Abdominal pain).       Start Date: 2/9/2023  End Date: 3/11/2023    ELIQUIS 5 MG TAB    Take 1 tablet (5 mg total) by mouth 2 (two) times daily.       Start Date: 1/25/2023 End Date: 1/25/2024    FAMOTIDINE (PEPCID) 20 MG TABLET    Take 20 mg by mouth 2 (two) times  daily.       Start Date: --        End Date: --    FAMOTIDINE (PEPCID) 40 MG TABLET    Take 1 tablet (40 mg total) by mouth 2 (two) times daily as needed for Heartburn.       Start Date: 2022End Date: --    LANCETS (SAFETY LANCETS) 21 GAUGE MISC    1 lancet by Misc.(Non-Drug; Combo Route) route 2 (two) times daily.       Start Date: 2019 End Date: --    LANCETS MISC    To check BG 2 times daily, to use with insurance preferred meter       Start Date: 12/15/2019End Date: --    LISINOPRIL (PRINIVIL,ZESTRIL) 5 MG TABLET    Take 1 tablet (5 mg total) by mouth once daily.       Start Date: 2022End Date: --    NITROGLYCERIN (NITROSTAT) 0.4 MG SL TABLET    Place 0.4 mg under the tongue 4 (four) times daily as needed.       Start Date: 2021  End Date: --    OZEMPIC 0.25 MG OR 0.5 MG(2 MG/1.5 ML) PEN INJECTOR    SMARTSI.25 Milligram(s) SUB-Q Once a Week       Start Date: 2022End Date: --    PANTOPRAZOLE (PROTONIX) 40 MG TABLET    Take 1 tablet (40 mg total) by mouth once daily.       Start Date: 2023  End Date: 2024    SODIUM BICARBONATE 650 MG TABLET    Take 1 tablet (650 mg total) by mouth 2 (two) times daily.       Start Date: 2023  End Date: 2024       Order(s) placed per written order guidelines: none    Please advise.

## 2023-03-06 RX ORDER — CLOPIDOGREL BISULFATE 75 MG/1
75 TABLET ORAL DAILY
Qty: 90 TABLET | Refills: 1 | Status: SHIPPED | OUTPATIENT
Start: 2023-03-06 | End: 2023-09-05 | Stop reason: SDUPTHER

## 2023-03-08 DIAGNOSIS — K21.9 GASTROESOPHAGEAL REFLUX DISEASE WITHOUT ESOPHAGITIS: ICD-10-CM

## 2023-03-08 NOTE — TELEPHONE ENCOUNTER
----- Message from Markel Reyes sent at 3/8/2023  3:54 PM CST -----  Regarding: Self [phone]  Type: RX Refill Request    Who Called:  Self     Have you contacted your pharmacy: no     Refill    RX Name and Strength: famotidine (PEPCID) 20 MG tablet    Preferred Pharmacy with phone number:   [facility] #[id number] - [address] AT [address] [address]  Phone: [phone] Fax: [phone]      Local or Mail Order: local     Would the patient rather a call back or a response [facility]? Call back     Best Call Back Number: [phone]     Additional Information:     Thank you.

## 2023-03-10 RX ORDER — FAMOTIDINE 40 MG/1
40 TABLET, FILM COATED ORAL 2 TIMES DAILY PRN
Qty: 180 TABLET | Refills: 0 | Status: SHIPPED | OUTPATIENT
Start: 2023-03-10 | End: 2023-07-25 | Stop reason: SDUPTHER

## 2023-03-23 ENCOUNTER — PATIENT MESSAGE (OUTPATIENT)
Dept: ADMINISTRATIVE | Facility: HOSPITAL | Age: 73
End: 2023-03-23
Payer: MEDICARE

## 2023-04-05 DIAGNOSIS — I10 ESSENTIAL HYPERTENSION: ICD-10-CM

## 2023-04-05 NOTE — TELEPHONE ENCOUNTER
Last Office Visit Info:   The patient's last visit with Jory Wolf MD was on 12/22/2022.    The patient's last visit in current department was on 12/22/2022.        Last CBC Results:   Lab Results   Component Value Date    WBC 6.14 02/09/2023    HGB 13.3 (L) 02/09/2023    HCT 40.5 02/09/2023     (L) 02/09/2023       Last CMP Results  Lab Results   Component Value Date     02/09/2023    K 4.6 02/09/2023     02/09/2023    CO2 24 02/09/2023    BUN 25 (H) 02/09/2023    CREATININE 2.1 (H) 02/09/2023    CALCIUM 9.1 02/09/2023    ALBUMIN 4.1 02/09/2023    AST 23 02/09/2023    ALT 25 02/09/2023       Last Lipids  Lab Results   Component Value Date    CHOL 106 (L) 12/27/2022    TRIG 89 12/27/2022    HDL 33 (L) 12/27/2022    LDLCALC 55.2 (L) 12/27/2022       Last A1C  Lab Results   Component Value Date    HGBA1C 6.1 (H) 07/19/2022       Last TSH  Lab Results   Component Value Date    TSH 1.843 12/27/2022             Current Med Refills  Medication List with Changes/Refills   Current Medications    AMIODARONE (PACERONE) 200 MG TAB    Take 1 tablet (200 mg total) by mouth 2 (two) times daily.       Start Date: 12/22/2022End Date: --    ATORVASTATIN (LIPITOR) 40 MG TABLET    Take 1 tablet (40 mg total) by mouth once daily.       Start Date: 12/22/2022End Date: 12/22/2023    BLOOD-GLUCOSE METER KIT    To check BG 2 times daily, to use with insurance preferred meter       Start Date: 12/15/2019End Date: 10/8/2023    CLOPIDOGREL (PLAVIX) 75 MG TABLET    Take 1 tablet (75 mg total) by mouth once daily.       Start Date: 3/6/2023  End Date: --    ELIQUIS 5 MG TAB    Take 1 tablet (5 mg total) by mouth 2 (two) times daily.       Start Date: 1/25/2023 End Date: 1/25/2024    FAMOTIDINE (PEPCID) 20 MG TABLET    Take 20 mg by mouth 2 (two) times daily.       Start Date: --        End Date: --    FAMOTIDINE (PEPCID) 40 MG TABLET    Take 1 tablet (40 mg total) by mouth 2 (two) times daily as needed for Heartburn.        Start Date: 3/10/2023 End Date: --    LANCETS (SAFETY LANCETS) 21 GAUGE MISC    1 lancet by Misc.(Non-Drug; Combo Route) route 2 (two) times daily.       Start Date: 2019 End Date: --    LANCETS MISC    To check BG 2 times daily, to use with insurance preferred meter       Start Date: 12/15/2019End Date: --    LISINOPRIL (PRINIVIL,ZESTRIL) 5 MG TABLET    Take 1 tablet (5 mg total) by mouth once daily.       Start Date: 2022End Date: --    NITROGLYCERIN (NITROSTAT) 0.4 MG SL TABLET    Place 0.4 mg under the tongue 4 (four) times daily as needed.       Start Date: 2021  End Date: --    OZEMPIC 0.25 MG OR 0.5 MG(2 MG/1.5 ML) PEN INJECTOR    SMARTSI.25 Milligram(s) SUB-Q Once a Week       Start Date: 2022End Date: --    PANTOPRAZOLE (PROTONIX) 40 MG TABLET    Take 1 tablet (40 mg total) by mouth once daily.       Start Date: 2023  End Date: 2024    SODIUM BICARBONATE 650 MG TABLET    Take 1 tablet (650 mg total) by mouth 2 (two) times daily.       Start Date: 2023  End Date: 2024       Order(s) placed per written order guidelines: none    Please advise.

## 2023-04-05 NOTE — TELEPHONE ENCOUNTER
----- Message from Vashti Zhang sent at 4/5/2023 12:47 PM CDT -----  .Type: RX Refill Request    Who Called: Self    Have you contacted your pharmacy: No     Refill or New Rx: Refill    RX Name and Strength:lisinopriL (PRINIVIL,ZESTRIL) 5 MG tablet    Preferred Pharmacy with phone number:.  [facility] #[id number] - [address] AT [address] [address]  Phone: [phone] Fax: [phone]        Local or Mail Order:Local     Ordering Provider: Luciano    Would the patient rather a call back or a response via [facility]? Call Back     Best Call Back Number: .[phone] (home)       Additional Information:

## 2023-04-06 ENCOUNTER — PATIENT OUTREACH (OUTPATIENT)
Dept: ADMINISTRATIVE | Facility: HOSPITAL | Age: 73
End: 2023-04-06
Payer: MEDICARE

## 2023-04-06 RX ORDER — LISINOPRIL 5 MG/1
5 TABLET ORAL DAILY
Qty: 90 TABLET | Refills: 0 | Status: SHIPPED | OUTPATIENT
Start: 2023-04-06 | End: 2023-07-05 | Stop reason: SDUPTHER

## 2023-04-06 NOTE — PROGRESS NOTES
HM and immunization's reviewed and updated. Due for eye exam, labs and visit. Pt will schedule when ready.

## 2023-04-12 ENCOUNTER — PATIENT MESSAGE (OUTPATIENT)
Dept: ADMINISTRATIVE | Facility: HOSPITAL | Age: 73
End: 2023-04-12
Payer: MEDICARE

## 2023-04-19 ENCOUNTER — PATIENT MESSAGE (OUTPATIENT)
Dept: ADMINISTRATIVE | Facility: HOSPITAL | Age: 73
End: 2023-04-19
Payer: MEDICARE

## 2023-06-15 ENCOUNTER — CLINICAL SUPPORT (OUTPATIENT)
Dept: ENDOSCOPY | Facility: HOSPITAL | Age: 73
End: 2023-06-15
Attending: STUDENT IN AN ORGANIZED HEALTH CARE EDUCATION/TRAINING PROGRAM
Payer: MEDICARE

## 2023-06-15 VITALS — HEIGHT: 73 IN | WEIGHT: 315 LBS | BODY MASS INDEX: 41.75 KG/M2

## 2023-06-15 DIAGNOSIS — K29.00 ACUTE GASTRITIS, PRESENCE OF BLEEDING UNSPECIFIED, UNSPECIFIED GASTRITIS TYPE: ICD-10-CM

## 2023-06-15 DIAGNOSIS — Z12.11 COLON CANCER SCREENING: ICD-10-CM

## 2023-06-15 RX ORDER — POLYETHYLENE GLYCOL 3350, SODIUM SULFATE ANHYDROUS, SODIUM BICARBONATE, SODIUM CHLORIDE, POTASSIUM CHLORIDE 236; 22.74; 6.74; 5.86; 2.97 G/4L; G/4L; G/4L; G/4L; G/4L
4 POWDER, FOR SOLUTION ORAL ONCE
Qty: 4000 ML | Refills: 0 | Status: SHIPPED | OUTPATIENT
Start: 2023-06-15 | End: 2023-06-15

## 2023-07-05 DIAGNOSIS — I10 ESSENTIAL HYPERTENSION: ICD-10-CM

## 2023-07-05 RX ORDER — LISINOPRIL 5 MG/1
5 TABLET ORAL DAILY
Qty: 45 TABLET | Refills: 0 | Status: SHIPPED | OUTPATIENT
Start: 2023-07-05 | End: 2023-08-29 | Stop reason: SDUPTHER

## 2023-07-05 NOTE — TELEPHONE ENCOUNTER
----- Message from Lm Lieberman sent at 7/5/2023 10:43 AM CDT -----  Can the clinic reply in [facility]: NO              Please refill the medication(s) listed below. Please call the patient when the prescription(s) is ready for  at this phone number   [phone]           Medication #1lisinopriL (PRINIVIL,ZESTRIL) 5 MG tablet         Medication #2           Preferred Pharmacy:[facility] #[id number]  [place] [address] [address] AT [facility] [address]   Phone:  [phone]  Fax:  [phone]

## 2023-07-05 NOTE — TELEPHONE ENCOUNTER
Pt advised that an office visit is due. Pt states he does not need to come in the doctor just wants the money. Pt schedule and advised provider will send temp refill to last until his scheduled visit.

## 2023-07-05 NOTE — ADDENDUM NOTE
Addended by: CURRY HARVEY on: 7/5/2023 11:38 AM     Modules accepted: Orders     Labs/Imaging Studies

## 2023-07-10 ENCOUNTER — TELEPHONE (OUTPATIENT)
Dept: ENDOSCOPY | Facility: HOSPITAL | Age: 73
End: 2023-07-10
Payer: MEDICARE

## 2023-07-10 NOTE — TELEPHONE ENCOUNTER
Patient has been approved to hold Eliquis (apixaban) for 2 days day(s) and Plavix 5 days per Dr. Esteban-scanned into chart.

## 2023-07-10 NOTE — TELEPHONE ENCOUNTER
----- Message from Jaquelin Barnes RN sent at 6/15/2023  3:42 PM CDT -----  Regarding: scheduled for EGD and Colon on 7'28'23 at [facility] [place] [place]  The patient is currently under an external cardiologist Luis lawrence and requires a blood thinner Eliquis (apixaban) for their upcoming scheduled Colonoscopy/EGD on 7/28/23.     Additional clearances required:  external   cardiologist   blood thinner   Plavix (clopidogrel)      External provider information:    Physician name: Luis Del Rio MD  Facility/Location: [facility]  Phone number: [phone]    Notes: Pt. Also needs Cardiac Clearance. Appt. Is on 6/29/23

## 2023-07-12 ENCOUNTER — PATIENT OUTREACH (OUTPATIENT)
Dept: ADMINISTRATIVE | Facility: HOSPITAL | Age: 73
End: 2023-07-12
Payer: MEDICARE

## 2023-07-12 DIAGNOSIS — E11.9 TYPE 2 DIABETES MELLITUS WITHOUT COMPLICATION, UNSPECIFIED WHETHER LONG TERM INSULIN USE: Primary | ICD-10-CM

## 2023-07-18 ENCOUNTER — LAB VISIT (OUTPATIENT)
Dept: LAB | Facility: HOSPITAL | Age: 73
End: 2023-07-18
Attending: STUDENT IN AN ORGANIZED HEALTH CARE EDUCATION/TRAINING PROGRAM
Payer: MEDICARE

## 2023-07-18 DIAGNOSIS — E11.9 TYPE 2 DIABETES MELLITUS WITHOUT COMPLICATION: ICD-10-CM

## 2023-07-18 LAB
ESTIMATED AVG GLUCOSE: 166 MG/DL (ref 68–131)
HBA1C MFR BLD: 7.4 % (ref 4–5.6)

## 2023-07-18 PROCEDURE — 83036 HEMOGLOBIN GLYCOSYLATED A1C: CPT | Performed by: STUDENT IN AN ORGANIZED HEALTH CARE EDUCATION/TRAINING PROGRAM

## 2023-07-18 PROCEDURE — 36415 COLL VENOUS BLD VENIPUNCTURE: CPT | Mod: PO | Performed by: STUDENT IN AN ORGANIZED HEALTH CARE EDUCATION/TRAINING PROGRAM

## 2023-07-25 ENCOUNTER — TELEPHONE (OUTPATIENT)
Dept: ENDOSCOPY | Facility: HOSPITAL | Age: 73
End: 2023-07-25
Payer: MEDICARE

## 2023-07-25 DIAGNOSIS — K21.9 GASTROESOPHAGEAL REFLUX DISEASE WITHOUT ESOPHAGITIS: ICD-10-CM

## 2023-07-25 DIAGNOSIS — Z12.11 COLON CANCER SCREENING: Primary | ICD-10-CM

## 2023-07-25 RX ORDER — FAMOTIDINE 40 MG/1
40 TABLET, FILM COATED ORAL 2 TIMES DAILY PRN
Qty: 180 TABLET | Refills: 0 | Status: SHIPPED | OUTPATIENT
Start: 2023-07-25 | End: 2024-01-04 | Stop reason: SDUPTHER

## 2023-07-25 NOTE — TELEPHONE ENCOUNTER
----- Message from Joi Toledo sent at 7/25/2023  1:14 PM CDT -----  Regarding: self [phone]  Type: RX Refill Request    Who Called:  self     Have you contacted your pharmacy: yes    Refill or New Rx: refill     RX Name and Strength:famotidine (PEPCID) 40 MG tablet      Preferred Pharmacy with phone number:   [facility] #[id number] - [place] [address] AT [address]  [place] [address]  Phone: [phone] Fax: [phone]      Local or Mail Order: local   Would the patient rather a call back or a response via [facility]? Call back     Best Call Back Number: [phone]

## 2023-07-27 ENCOUNTER — TELEPHONE (OUTPATIENT)
Dept: ENDOSCOPY | Facility: HOSPITAL | Age: 73
End: 2023-07-27
Payer: MEDICARE

## 2023-07-28 ENCOUNTER — TELEPHONE (OUTPATIENT)
Dept: ENDOSCOPY | Facility: HOSPITAL | Age: 73
End: 2023-07-28
Payer: MEDICARE

## 2023-08-09 NOTE — PROGRESS NOTES
Additional Care Coordinator Notes:     HM and immunization's reviewed and updated.  Eye exam schedule with provider.     Further Action Needed If Patient Returns Outreach:    Population Health Chart Review & Patient Outreach Details:     Reason for Outreach Encounter:     [x]  Non-Compliant Report   []  Payor Report ([employer], PHN, BCBS, MSSP, MCIP, UHC, etc.)   []  Pre-Visit Chart Review     Updates Requested / Reviewed:     [x]  Care Everywhere    []     []  External Sources ([facility], [facility], DIS, etc.)   []  Care Team Updated    Patient Outreach Method:    [x]  Telephone Outreach Completed   [x] Successful   [] Left Voicemail   [] Unable to Contact (wrong number, no voicemail)  []  [facility] Outreach Sent  []  Letter Outreach Mailed  []  Fax Sent for External Records  []  External Records Upload    Health Maintenance Topics Addressed and Outreach Outcomes / Actions Taken:        []      Breast Cancer Screening []  Mammo Scheduled      []  External Records Requested     []  Added Reminder to Complete to Upcoming Primary Care Appt Notes     []  Patient Declined     []  Patient Will Call Back to Schedule     []  Patient Will Schedule with External Provider / Order Routed if Applicable             []       Cervical Cancer Screening []  Pap Scheduled      []  External Records Requested     []  Added Reminder to Complete to Upcoming Primary Care Appt Notes     []  Patient Declined     []  Patient Will Call Back to Schedule     []  Patient Will Schedule with External Provider               []          Colorectal Cancer Screening []  Colonoscopy Case Request or Referral Placed     []  External Records Requested     []  Added Reminder to Complete to Upcoming Primary Care Appt Notes     []  Patient Declined     []  Patient Will Call Back to Schedule     []  Patient Will Schedule with External Provider     []  Fit Kit Mailed (add the SmartPhrase under additional notes)     [] PAT Scheduled      [] Cologuard  orders placed.      []  Reminded Patient to Complete Home Test             [x]      Diabetic Eye Exam []  Eye Camera Scheduled or Optometry Referral Placed     []  External Records Requested     []  Added Reminder to Complete to Upcoming Primary Care Appt Notes     []  Patient Declined     []  Patient Will Call Back to Schedule     []  Patient Will Schedule with External Provider             []      Blood Pressure Control []  Primary Care Follow Up Visit Scheduled     []  Remote Blood Pressure Reading Captured     []  Added Reminder to Complete to Upcoming Primary Care Appt Notes     []  Patient Declined     []  Patient Will Call Back / Patient Will Send Portal Message with Reading     []  Patient Will Call Back to Schedule Provider Visit             []       HbA1c & Other Labs []  Lab Appt Scheduled for Due Labs     []  Primary Care Follow Up Visit Scheduled      []  Reminded Patient to Complete Home Test     []  Added Reminder to Complete to Upcoming Primary Care Appt Notes     []  Patient Declined     []  Patient Will Call Back to Schedule     []  Patient Will Schedule with External Provider / Order Routed if Applicable           []    Schedule Primary Care Appt []  Primary Care Appt Scheduled     []  Patient Declined     []  Patient Will Call Back to Schedule     []  Pt Established with External Provider & Updated Care Team             []      Medication Adherence []  Primary Care Appointment Scheduled     []  Added Reminder to Upcoming Primary Care Appt Notes     []  Patient Reminded to  Prescription     []  Patient Declined, Provider Notified if Needed     []  Sent Provider Message to Review and/or Add Exclusion to Problem List             []      Osteoporosis Screening []  DXA Appointment Scheduled     []  External Records Requested     []  Added Reminder to Complete to Upcoming Primary Care Appt Notes     []  Patient Declined     []  Patient Will Call Back to Schedule     []  Patient Will Schedule  with External Provider / Order Routed if Applicable

## 2023-08-29 DIAGNOSIS — I10 ESSENTIAL HYPERTENSION: ICD-10-CM

## 2023-08-29 RX ORDER — LISINOPRIL 5 MG/1
5 TABLET ORAL DAILY
Qty: 45 TABLET | Refills: 0 | Status: SHIPPED | OUTPATIENT
Start: 2023-08-29 | End: 2023-10-16 | Stop reason: SDUPTHER

## 2023-08-29 NOTE — TELEPHONE ENCOUNTER
----- Message from Aleah Mcdowell sent at 8/29/2023  8:44 AM CDT -----  Type: RX Refill Request    Who Called: Self    Have you contacted your pharmacy:No    Refill or New Rx:Refill    RX Name and Strength:lisinopriL (PRINIVIL,ZESTRIL) 5 MG tablet    Preferred Pharmacy with phone number:[facility] #[id number] - [address] AT [facility] OF [facility] [address]   Phone:  [phone]  Fax:  [phone]          Local or Mail Order:Local    Would the patient rather a call back or a response via [facility]? Call    Best Call Back Number:.[phone]     Additional Information:

## 2023-08-29 NOTE — TELEPHONE ENCOUNTER
Last Office Visit Info:   The patient's last visit with Jory Wolf MD was on 12/22/2022.    The patient's last visit in current department was on Visit date not found.        Last CBC Results:   Lab Results   Component Value Date    WBC 6.14 02/09/2023    HGB 13.3 (L) 02/09/2023    HCT 40.5 02/09/2023     (L) 02/09/2023       Last CMP Results  Lab Results   Component Value Date     02/09/2023    K 4.6 02/09/2023     02/09/2023    CO2 24 02/09/2023    BUN 25 (H) 02/09/2023    CREATININE 2.1 (H) 02/09/2023    CALCIUM 9.1 02/09/2023    ALBUMIN 4.1 02/09/2023    AST 23 02/09/2023    ALT 25 02/09/2023       Last Lipids  Lab Results   Component Value Date    CHOL 106 (L) 12/27/2022    TRIG 89 12/27/2022    HDL 33 (L) 12/27/2022    LDLCALC 55.2 (L) 12/27/2022       Last A1C  Lab Results   Component Value Date    HGBA1C 7.4 (H) 07/18/2023       Last TSH  Lab Results   Component Value Date    TSH 1.843 12/27/2022             Current Med Refills  Medication List with Changes/Refills   Current Medications    AMIODARONE (PACERONE) 200 MG TAB    Take 1 tablet (200 mg total) by mouth 2 (two) times daily.       Start Date: 12/22/2022End Date: --    ATORVASTATIN (LIPITOR) 40 MG TABLET    Take 1 tablet (40 mg total) by mouth once daily.       Start Date: 12/22/2022End Date: 12/22/2023    BLOOD-GLUCOSE METER KIT    To check BG 2 times daily, to use with insurance preferred meter       Start Date: 12/15/2019End Date: 10/8/2023    CLOPIDOGREL (PLAVIX) 75 MG TABLET    Take 1 tablet (75 mg total) by mouth once daily.       Start Date: 3/6/2023  End Date: --    ELIQUIS 5 MG TAB    Take 1 tablet (5 mg total) by mouth 2 (two) times daily.       Start Date: 1/25/2023 End Date: 1/25/2024    FAMOTIDINE (PEPCID) 20 MG TABLET    Take 20 mg by mouth 2 (two) times daily.       Start Date: --        End Date: --    FAMOTIDINE (PEPCID) 40 MG TABLET    Take 1 tablet (40 mg total) by mouth 2 (two) times daily as needed for  Heartburn.       Start Date: 2023 End Date: --    LANCETS (SAFETY LANCETS) 21 GAUGE MISC    1 lancet by Misc.(Non-Drug; Combo Route) route 2 (two) times daily.       Start Date: 2019 End Date: --    LANCETS MISC    To check BG 2 times daily, to use with insurance preferred meter       Start Date: 12/15/2019End Date: --    LISINOPRIL (PRINIVIL,ZESTRIL) 5 MG TABLET    Take 1 tablet (5 mg total) by mouth once daily.       Start Date: 2023  End Date: --    NITROGLYCERIN (NITROSTAT) 0.4 MG SL TABLET    Place 0.4 mg under the tongue 4 (four) times daily as needed.       Start Date: 2021  End Date: --    OZEMPIC 0.25 MG OR 0.5 MG(2 MG/1.5 ML) PEN INJECTOR    SMARTSI.25 Milligram(s) SUB-Q Once a Week       Start Date: 2022End Date: --    PANTOPRAZOLE (PROTONIX) 40 MG TABLET    Take 1 tablet (40 mg total) by mouth once daily.       Start Date: 2023  End Date: 2024    SODIUM BICARBONATE 650 MG TABLET    Take 1 tablet (650 mg total) by mouth 2 (two) times daily.       Start Date: 2023  End Date: 2024       Order(s) placed per written order guidelines: none    Please advise.

## 2023-09-05 ENCOUNTER — OFFICE VISIT (OUTPATIENT)
Dept: FAMILY MEDICINE | Facility: CLINIC | Age: 73
End: 2023-09-05
Payer: MEDICARE

## 2023-09-05 VITALS
TEMPERATURE: 98 F | OXYGEN SATURATION: 96 % | HEART RATE: 63 BPM | WEIGHT: 315 LBS | HEIGHT: 73 IN | BODY MASS INDEX: 41.75 KG/M2 | RESPIRATION RATE: 18 BRPM | SYSTOLIC BLOOD PRESSURE: 134 MMHG | DIASTOLIC BLOOD PRESSURE: 70 MMHG

## 2023-09-05 DIAGNOSIS — I25.119 ATHEROSCLEROSIS OF NATIVE CORONARY ARTERY OF NATIVE HEART WITH ANGINA PECTORIS: ICD-10-CM

## 2023-09-05 DIAGNOSIS — I10 ESSENTIAL HYPERTENSION: Primary | ICD-10-CM

## 2023-09-05 DIAGNOSIS — G47.33 OBSTRUCTIVE SLEEP APNEA SYNDROME: ICD-10-CM

## 2023-09-05 DIAGNOSIS — D69.6 THROMBOCYTOPENIA, UNSPECIFIED: ICD-10-CM

## 2023-09-05 DIAGNOSIS — N18.32 TYPE 2 DIABETES MELLITUS WITH STAGE 3B CHRONIC KIDNEY DISEASE, WITHOUT LONG-TERM CURRENT USE OF INSULIN: ICD-10-CM

## 2023-09-05 DIAGNOSIS — E11.22 TYPE 2 DIABETES MELLITUS WITH STAGE 3B CHRONIC KIDNEY DISEASE, WITHOUT LONG-TERM CURRENT USE OF INSULIN: ICD-10-CM

## 2023-09-05 DIAGNOSIS — I70.0 AORTIC ATHEROSCLEROSIS: ICD-10-CM

## 2023-09-05 DIAGNOSIS — I25.10 CORONARY ARTERY DISEASE INVOLVING NATIVE CORONARY ARTERY OF NATIVE HEART WITHOUT ANGINA PECTORIS: ICD-10-CM

## 2023-09-05 PROCEDURE — 99214 OFFICE O/P EST MOD 30 MIN: CPT | Mod: PBBFAC,PO | Performed by: STUDENT IN AN ORGANIZED HEALTH CARE EDUCATION/TRAINING PROGRAM

## 2023-09-05 PROCEDURE — 99999 PR PBB SHADOW E&M-EST. PATIENT-LVL IV: CPT | Mod: PBBFAC,,, | Performed by: STUDENT IN AN ORGANIZED HEALTH CARE EDUCATION/TRAINING PROGRAM

## 2023-09-05 PROCEDURE — 99214 OFFICE O/P EST MOD 30 MIN: CPT | Mod: S$PBB,,, | Performed by: STUDENT IN AN ORGANIZED HEALTH CARE EDUCATION/TRAINING PROGRAM

## 2023-09-05 PROCEDURE — 99999 PR PBB SHADOW E&M-EST. PATIENT-LVL IV: ICD-10-PCS | Mod: PBBFAC,,, | Performed by: STUDENT IN AN ORGANIZED HEALTH CARE EDUCATION/TRAINING PROGRAM

## 2023-09-05 PROCEDURE — 99214 PR OFFICE/OUTPT VISIT, EST, LEVL IV, 30-39 MIN: ICD-10-PCS | Mod: S$PBB,,, | Performed by: STUDENT IN AN ORGANIZED HEALTH CARE EDUCATION/TRAINING PROGRAM

## 2023-09-05 RX ORDER — CLOPIDOGREL BISULFATE 75 MG/1
75 TABLET ORAL DAILY
Qty: 90 TABLET | Refills: 1 | Status: SHIPPED | OUTPATIENT
Start: 2023-09-05 | End: 2024-03-20 | Stop reason: SDUPTHER

## 2023-09-05 NOTE — PROGRESS NOTES
SUBJECTIVE     Chief Complaint   Patient presents with    Follow-up       HPI  [facility] Umesh is a 72 y.o. male with   HTN, CAD, HLD, afib, T2DM since 2013, ROSE sleeps CPAP, nephrolithiasis, CKD3B that presents for follow-up of chronic medical conditions.     Pt reports he is overall doing well without complaint. BP well controlled, A1C 7.4 at last check. Pt just started Ozempic at 0.5, second dose. Tolerating well.       PAST MEDICAL HISTORY:  Past Medical History:   Diagnosis Date    Anticoagulant long-term use     Eliquis    Colonic polyp 07/25/2017    Coronary artery disease     CPAP (continuous positive airway pressure) dependence     Diabetes mellitus type II     Diabetes with neurologic complications     ED (erectile dysfunction)     GERD (gastroesophageal reflux disease)     Hyperlipidemia     Hypertension     Kidney stones     Kidney stones     Morbidly obese     Paroxysmal A-fib     Renal manifestation of secondary diabetes mellitus     Sleep apnea        ALLERGIES AND MEDICATIONS: updated and reviewed.  Review of patient's allergies indicates:   Allergen Reactions    Penicillins Anaphylaxis     Current Outpatient Medications   Medication Sig Dispense Refill    amiodarone (PACERONE) 200 MG Tab Take 1 tablet (200 mg total) by mouth 2 (two) times daily. (Patient taking differently: Take 100 mg by mouth once daily.) 60 tablet 11    atorvastatin (LIPITOR) 40 MG tablet Take 1 tablet (40 mg total) by mouth once daily. 90 tablet 3    blood-glucose meter kit To check BG 2 times daily, to use with insurance preferred meter 1 each 0    ELIQUIS 5 mg Tab Take 1 tablet (5 mg total) by mouth 2 (two) times daily. 180 tablet 3    famotidine (PEPCID) 40 MG tablet Take 1 tablet (40 mg total) by mouth 2 (two) times daily as needed for Heartburn. 180 tablet 0    lancets (SAFETY LANCETS) 21 gauge Misc 1 lancet by Misc.(Non-Drug; Combo Route) route 2 (two) times daily. 100 each 11    lancets Misc To check BG 2 times  "daily, to use with insurance preferred meter 100 each 0    lisinopriL (PRINIVIL,ZESTRIL) 5 MG tablet Take 1 tablet (5 mg total) by mouth once daily. 45 tablet 0    nitroGLYCERIN (NITROSTAT) 0.4 MG SL tablet Place 0.4 mg under the tongue 4 (four) times daily as needed.      OZEMPIC 0.25 mg or 0.5 mg(2 mg/1.5 mL) pen injector SMARTSI.25 Milligram(s) SUB-Q Once a Week      pantoprazole (PROTONIX) 40 MG tablet Take 1 tablet (40 mg total) by mouth once daily. 30 tablet 11    sodium bicarbonate 650 MG tablet Take 1 tablet (650 mg total) by mouth 2 (two) times daily. 180 tablet 3    clopidogreL (PLAVIX) 75 mg tablet Take 1 tablet (75 mg total) by mouth once daily. 90 tablet 1     No current facility-administered medications for this visit.       ROS  Review of Systems   Constitutional:  Negative for chills, fatigue and fever.   HENT:  Negative for rhinorrhea and sore throat.    Respiratory:  Negative for cough and shortness of breath.    Cardiovascular:  Negative for chest pain and palpitations.   Gastrointestinal:  Negative for constipation, diarrhea, nausea and vomiting.   Genitourinary:  Negative for dysuria.   Musculoskeletal:  Negative for joint swelling.   Skin:  Negative for rash and wound.   Neurological:  Negative for light-headedness and headaches.   Psychiatric/Behavioral:  Negative for dysphoric mood and sleep disturbance. The patient is not nervous/anxious.          OBJECTIVE     Physical Exam  Vitals:    23 1432   BP: 134/70   Pulse: 63   Resp: 18   Temp: 97.9 °F (36.6 °C)    Body mass index is 48.02 kg/m².  Weight: (!) 165.1 kg (363 lb 15.7 oz)   Height: 6' 1" (185.4 cm)     Physical Exam  Vitals reviewed.   Constitutional:       General: He is not in acute distress.  HENT:      Right Ear: External ear normal.      Left Ear: External ear normal.      Nose: Nose normal.      Mouth/Throat:      Mouth: Mucous membranes are moist.   Eyes:      Extraocular Movements: Extraocular movements intact.      " Conjunctiva/sclera: Conjunctivae normal.      Pupils: Pupils are equal, round, and reactive to light.   Pulmonary:      Effort: Pulmonary effort is normal.   Abdominal:      General: There is no distension.      Palpations: Abdomen is soft.   Musculoskeletal:         General: No swelling. Normal range of motion.      Cervical back: Normal range of motion.   Skin:     General: Skin is warm and dry.      Findings: No rash.   Neurological:      General: No focal deficit present.      Mental Status: He is alert and oriented to person, place, and time.   Psychiatric:         Mood and Affect: Mood normal.         Behavior: Behavior normal.           Health Maintenance         Date Due Completion Date    Shingles Vaccine (1 of 2) Never done ---    Eye Exam 11/25/2021 11/25/2020    COVID-19 Vaccine (3 - Moderna risk series) 03/04/2022 2/4/2022    Influenza Vaccine (1) 09/01/2023 10/8/2021    Foot Exam 12/22/2023 12/22/2022    Diabetes Urine Screening 12/27/2023 12/27/2022    Lipid Panel 12/27/2023 12/27/2022    Hemoglobin A1c 01/18/2024 7/18/2023    Override on 11/11/2014: Done (future)    Colorectal Cancer Screening 02/26/2024 2/26/2019    TETANUS VACCINE 08/24/2028 8/24/2018              ASSESSMENT     73 y.o. male with     1. Essential hypertension    2. Type 2 diabetes mellitus with stage 3b chronic kidney disease, without long-term current use of insulin    3. Thrombocytopenia, unspecified    4. Aortic atherosclerosis    5. Atherosclerosis of native coronary artery of native heart with angina pectoris    6. Coronary artery disease involving native coronary artery of native heart without angina pectoris    7. Obstructive sleep apnea syndrome        PLAN:     1. Essential hypertension  - Stable. Patient was counseled and encouraged to maintain a low sodium diet, as well as increasing physical activity.  Recommend random BP checks at home on a regular basis. Will continue medication at this time, and follow up in 3-6  months, or sooner if blood pressure begins to increase.       2. Type 2 diabetes mellitus with stage 3b chronic kidney disease, without long-term current use of insulin  - Improving. Patient is encouraged to follow a diet low in carbohydrates and simple sugars. Advised to focus on good food choices and increased physical activity and encouraged to adhere to medication regimen and/or lifestyle adjustments, and to check glucose level as recommended.  Contact office if glucose levels are not improving over time.  Will monitor HbA1c appropriately.     3. Thrombocytopenia, unspecified  - Noted, will continue to monitor with annual labs.    4. Aortic atherosclerosis  - Stable, continue current regimen. Follow up 3 months.    5. Atherosclerosis of native coronary artery of native heart with angina pectoris  - Stable, continue current regimen. Follow up 3 months.    6. Coronary artery disease involving native coronary artery of native heart without angina pectoris  - Stable, continue current regimen. Follow up 3 months.  - clopidogreL (PLAVIX) 75 mg tablet; Take 1 tablet (75 mg total) by mouth once daily.  Dispense: 90 tablet; Refill: 1    7. Obstructive sleep apnea syndrome  - Stable, continue current regimen. Follow up 3 months.        Jory Wolf MD  09/06/2023 2:39 PM        Follow up in about 3 months (around 12/5/2023).

## 2023-09-05 NOTE — PROGRESS NOTES
Health Maintenance Due   Topic     Shingles Vaccine (1 of 2)  hx chicken pox. Notified pt can get vaccine at pharmacy    Eye Exam  Consult pcp    COVID-19 Vaccine (3 - Moderna risk series) Not offered at this office    Influenza Vaccine (1)

## 2023-09-05 NOTE — PATIENT INSTRUCTIONS
Getachew Echevarria,     If you are due for any health screening(s) below please notify me so we can arrange them to be ordered and scheduled. Most healthy patients at your age complete them, but you are free to accept or refuse.     If you can't do it, I'll definitely understand. If you can, I'd certainly appreciate it!    Tests to Keep You Healthy    Eye Exam: SCHEDULED  Colon Cancer Screening: Met on 2/26/2019  Last Blood Pressure <= 139/89 (9/5/2023): Yes  Last HbA1c < 8 (07/18/2023): Yes      Your diabetic retinal eye exam is due     Diabetes is the #1 cause of blindness in the [place] - early detection before signs or symptoms develop can prevent debilitating blindness.     Our records indicate that you may be overdue for your annual diabetic eye exam. Eye screening can help identify patients at risk for developing vision loss which is common in diabetes. This simple screening is an important step to keeping you healthy and preventing complications from diabetes.     This recommended diabetic eye exam should take place once per year and can prevent and treat diabetes complications in the eye before developing symptoms. This can be done with a special camera is used to take photographs of the back of your eye without having to dilate them, or you can see an eye doctor for a full dilated exam.     If you recently had your yearly diabetic eye exam performed outside of [facility], please let your Health care team know so that they can update your health record.

## 2023-10-16 DIAGNOSIS — I10 ESSENTIAL HYPERTENSION: ICD-10-CM

## 2023-10-16 RX ORDER — LISINOPRIL 5 MG/1
5 TABLET ORAL DAILY
Qty: 90 TABLET | Refills: 3 | Status: SHIPPED | OUTPATIENT
Start: 2023-10-16 | End: 2024-10-15

## 2023-10-16 NOTE — TELEPHONE ENCOUNTER
----- Message from Mary Jane Regine sent at 10/16/2023  9:03 AM CDT -----  Regarding: self  [phone]  .Type: RX Refill Request    Who Called: self     Have you contacted your pharmacy: no     Refill or New Rx:  refill    RX Name and Strength: lisinopriL (PRINIVIL,ZESTRIL) 5 MG tablet      Is this a 30 day or 90 day RX: 90 day     Preferred Pharmacy with phone number: .  [id number] - [place] [address] AT [address]  [place] [address]  Phone: [phone] Fax: [phone]    Local or Mail Order: local     Would the patient rather a call back or a response via [facility]?  Call back     Best Call Back Number: [phone]

## 2023-10-16 NOTE — TELEPHONE ENCOUNTER
Last Office Visit Info:   The patient's last visit with Jory Wolf MD was on 9/5/2023.    The patient's last visit in current department was on 9/5/2023.        Last CBC Results:   Lab Results   Component Value Date    WBC 6.14 02/09/2023    HGB 13.3 (L) 02/09/2023    HCT 40.5 02/09/2023     (L) 02/09/2023       Last CMP Results  Lab Results   Component Value Date     02/09/2023    K 4.6 02/09/2023     02/09/2023    CO2 24 02/09/2023    BUN 25 (H) 02/09/2023    CREATININE 2.1 (H) 02/09/2023    CALCIUM 9.1 02/09/2023    ALBUMIN 4.1 02/09/2023    AST 23 02/09/2023    ALT 25 02/09/2023       Last Lipids  Lab Results   Component Value Date    CHOL 106 (L) 12/27/2022    TRIG 89 12/27/2022    HDL 33 (L) 12/27/2022    LDLCALC 55.2 (L) 12/27/2022       Last A1C  Lab Results   Component Value Date    HGBA1C 7.4 (H) 07/18/2023       Last TSH  Lab Results   Component Value Date    TSH 1.843 12/27/2022             Current Med Refills  Medication List with Changes/Refills   Current Medications    AMIODARONE (PACERONE) 200 MG TAB    Take 1 tablet (200 mg total) by mouth 2 (two) times daily.       Start Date: 12/22/2022End Date: --    ATORVASTATIN (LIPITOR) 40 MG TABLET    Take 1 tablet (40 mg total) by mouth once daily.       Start Date: 12/22/2022End Date: 12/22/2023    BLOOD-GLUCOSE METER KIT    To check BG 2 times daily, to use with insurance preferred meter       Start Date: 12/15/2019End Date: 10/8/2023    CLOPIDOGREL (PLAVIX) 75 MG TABLET    Take 1 tablet (75 mg total) by mouth once daily.       Start Date: 9/5/2023  End Date: --    ELIQUIS 5 MG TAB    Take 1 tablet (5 mg total) by mouth 2 (two) times daily.       Start Date: 1/25/2023 End Date: 1/25/2024    FAMOTIDINE (PEPCID) 40 MG TABLET    Take 1 tablet (40 mg total) by mouth 2 (two) times daily as needed for Heartburn.       Start Date: 7/25/2023 End Date: --    LANCETS (SAFETY LANCETS) 21 GAUGE MISC    1 lancet by Misc.(Non-Drug; Combo Route)  route 2 (two) times daily.       Start Date: 2019 End Date: --    LANCETS MISC    To check BG 2 times daily, to use with insurance preferred meter       Start Date: 12/15/2019End Date: --    LISINOPRIL (PRINIVIL,ZESTRIL) 5 MG TABLET    Take 1 tablet (5 mg total) by mouth once daily.       Start Date: 2023 End Date: --    NITROGLYCERIN (NITROSTAT) 0.4 MG SL TABLET    Place 0.4 mg under the tongue 4 (four) times daily as needed.       Start Date: 2021  End Date: --    OZEMPIC 0.25 MG OR 0.5 MG(2 MG/1.5 ML) PEN INJECTOR    SMARTSI.25 Milligram(s) SUB-Q Once a Week       Start Date: 2022End Date: --    PANTOPRAZOLE (PROTONIX) 40 MG TABLET    Take 1 tablet (40 mg total) by mouth once daily.       Start Date: 2023  End Date: 2024    SODIUM BICARBONATE 650 MG TABLET    Take 1 tablet (650 mg total) by mouth 2 (two) times daily.       Start Date: 2023  End Date: 2024       Order(s) placed per written order guidelines: none    Please advise.

## 2023-11-03 ENCOUNTER — OFFICE VISIT (OUTPATIENT)
Dept: OPHTHALMOLOGY | Facility: CLINIC | Age: 73
End: 2023-11-03
Payer: MEDICARE

## 2023-11-03 DIAGNOSIS — H52.7 REFRACTIVE ERROR: ICD-10-CM

## 2023-11-03 DIAGNOSIS — H43.812 VITREOUS DETACHMENT OF LEFT EYE: ICD-10-CM

## 2023-11-03 DIAGNOSIS — H26.491 PCO (POSTERIOR CAPSULAR OPACIFICATION), RIGHT: Primary | ICD-10-CM

## 2023-11-03 DIAGNOSIS — E11.9 TYPE 2 DIABETES MELLITUS WITHOUT COMPLICATION, UNSPECIFIED WHETHER LONG TERM INSULIN USE: ICD-10-CM

## 2023-11-03 DIAGNOSIS — Z96.1 PSEUDOPHAKIA: ICD-10-CM

## 2023-11-03 DIAGNOSIS — E11.9 DM TYPE 2 WITHOUT RETINOPATHY: ICD-10-CM

## 2023-11-03 PROCEDURE — 99999 PR PBB SHADOW E&M-EST. PATIENT-LVL III: CPT | Mod: PBBFAC,,, | Performed by: OPHTHALMOLOGY

## 2023-11-03 PROCEDURE — 92014 COMPRE OPH EXAM EST PT 1/>: CPT | Mod: S$PBB,,, | Performed by: OPHTHALMOLOGY

## 2023-11-03 PROCEDURE — 99213 OFFICE O/P EST LOW 20 MIN: CPT | Mod: PBBFAC,PO | Performed by: OPHTHALMOLOGY

## 2023-11-03 PROCEDURE — 99999 PR PBB SHADOW E&M-EST. PATIENT-LVL III: ICD-10-PCS | Mod: PBBFAC,,, | Performed by: OPHTHALMOLOGY

## 2023-11-03 PROCEDURE — 92014 PR EYE EXAM, EST PATIENT,COMPREHESV: ICD-10-PCS | Mod: S$PBB,,, | Performed by: OPHTHALMOLOGY

## 2023-11-03 NOTE — PROGRESS NOTES
Subjective:       Patient ID: Wale Calvo is a 73 y.o. male.    Chief Complaint: Annual Exam    HPI    74 y/o male here for annual diabetic eye exam  Pt state floaters OS   Wear +1.50 otc   No other complaints at this time           ]1. Early PCO OD   2. Yag Cap OS   3. MAXIMILIAN   4. PVD OS   5. DM no NPDR   6. HTN     Last edited by Malcolm Lizama on 11/3/2023  8:09 AM.             Assessment:       1. PCO (posterior capsular opacification), right    2. Vitreous detachment of left eye    3. DM type 2 without retinopathy    4. Type 2 diabetes mellitus without complication, unspecified whether long term insulin use    5. Refractive error    6. Pseudophakia        Plan:       Early PCO OD- Not Visually Significant.   PVD OS-Stable.  DM-No NPDR OU.  RE-Pt does not want MRx.      Control DM & HTN.  RTC 1 yr.

## 2023-11-22 ENCOUNTER — TELEPHONE (OUTPATIENT)
Dept: FAMILY MEDICINE | Facility: CLINIC | Age: 73
End: 2023-11-22
Payer: MEDICARE

## 2023-11-22 DIAGNOSIS — N18.32 TYPE 2 DIABETES MELLITUS WITH STAGE 3B CHRONIC KIDNEY DISEASE, WITHOUT LONG-TERM CURRENT USE OF INSULIN: Primary | ICD-10-CM

## 2023-11-22 DIAGNOSIS — E11.22 TYPE 2 DIABETES MELLITUS WITH STAGE 3B CHRONIC KIDNEY DISEASE, WITHOUT LONG-TERM CURRENT USE OF INSULIN: Primary | ICD-10-CM

## 2023-11-22 NOTE — TELEPHONE ENCOUNTER
Pt states his blood sugars have been slightly elevated, in the 190's so he started taking the glimepiride again; I informed him Dr Wolf is currently out of the office until Monday; he verbalized understanding and states he can wait until she returns

## 2023-11-22 NOTE — TELEPHONE ENCOUNTER
----- Message from Joi Toledo sent at 11/22/2023  1:58 PM CST -----  Regarding: self [phone]  Type: RX Refill Request    Who Called: self     Have you contacted your pharmacy: yes    Refill or New Rx: refill     RX Name and Strength: pt stated the medication is called Glymapride.     Preferred Pharmacy with phone number:   [id number] - [place] [address] [place] [place] [address]  Phone: [phone] Fax: [phone]    Local or Mail Order: local    Would the patient rather a call back or a response via [facility]? Call back    Best Call Back Number: [phone]

## 2023-11-27 RX ORDER — GLIMEPIRIDE 1 MG/1
1-2 TABLET ORAL
Qty: 180 TABLET | Refills: 3 | Status: SHIPPED | OUTPATIENT
Start: 2023-11-27 | End: 2024-11-26

## 2023-12-05 ENCOUNTER — OFFICE VISIT (OUTPATIENT)
Dept: FAMILY MEDICINE | Facility: CLINIC | Age: 73
End: 2023-12-05
Payer: MEDICARE

## 2023-12-05 VITALS
TEMPERATURE: 98 F | WEIGHT: 315 LBS | HEART RATE: 75 BPM | DIASTOLIC BLOOD PRESSURE: 82 MMHG | SYSTOLIC BLOOD PRESSURE: 118 MMHG | BODY MASS INDEX: 41.75 KG/M2 | HEIGHT: 73 IN | OXYGEN SATURATION: 94 % | RESPIRATION RATE: 18 BRPM

## 2023-12-05 DIAGNOSIS — G47.33 OBSTRUCTIVE SLEEP APNEA SYNDROME: ICD-10-CM

## 2023-12-05 DIAGNOSIS — N18.32 TYPE 2 DIABETES MELLITUS WITH STAGE 3B CHRONIC KIDNEY DISEASE, WITHOUT LONG-TERM CURRENT USE OF INSULIN: ICD-10-CM

## 2023-12-05 DIAGNOSIS — I10 ESSENTIAL HYPERTENSION: ICD-10-CM

## 2023-12-05 DIAGNOSIS — Z00.00 ANNUAL PHYSICAL EXAM: Primary | ICD-10-CM

## 2023-12-05 DIAGNOSIS — Z12.5 ENCOUNTER FOR SCREENING FOR MALIGNANT NEOPLASM OF PROSTATE: ICD-10-CM

## 2023-12-05 DIAGNOSIS — E11.22 TYPE 2 DIABETES MELLITUS WITH STAGE 3B CHRONIC KIDNEY DISEASE, WITHOUT LONG-TERM CURRENT USE OF INSULIN: ICD-10-CM

## 2023-12-05 DIAGNOSIS — E55.9 VITAMIN D DEFICIENCY: ICD-10-CM

## 2023-12-05 PROCEDURE — 99214 OFFICE O/P EST MOD 30 MIN: CPT | Mod: S$PBB,,, | Performed by: STUDENT IN AN ORGANIZED HEALTH CARE EDUCATION/TRAINING PROGRAM

## 2023-12-05 PROCEDURE — 99213 OFFICE O/P EST LOW 20 MIN: CPT | Mod: PBBFAC,PO | Performed by: STUDENT IN AN ORGANIZED HEALTH CARE EDUCATION/TRAINING PROGRAM

## 2023-12-05 PROCEDURE — 99999 PR PBB SHADOW E&M-EST. PATIENT-LVL III: CPT | Mod: PBBFAC,,, | Performed by: STUDENT IN AN ORGANIZED HEALTH CARE EDUCATION/TRAINING PROGRAM

## 2023-12-05 PROCEDURE — 99214 PR OFFICE/OUTPT VISIT, EST, LEVL IV, 30-39 MIN: ICD-10-PCS | Mod: S$PBB,,, | Performed by: STUDENT IN AN ORGANIZED HEALTH CARE EDUCATION/TRAINING PROGRAM

## 2023-12-05 PROCEDURE — 99999 PR PBB SHADOW E&M-EST. PATIENT-LVL III: ICD-10-PCS | Mod: PBBFAC,,, | Performed by: STUDENT IN AN ORGANIZED HEALTH CARE EDUCATION/TRAINING PROGRAM

## 2023-12-05 RX ORDER — SEMAGLUTIDE 2.68 MG/ML
2 INJECTION, SOLUTION SUBCUTANEOUS
Qty: 9 ML | Refills: 3 | Status: SHIPPED | OUTPATIENT
Start: 2023-12-05 | End: 2024-12-04

## 2023-12-05 NOTE — PROGRESS NOTES
SUBJECTIVE     Chief Complaint   Patient presents with    Follow-up       HPI  [facility] Umesh is a 72 y.o. male with   HTN, CAD, HLD, afib, T2DM since 2013, ROSE sleeps CPAP, nephrolithiasis, CKD3B that presents for follow-up of chronic medical conditions.     Pt reports he is overall doing well without complaint. BP well controlled, A1C 7.4 at last check. Pt taking ozempic 1mg weekly, and glimepiride 1mg PRN.     PAST MEDICAL HISTORY:  Past Medical History:   Diagnosis Date    Anticoagulant long-term use     Eliquis    Colonic polyp 07/25/2017    Coronary artery disease     CPAP (continuous positive airway pressure) dependence     Diabetes mellitus type II     Diabetes with neurologic complications     ED (erectile dysfunction)     GERD (gastroesophageal reflux disease)     Hyperlipidemia     Hypertension     Kidney stones     Kidney stones     Morbidly obese     Paroxysmal A-fib     Renal manifestation of secondary diabetes mellitus     Sleep apnea        ALLERGIES AND MEDICATIONS: updated and reviewed.  Review of patient's allergies indicates:   Allergen Reactions    Penicillins Anaphylaxis     Current Outpatient Medications   Medication Sig Dispense Refill    amiodarone (PACERONE) 200 MG Tab Take 1 tablet (200 mg total) by mouth 2 (two) times daily. (Patient taking differently: Take 100 mg by mouth once daily.) 60 tablet 11    atorvastatin (LIPITOR) 40 MG tablet Take 1 tablet (40 mg total) by mouth once daily. 90 tablet 3    blood-glucose meter kit To check BG 2 times daily, to use with insurance preferred meter 1 each 0    clopidogreL (PLAVIX) 75 mg tablet Take 1 tablet (75 mg total) by mouth once daily. 90 tablet 1    ELIQUIS 5 mg Tab Take 1 tablet (5 mg total) by mouth 2 (two) times daily. 180 tablet 3    famotidine (PEPCID) 40 MG tablet Take 1 tablet (40 mg total) by mouth 2 (two) times daily as needed for Heartburn. 180 tablet 0    glimepiride (AMARYL) 1 MG tablet Take 1-2 tablets (1-2 mg total) by  "mouth before breakfast. 180 tablet 3    lancets (SAFETY LANCETS) 21 gauge Misc 1 lancet by Misc.(Non-Drug; Combo Route) route 2 (two) times daily. 100 each 11    lancets [facility] To check BG 2 times daily, to use with insurance preferred meter 100 each 0    lisinopriL (PRINIVIL,ZESTRIL) 5 MG tablet Take 1 tablet (5 mg total) by mouth once daily. 90 tablet 3    nitroGLYCERIN (NITROSTAT) 0.4 MG SL tablet Place 0.4 mg under the tongue 4 (four) times daily as needed.      pantoprazole (PROTONIX) 40 MG tablet Take 1 tablet (40 mg total) by mouth once daily. (Patient not taking: Reported on 12/5/2023) 30 tablet 11    semaglutide (OZEMPIC) 2 mg/dose (8 mg/3 mL) PnIj Inject 2 mg into the skin every 7 days. 9 mL 3     No current facility-administered medications for this visit.       ROS  Review of Systems   Constitutional:  Negative for chills, fatigue and fever.   HENT:  Negative for rhinorrhea and sore throat.    Respiratory:  Negative for cough and shortness of breath.    Cardiovascular:  Negative for chest pain and palpitations.   Gastrointestinal:  Negative for constipation, diarrhea, nausea and vomiting.   Genitourinary:  Negative for dysuria.   Musculoskeletal:  Negative for joint swelling.   Skin:  Negative for rash and wound.   Neurological:  Negative for light-headedness and headaches.   Psychiatric/Behavioral:  Negative for dysphoric mood and sleep disturbance. The patient is not nervous/anxious.          OBJECTIVE     Physical Exam  Vitals:    12/05/23 1318   BP: 118/82   Pulse: 75   Resp: 18   Temp: 97.5 °F (36.4 °C)    Body mass index is 46.97 kg/m².  Weight: (!) 161.5 kg (356 lb 0.7 oz)   Height: 6' 1" (185.4 cm)     Physical Exam  Vitals reviewed.   Constitutional:       General: He is not in acute distress.  HENT:      Right Ear: External ear normal.      Left Ear: External ear normal.      Nose: Nose normal.      Mouth/Throat:      Mouth: Mucous membranes are moist.   Eyes:      Extraocular Movements: " Extraocular movements intact.      Conjunctiva/sclera: Conjunctivae normal.      Pupils: Pupils are equal, round, and reactive to light.   Pulmonary:      Effort: Pulmonary effort is normal.   Abdominal:      General: There is no distension.      Palpations: Abdomen is soft.   Musculoskeletal:         General: No swelling. Normal range of motion.      Cervical back: Normal range of motion.   Skin:     General: Skin is warm and dry.      Findings: No rash.   Neurological:      General: No focal deficit present.      Mental Status: He is alert and oriented to person, place, and time.   Psychiatric:         Mood and Affect: Mood normal.         Behavior: Behavior normal.           Health Maintenance         Date Due Completion Date    Shingles Vaccine (1 of 2) Never done ---    RSV Vaccine (Age 60+ and Pregnant patients) (1 - 1-dose 60+ series) Never done ---    COVID-19 Vaccine (3 - Moderna risk series) 03/04/2022 2/4/2022    Influenza Vaccine (1) 09/01/2023 10/8/2021    Foot Exam 12/22/2023 12/22/2022    Diabetes Urine Screening 12/27/2023 12/27/2022    Lipid Panel 12/27/2023 12/27/2022    Colorectal Cancer Screening 02/26/2024 2/26/2019    Hemoglobin A1c 01/18/2024 7/18/2023    Override on 11/11/2014: Done (future)    Eye Exam 11/03/2024 11/3/2023    TETANUS VACCINE 08/24/2028 8/24/2018              ASSESSMENT     73 y.o. male with     1. Annual physical exam    2. Type 2 diabetes mellitus with stage 3b chronic kidney disease, without long-term current use of insulin    3. Essential hypertension    4. Obstructive sleep apnea syndrome    5. Encounter for screening for malignant neoplasm of prostate    6. Vitamin D deficiency        PLAN:     1. Annual physical exam  - Discussed age and gender appropriate screenings at this visit and encouraged a healthy diet low in simple carbohydrates, and increased physical activity.  Counseled on medically appropriate vaccines based on age and current health condition.  Screening  test reviewed and discussed with patient.   - Hemoglobin A1C; Future  - Lipid Panel; Future  - TSH; Future  - Comprehensive Metabolic Panel; Future  - CBC Auto Differential; Future    2. Type 2 diabetes mellitus with stage 3b chronic kidney disease, without long-term current use of insulin  - Stable, increase ozempic dose. Patient is encouraged to follow a diet low in carbohydrates and simple sugars. Advised to focus on good food choices and increased physical activity and encouraged to adhere to medication regimen and/or lifestyle adjustments, and to check glucose level as recommended.  Contact office if glucose levels are not improving over time.  Will monitor HbA1c appropriately.   - Hemoglobin A1C; Future  - Lipid Panel; Future  - TSH; Future  - Comprehensive Metabolic Panel; Future  - CBC Auto Differential; Future    3. Essential hypertension  - Stable. Patient was counseled and encouraged to maintain a low sodium diet, as well as increasing physical activity.  Recommend random BP checks at home on a regular basis. Will continue medication at this time, and follow up in 3-6 months, or sooner if blood pressure begins to increase.     - Hemoglobin A1C; Future  - Lipid Panel; Future  - TSH; Future  - Comprehensive Metabolic Panel; Future  - CBC Auto Differential; Future    4. Obstructive sleep apnea syndrome  - Stable. Cont t omonitor. F/u 3 months.  - PSA, SCREENING; Future    5. Encounter for screening for malignant neoplasm of prostate  - Due, ordered after shared decision-making with pt.  - PSA, SCREENING; Future    6. Vitamin D deficiency  - Stable, continue current regimen. Due for laboratory monitoring, ordered. Follow up 3 months.  - Calcitriol; Future        Jory Wolf MD  12/05/2023 1:28 PM        Follow up in about 3 months (around 3/5/2024).

## 2023-12-11 ENCOUNTER — TELEPHONE (OUTPATIENT)
Dept: FAMILY MEDICINE | Facility: CLINIC | Age: 73
End: 2023-12-11
Payer: MEDICARE

## 2023-12-11 NOTE — TELEPHONE ENCOUNTER
----- Message from Mary Carmona sent at 12/11/2023 12:32 PM CST -----  Regarding: Self/ [phone]  Type: Patient Call Back    Who called:  Patient    What is the request in detail:  Patient is needing a referral for Podiatry for his toe  Please give patient a call.  Thank you    Would the patient rather a call back or a response via [facility]?   Call back    Best call back number: [phone]           Thank you

## 2023-12-11 NOTE — TELEPHONE ENCOUNTER
----- Message from Michael Cool sent at 12/11/2023  2:33 PM CST -----  Regarding: Return Call    Who Called:  Patient      Who Left Message for Patient:  Corina       Does the patient know what this is regarding?  About a referral        Best Call Back Number:  [phone]         Additional Information: Patient is returning a call.  Please assist.

## 2023-12-18 ENCOUNTER — TELEPHONE (OUTPATIENT)
Dept: FAMILY MEDICINE | Facility: CLINIC | Age: 73
End: 2023-12-18
Payer: MEDICARE

## 2023-12-18 NOTE — TELEPHONE ENCOUNTER
----- Message from Marion Chavis sent at 12/18/2023 10:15 AM CST -----  Regarding: self [phone]  Type: RX Refill Request    Who Called:  self    Have you contacted your pharmacy: yes    Refill or New Rx: refill    RX Name and Strength:clopidogreL (PLAVIX) 75 mg tablet      Is this a 30 day or 90 day RX: 30    Preferred Pharmacy with phone number:    [id number] - [place] AT [address]  [place] [address]  Phone: [phone] Fax: [phone]    Local or Mail Order: local     Ordering Provider:Luciano    Would the patient rather a call back or a response via [facility]? Call back     Best Call Back Number: [phone]

## 2023-12-26 ENCOUNTER — TELEPHONE (OUTPATIENT)
Dept: FAMILY MEDICINE | Facility: CLINIC | Age: 73
End: 2023-12-26
Payer: MEDICARE

## 2023-12-26 DIAGNOSIS — G47.33 OBSTRUCTIVE SLEEP APNEA SYNDROME: Primary | ICD-10-CM

## 2023-12-26 DIAGNOSIS — E78.00 PURE HYPERCHOLESTEROLEMIA: ICD-10-CM

## 2023-12-26 NOTE — TELEPHONE ENCOUNTER
Last Office Visit Info:   The patient's last visit with Jory Wolf MD was on 12/5/2023.    The patient's last visit in current department was on 12/5/2023.        Last CBC Results:   Lab Results   Component Value Date    WBC 6.14 02/09/2023    HGB 13.3 (L) 02/09/2023    HCT 40.5 02/09/2023     (L) 02/09/2023       Last CMP Results  Lab Results   Component Value Date     02/09/2023    K 4.6 02/09/2023     02/09/2023    CO2 24 02/09/2023    BUN 25 (H) 02/09/2023    CREATININE 2.1 (H) 02/09/2023    CALCIUM 9.1 02/09/2023    ALBUMIN 4.1 02/09/2023    AST 23 02/09/2023    ALT 25 02/09/2023       Last Lipids  Lab Results   Component Value Date    CHOL 106 (L) 12/27/2022    TRIG 89 12/27/2022    HDL 33 (L) 12/27/2022    LDLCALC 55.2 (L) 12/27/2022       Last A1C  Lab Results   Component Value Date    HGBA1C 7.4 (H) 07/18/2023       Last TSH  Lab Results   Component Value Date    TSH 1.843 12/27/2022             Current Med Refills  Medication List with Changes/Refills   Current Medications    AMIODARONE (PACERONE) 200 MG TAB    Take 1 tablet (200 mg total) by mouth 2 (two) times daily.       Start Date: 12/22/2022End Date: --    ATORVASTATIN (LIPITOR) 40 MG TABLET    Take 1 tablet (40 mg total) by mouth once daily.       Start Date: 12/22/2022End Date: 12/22/2023    BLOOD-GLUCOSE METER KIT    To check BG 2 times daily, to use with insurance preferred meter       Start Date: 12/15/2019End Date: 12/5/2023    CLOPIDOGREL (PLAVIX) 75 MG TABLET    Take 1 tablet (75 mg total) by mouth once daily.       Start Date: 9/5/2023  End Date: --    ELIQUIS 5 MG TAB    Take 1 tablet (5 mg total) by mouth 2 (two) times daily.       Start Date: 1/25/2023 End Date: 1/25/2024    FAMOTIDINE (PEPCID) 40 MG TABLET    Take 1 tablet (40 mg total) by mouth 2 (two) times daily as needed for Heartburn.       Start Date: 7/25/2023 End Date: --    GLIMEPIRIDE (AMARYL) 1 MG TABLET    Take 1-2 tablets (1-2 mg total) by mouth  before breakfast.       Start Date: 11/27/2023End Date: 11/26/2024    LANCETS (SAFETY LANCETS) 21 GAUGE MISC    1 lancet by Misc.(Non-Drug; Combo Route) route 2 (two) times daily.       Start Date: 11/7/2019 End Date: --    LANCETS MISC    To check BG 2 times daily, to use with insurance preferred meter       Start Date: 12/15/2019End Date: --    LISINOPRIL (PRINIVIL,ZESTRIL) 5 MG TABLET    Take 1 tablet (5 mg total) by mouth once daily.       Start Date: 10/16/2023End Date: 10/15/2024    NITROGLYCERIN (NITROSTAT) 0.4 MG SL TABLET    Place 0.4 mg under the tongue 4 (four) times daily as needed.       Start Date: 4/6/2021  End Date: --    PANTOPRAZOLE (PROTONIX) 40 MG TABLET    Take 1 tablet (40 mg total) by mouth once daily.       Start Date: 2/9/2023  End Date: 2/9/2024    SEMAGLUTIDE (OZEMPIC) 2 MG/DOSE (8 MG/3 ML) PNIJ    Inject 2 mg into the skin every 7 days.       Start Date: 12/5/2023 End Date: 12/4/2024       Order(s) placed per written order guidelines: none    Please advise.

## 2023-12-26 NOTE — TELEPHONE ENCOUNTER
----- Message from Aleah Mcdowell sent at 12/26/2023 12:48 PM CST -----  Type:  Patient Requesting Referral    Who Called:Self    Referral to What Specialty:Pulmonary    Reason for Referral:Cpap Supplies    Does the patient want the referral with a specific physician?:No    Is the specialist an [facility]?:[facility]    Would the patient rather a call back or a response via [facility]? Call    Best Call Back Number:.[phone] (home)       Additional Information:fax [phone]

## 2023-12-27 RX ORDER — ATORVASTATIN CALCIUM 40 MG/1
40 TABLET, FILM COATED ORAL
Qty: 90 TABLET | Refills: 3 | Status: SHIPPED | OUTPATIENT
Start: 2023-12-27

## 2024-01-02 ENCOUNTER — LAB VISIT (OUTPATIENT)
Dept: LAB | Facility: HOSPITAL | Age: 74
End: 2024-01-02
Attending: STUDENT IN AN ORGANIZED HEALTH CARE EDUCATION/TRAINING PROGRAM
Payer: MEDICARE

## 2024-01-02 ENCOUNTER — TELEPHONE (OUTPATIENT)
Dept: FAMILY MEDICINE | Facility: CLINIC | Age: 74
End: 2024-01-02
Payer: MEDICARE

## 2024-01-02 DIAGNOSIS — Z00.00 ANNUAL PHYSICAL EXAM: ICD-10-CM

## 2024-01-02 DIAGNOSIS — E11.22 TYPE 2 DIABETES MELLITUS WITH STAGE 3B CHRONIC KIDNEY DISEASE, WITHOUT LONG-TERM CURRENT USE OF INSULIN: ICD-10-CM

## 2024-01-02 DIAGNOSIS — Z12.5 ENCOUNTER FOR SCREENING FOR MALIGNANT NEOPLASM OF PROSTATE: ICD-10-CM

## 2024-01-02 DIAGNOSIS — I10 ESSENTIAL HYPERTENSION: ICD-10-CM

## 2024-01-02 DIAGNOSIS — N18.32 TYPE 2 DIABETES MELLITUS WITH STAGE 3B CHRONIC KIDNEY DISEASE, WITHOUT LONG-TERM CURRENT USE OF INSULIN: ICD-10-CM

## 2024-01-02 DIAGNOSIS — E55.9 VITAMIN D DEFICIENCY: ICD-10-CM

## 2024-01-02 DIAGNOSIS — G47.33 OBSTRUCTIVE SLEEP APNEA SYNDROME: ICD-10-CM

## 2024-01-02 LAB
ALBUMIN SERPL BCP-MCNC: 3.9 G/DL (ref 3.5–5.2)
ALP SERPL-CCNC: 121 U/L (ref 55–135)
ALT SERPL W/O P-5'-P-CCNC: 17 U/L (ref 10–44)
ANION GAP SERPL CALC-SCNC: 12 MMOL/L (ref 8–16)
AST SERPL-CCNC: 17 U/L (ref 10–40)
BASOPHILS # BLD AUTO: 0.06 K/UL (ref 0–0.2)
BASOPHILS NFR BLD: 0.6 % (ref 0–1.9)
BILIRUB SERPL-MCNC: 1.2 MG/DL (ref 0.1–1)
BUN SERPL-MCNC: 23 MG/DL (ref 8–23)
CALCIUM SERPL-MCNC: 9.8 MG/DL (ref 8.7–10.5)
CHLORIDE SERPL-SCNC: 108 MMOL/L (ref 95–110)
CHOLEST SERPL-MCNC: 106 MG/DL (ref 120–199)
CHOLEST/HDLC SERPL: 3.4 {RATIO} (ref 2–5)
CO2 SERPL-SCNC: 23 MMOL/L (ref 23–29)
COMPLEXED PSA SERPL-MCNC: 1.1 NG/ML (ref 0–4)
CREAT SERPL-MCNC: 2.2 MG/DL (ref 0.5–1.4)
DIFFERENTIAL METHOD BLD: ABNORMAL
EOSINOPHIL # BLD AUTO: 0.8 K/UL (ref 0–0.5)
EOSINOPHIL NFR BLD: 7.7 % (ref 0–8)
ERYTHROCYTE [DISTWIDTH] IN BLOOD BY AUTOMATED COUNT: 13.6 % (ref 11.5–14.5)
EST. GFR  (NO RACE VARIABLE): 31 ML/MIN/1.73 M^2
ESTIMATED AVG GLUCOSE: 128 MG/DL (ref 68–131)
GLUCOSE SERPL-MCNC: 126 MG/DL (ref 70–110)
HBA1C MFR BLD: 6.1 % (ref 4–5.6)
HCT VFR BLD AUTO: 42.4 % (ref 40–54)
HDLC SERPL-MCNC: 31 MG/DL (ref 40–75)
HDLC SERPL: 29.2 % (ref 20–50)
HGB BLD-MCNC: 14.4 G/DL (ref 14–18)
IMM GRANULOCYTES # BLD AUTO: 0.04 K/UL (ref 0–0.04)
IMM GRANULOCYTES NFR BLD AUTO: 0.4 % (ref 0–0.5)
LDLC SERPL CALC-MCNC: 61.6 MG/DL (ref 63–159)
LYMPHOCYTES # BLD AUTO: 1.2 K/UL (ref 1–4.8)
LYMPHOCYTES NFR BLD: 11.9 % (ref 18–48)
MCH RBC QN AUTO: 30.7 PG (ref 27–31)
MCHC RBC AUTO-ENTMCNC: 34 G/DL (ref 32–36)
MCV RBC AUTO: 90 FL (ref 82–98)
MONOCYTES # BLD AUTO: 1 K/UL (ref 0.3–1)
MONOCYTES NFR BLD: 9.5 % (ref 4–15)
NEUTROPHILS # BLD AUTO: 7.1 K/UL (ref 1.8–7.7)
NEUTROPHILS NFR BLD: 69.9 % (ref 38–73)
NONHDLC SERPL-MCNC: 75 MG/DL
NRBC BLD-RTO: 0 /100 WBC
PLATELET # BLD AUTO: 156 K/UL (ref 150–450)
PMV BLD AUTO: 11.5 FL (ref 9.2–12.9)
POTASSIUM SERPL-SCNC: 4.5 MMOL/L (ref 3.5–5.1)
PROT SERPL-MCNC: 7 G/DL (ref 6–8.4)
RBC # BLD AUTO: 4.69 M/UL (ref 4.6–6.2)
SODIUM SERPL-SCNC: 143 MMOL/L (ref 136–145)
TRIGL SERPL-MCNC: 67 MG/DL (ref 30–150)
TSH SERPL DL<=0.005 MIU/L-ACNC: 2.92 UIU/ML (ref 0.4–4)
WBC # BLD AUTO: 10.2 K/UL (ref 3.9–12.7)

## 2024-01-02 PROCEDURE — 36415 COLL VENOUS BLD VENIPUNCTURE: CPT | Mod: PO | Performed by: STUDENT IN AN ORGANIZED HEALTH CARE EDUCATION/TRAINING PROGRAM

## 2024-01-02 PROCEDURE — 80061 LIPID PANEL: CPT | Performed by: STUDENT IN AN ORGANIZED HEALTH CARE EDUCATION/TRAINING PROGRAM

## 2024-01-02 PROCEDURE — 85025 COMPLETE CBC W/AUTO DIFF WBC: CPT | Performed by: STUDENT IN AN ORGANIZED HEALTH CARE EDUCATION/TRAINING PROGRAM

## 2024-01-02 PROCEDURE — 83036 HEMOGLOBIN GLYCOSYLATED A1C: CPT | Performed by: STUDENT IN AN ORGANIZED HEALTH CARE EDUCATION/TRAINING PROGRAM

## 2024-01-02 PROCEDURE — 84153 ASSAY OF PSA TOTAL: CPT | Performed by: STUDENT IN AN ORGANIZED HEALTH CARE EDUCATION/TRAINING PROGRAM

## 2024-01-02 PROCEDURE — 80053 COMPREHEN METABOLIC PANEL: CPT | Performed by: STUDENT IN AN ORGANIZED HEALTH CARE EDUCATION/TRAINING PROGRAM

## 2024-01-02 PROCEDURE — 84443 ASSAY THYROID STIM HORMONE: CPT | Performed by: STUDENT IN AN ORGANIZED HEALTH CARE EDUCATION/TRAINING PROGRAM

## 2024-01-02 PROCEDURE — 82652 VIT D 1 25-DIHYDROXY: CPT | Performed by: STUDENT IN AN ORGANIZED HEALTH CARE EDUCATION/TRAINING PROGRAM

## 2024-01-02 NOTE — TELEPHONE ENCOUNTER
----- Message from Trevor Barr sent at 1/2/2024  3:35 PM CST -----  Type: Patient Call Back         Who called: WENDY FELDMAN [[id number]]         What is the request in detail: PT CALLING FOR A REFERRAL          Can the clinic reply by [facility]? No         Would the patient rather a call back or a response via [facility]? Call back         Best call back number: Telephone Information:  Mobile          [phone]              Additional Information:         Thank you.

## 2024-01-04 DIAGNOSIS — K21.9 GASTROESOPHAGEAL REFLUX DISEASE WITHOUT ESOPHAGITIS: ICD-10-CM

## 2024-01-04 LAB — 1,25(OH)2D3 SERPL-MCNC: 15 PG/ML (ref 20–79)

## 2024-01-04 RX ORDER — FAMOTIDINE 40 MG/1
40 TABLET, FILM COATED ORAL 2 TIMES DAILY PRN
Qty: 180 TABLET | Refills: 0 | Status: SHIPPED | OUTPATIENT
Start: 2024-01-04

## 2024-01-04 NOTE — TELEPHONE ENCOUNTER
----- Message from Siri Timmons sent at 1/4/2024 12:55 PM CST -----  Type: RX Refill Request    Who Called:  self     Have you contacted your pharmacy: no    Refill or New Rx: refill    RX Name and Strength: famotidine (PEPCID) 40 MG tablet      Preferred Pharmacy with phone number: [facility] #[id number] - [place] - [address] [facility]   Phone: [phone]  Fax: [phone]    Local or Mail Order: local    Would the patient rather a call back or a response via [facility]?  call    Best Call Back Number: .[phone] (home)      Additional Information:

## 2024-02-14 ENCOUNTER — PATIENT OUTREACH (OUTPATIENT)
Dept: ADMINISTRATIVE | Facility: HOSPITAL | Age: 74
End: 2024-02-14
Payer: MEDICARE

## 2024-02-14 NOTE — PROGRESS NOTES
Population Health Chart Review & Patient Outreach Details     Dr. Wolf no longer with [facility],need establish care with another provider - need to schedule follow up after 3/5/2024 htn, dm. If want to follow Dr Wolf can go to [facility] at [address] [place] [address] [address], [place] [id number] - phone: [phone]. -- scheduled with Dr Marinelli.   Due for overdue HM (colonoscopy, and urine screening, need to get recent records if already done. If not done, orders/referrals need to be place and schedule. Please forward messages to me. -- pt will call when ready.  Further Action Needed If Patient Returns Outreach:            Updates Requested / Reviewed:     [x]  Care Everywhere    []     []  External Sources ([facility], [employer], [employer], etc.)    [] [facility]   [] [facility]   [] Other:    []  Care Team Updated   []  Removed  or Duplicate Orders   []  Immunization Reconciliation Completed / Queried    [] [place]   [] [place]   [] [place]   [] [place]      Health Maintenance Topics Addressed and Outreach Outcomes / Actions Taken:             Breast Cancer Screening []  Mammogram Order Placed    []  Mammogram Screening Scheduled    []  External Records Requested & Care Team Updated if Applicable    []  External Records Uploaded & Care Team Updated if Applicable    []  Pt Declined Scheduling Mammogram    []  Pt Will Schedule with External Provider / Order Routed & Care Team Updated if Applicable              Cervical Cancer Screening []  Pap Smear Scheduled in Primary Care or OBGYN    []  External Records Requested & Care Team Updated if Applicable       []  External Records Uploaded, Care Team Updated, & History Updated if Applicable    []  Patient Declined Scheduling Pap Smear    []  Patient Will Schedule with External Provider & Care Team Updated if Applicable                  Colorectal Cancer Screening []  Colonoscopy Case Request / Referral / Home Test Order Placed    []  External Records  Requested & Care Team Updated if Applicable    []  External Records Uploaded, Care Team Updated, & History Updated if Applicable    []  Patient Declined Completing Colon Cancer Screening    []  Patient Will Schedule with External Provider & Care Team Updated if Applicable    []  Fit Kit Mailed (add the SmartPhrase under additional notes)    []  Reminded Patient to Complete Home Test                Diabetic Eye Exam []  Eye Exam Screening Order Placed    []  Eye Camera Scheduled or Optometry/Ophthalmology Referral Placed    []  External Records Requested & Care Team Updated if Applicable    []  External Records Uploaded, Care Team Updated, & History Updated if Applicable    []  Patient Declined Scheduling Eye Exam    []  Patient Will Schedule with External Provider & Care Team Updated if Applicable             Blood Pressure Control []  Primary Care Follow Up Visit Scheduled     []  Remote Blood Pressure Reading Captured    []  Patient Declined Remote Reading or Scheduling Appt - Escalated to PCP    []  Patient Will Call Back or Send Portal Message with Reading                 HbA1c & Other Labs []  Overdue Lab(s) Ordered    []  Overdue Lab(s) Scheduled    []  External Records Uploaded & Care Team Updated if Applicable    []  Primary Care Follow Up Visit Scheduled     []  Reminded Patient to Complete A1c Home Test    [x]  Patient Declined Scheduling Labs or Will Call Back to Schedule    []  Patient Will Schedule with External Provider / Order Routed, & Care Team Updated if Applicable           Primary Care Appointment [x]  Primary Care Appt Scheduled    []  Patient Declined Scheduling or Will Call Back to Schedule    []  Pt Established with External Provider, Updated Care Team, & Informed Pt to Notify Payor if Applicable           Medication Adherence /    Statin Use []  Primary Care Appointment Scheduled    []  Patient Reminded to  Prescription    []  Patient Declined, Provider Notified if Needed    []  Sent  Provider Message to Review to Evaluate Pt for Statin, Add Exclusion Dx Codes, Document   Exclusion in Problem List, Change Statin Intensity Level to Moderate or High Intensity if Applicable                Osteoporosis Screening []  Dexa Order Placed    []  Dexa Appointment Scheduled    []  External Records Requested & Care Team Updated    []  External Records Uploaded, Care Team Updated, & History Updated if Applicable    []  Patient Declined Scheduling Dexa or Will Call Back to Schedule    []  Patient Will Schedule with External Provider / Order Routed & Care Team Updated if Applicable       Additional Notes:

## 2024-03-20 PROBLEM — N25.81 SECONDARY HYPERPARATHYROIDISM: Status: ACTIVE | Noted: 2024-03-20

## 2024-03-20 PROBLEM — N18.32 TYPE 2 DIABETES MELLITUS WITH STAGE 3B CHRONIC KIDNEY DISEASE, WITHOUT LONG-TERM CURRENT USE OF INSULIN: Status: ACTIVE | Noted: 2024-03-20

## 2024-03-20 PROBLEM — E11.22 TYPE 2 DIABETES MELLITUS WITH STAGE 3B CHRONIC KIDNEY DISEASE, WITHOUT LONG-TERM CURRENT USE OF INSULIN: Status: ACTIVE | Noted: 2024-03-20

## 2024-04-01 ENCOUNTER — TELEPHONE (OUTPATIENT)
Dept: FAMILY MEDICINE | Facility: CLINIC | Age: 74
End: 2024-04-01
Payer: MEDICARE

## 2024-04-09 DIAGNOSIS — K21.9 GASTROESOPHAGEAL REFLUX DISEASE WITHOUT ESOPHAGITIS: ICD-10-CM

## 2024-04-09 RX ORDER — FAMOTIDINE 40 MG/1
TABLET, FILM COATED ORAL
Qty: 180 TABLET | Refills: 0 | Status: SHIPPED | OUTPATIENT
Start: 2024-04-09

## 2024-05-28 DIAGNOSIS — Z00.00 ENCOUNTER FOR MEDICARE ANNUAL WELLNESS EXAM: ICD-10-CM

## 2024-06-26 DIAGNOSIS — E11.9 TYPE 2 DIABETES MELLITUS WITHOUT COMPLICATION: ICD-10-CM

## 2024-07-06 DIAGNOSIS — K21.9 GASTROESOPHAGEAL REFLUX DISEASE WITHOUT ESOPHAGITIS: ICD-10-CM

## 2024-07-08 RX ORDER — FAMOTIDINE 40 MG/1
TABLET, FILM COATED ORAL
Qty: 180 TABLET | Refills: 0 | Status: SHIPPED | OUTPATIENT
Start: 2024-07-08

## 2024-07-09 ENCOUNTER — PATIENT MESSAGE (OUTPATIENT)
Dept: ADMINISTRATIVE | Facility: HOSPITAL | Age: 74
End: 2024-07-09
Payer: MEDICARE

## 2024-07-11 DIAGNOSIS — E11.9 TYPE 2 DIABETES MELLITUS WITHOUT COMPLICATION: ICD-10-CM

## 2024-08-13 ENCOUNTER — TELEPHONE (OUTPATIENT)
Dept: ADMINISTRATIVE | Facility: CLINIC | Age: 74
End: 2024-08-13
Payer: MEDICARE

## 2024-08-13 NOTE — TELEPHONE ENCOUNTER
Called pt, informed pt I was calling to remind pt of his in office EAWV on 8/14/24; pt stated he wanted to cancel the appt for now; pt declined to reschedule at this time and stated he would call the office back once he was ready to reschedule; appt canceled per pt request

## 2024-09-30 ENCOUNTER — PATIENT MESSAGE (OUTPATIENT)
Dept: ADMINISTRATIVE | Facility: HOSPITAL | Age: 74
End: 2024-09-30
Payer: MEDICARE

## 2024-10-08 ENCOUNTER — OFFICE VISIT (OUTPATIENT)
Dept: FAMILY MEDICINE | Facility: CLINIC | Age: 74
End: 2024-10-08
Payer: MEDICARE

## 2024-10-08 ENCOUNTER — NURSE TRIAGE (OUTPATIENT)
Dept: ADMINISTRATIVE | Facility: CLINIC | Age: 74
End: 2024-10-08
Payer: MEDICARE

## 2024-10-08 DIAGNOSIS — J06.9 ACUTE URI: Primary | ICD-10-CM

## 2024-10-08 PROCEDURE — 99214 OFFICE O/P EST MOD 30 MIN: CPT | Mod: 95,,, | Performed by: FAMILY MEDICINE

## 2024-10-08 RX ORDER — ACETAMINOPHEN AND CODEINE PHOSPHATE 120; 12 MG/5ML; MG/5ML
5 SOLUTION ORAL NIGHTLY PRN
Qty: 118 ML | Refills: 0 | Status: SHIPPED | OUTPATIENT
Start: 2024-10-08

## 2024-10-08 RX ORDER — PROMETHAZINE HYDROCHLORIDE AND DEXTROMETHORPHAN HYDROBROMIDE 6.25; 15 MG/5ML; MG/5ML
5 SYRUP ORAL NIGHTLY PRN
Qty: 118 ML | Refills: 0 | Status: CANCELLED | OUTPATIENT
Start: 2024-10-08 | End: 2024-10-18

## 2024-10-08 RX ORDER — AZITHROMYCIN 250 MG/1
TABLET, FILM COATED ORAL
Qty: 6 TABLET | Refills: 0 | Status: SHIPPED | OUTPATIENT
Start: 2024-10-08 | End: 2024-10-13

## 2024-10-08 RX ORDER — AZITHROMYCIN 250 MG/1
TABLET, FILM COATED ORAL
Qty: 6 TABLET | Refills: 0 | Status: CANCELLED | OUTPATIENT
Start: 2024-10-08 | End: 2024-10-13

## 2024-10-08 NOTE — TELEPHONE ENCOUNTER
Pt is having chest congestion, pain with coughing, sore throat. Symptoms started 4 days ago. No fever. May be dehydrated because he was having so much pain with swallowing the last couple of days he has not been drinking much water. Rates pain 10/10.     Dispo- see in office today. Pt is currently in the ED with his wife. Requests visit for late this afternoon or VV. Virtual visit scheduled within timeframe. Care advice given. Pt VU.  Reason for Disposition   SEVERE sore throat pain    Additional Information   Negative: SEVERE difficulty breathing (e.g., struggling for each breath, speaks in single words)   Negative: Sounds like a life-threatening emergency to the triager   Negative: Drooling or spitting out saliva (because can't swallow)   Negative: Unable to open mouth completely   Negative: Drinking very little and has signs of dehydration (e.g., no urine > 12 hours, very dry mouth, very lightheaded)   Negative: Patient sounds very sick or weak to the triager   Negative: Difficulty breathing (per caller) but not severe   Negative: Fever > 103 F (39.4 C)   Negative: Refuses to drink anything for > 12 hours    Protocols used: Sore Throat-A-OH

## 2024-10-09 NOTE — PROGRESS NOTES
The patient location is: [place]  The chief complaint leading to consultation is: cough  Visit type: audiovisual  Total time spent with patient: 10 mins  Each patient to whom he or she provides medical services by telemedicine is:  (1) informed of the relationship between the physician and patient and the respective role of any other health care provider with respect to management of the patient; and (2) notified that he or she may decline to receive medical services by telemedicine and may withdraw from such care at any time.      Subjective:       Patient ID: Wale Calov is a 74 y.o. male.    Chief Complaint: No chief complaint on file.      HPI  74-year-old male presents for cough.  States this started about a week ago.  Was in [place] at that time and received over-the-counter medication.  States usually would helps his Z-Javier in his steroid pack.  Feels his cough has worsened.    Review of Systems   Constitutional: Negative.    HENT:  Positive for congestion and sore throat.    Respiratory:  Positive for cough.    Cardiovascular: Negative.    Gastrointestinal: Negative.    Endocrine: Negative.    Genitourinary: Negative.    Musculoskeletal: Negative.    Neurological: Negative.    Psychiatric/Behavioral: Negative.            Past Medical History:   Diagnosis Date    Anticoagulant long-term use     Eliquis    Colonic polyp 07/25/2017    Coronary artery disease     CPAP (continuous positive airway pressure) dependence     Diabetes mellitus type II     Diabetes with neurologic complications     ED (erectile dysfunction)     GERD (gastroesophageal reflux disease)     Hyperlipidemia     Hypertension     Kidney stones     Kidney stones     Morbidly obese     Paroxysmal A-fib     Renal manifestation of secondary diabetes mellitus     Sleep apnea      Past Surgical History:   Procedure Laterality Date    CATARACT EXTRACTION W/  INTRAOCULAR LENS IMPLANT Left 08/16/2016    Dr. Martinez    CATARACT EXTRACTION W/   INTRAOCULAR LENS IMPLANT Right 08/30/2016    Dr. Martinez    CORONARY STENT PLACEMENT      JOINT REPLACEMENT      both knees    SPINE SURGERY      discectomy    TONSILLECTOMY       Family History   Problem Relation Name Age of Onset    Dementia Mother      Cataracts Mother      Heart disease Father      No Known Problems Sister      Alcohol abuse Brother      No Known Problems Maternal Aunt      No Known Problems Maternal Uncle      No Known Problems Paternal Aunt      No Known Problems Paternal Uncle      Colon cancer Maternal Grandmother      No Known Problems Maternal Grandfather      No Known Problems Paternal Grandfather      Drug abuse Daughter      No Known Problems Daughter      No Known Problems Son      No Known Problems Son      Amblyopia Neg Hx      Blindness Neg Hx      Cancer Neg Hx      Diabetes Neg Hx      Glaucoma Neg Hx      Hypertension Neg Hx      Macular degeneration Neg Hx      Retinal detachment Neg Hx      Strabismus Neg Hx      Stroke Neg Hx      Thyroid disease Neg Hx      Esophageal cancer Neg Hx       Social History     Socioeconomic History    Marital status:      Spouse name: kolton    Number of children: 4    Highest education level: Bachelor's degree (e.g., BA, AB, BS)   Occupational History     Employer: [employer]   Tobacco Use    Smoking status: Never    Smokeless tobacco: Never   Substance and Sexual Activity    Alcohol use: No    Drug use: No    Sexual activity: Yes     Partners: Female     Social Drivers of Health     Financial Resource Strain: Low Risk  (10/8/2024)    Overall Financial Resource Strain (CARDIA)     Difficulty of Paying Living Expenses: Not hard at all   Food Insecurity: No Food Insecurity (10/8/2024)    Hunger Vital Sign     Worried About Running Out of Food in the Last Year: Never true     Ran Out of Food in the Last Year: Never true   Transportation Needs: No Transportation Needs (7/12/2022)    PRAPARE - Transportation     Lack of Transportation  (Medical): No     Lack of Transportation (Non-Medical): No   Physical Activity: Insufficiently Active (10/8/2024)    Exercise Vital Sign     Days of Exercise per Week: 1 day     Minutes of Exercise per Session: 30 min   Stress: No Stress Concern Present (10/8/2024)    [employer] [place] [employer] - Occupational Stress Questionnaire     Feeling of Stress : Not at all   Housing Stability: Unknown (7/12/2022)    Housing Stability Vital Sign     Unable to Pay for Housing in the Last Year: No     Unstable Housing in the Last Year: No       Current Outpatient Medications:     acetaminophen with codeine (ACETAMINOPHEN-CODEINE) 120mg 12mg 5mL Soln, Take 5 mLs by mouth nightly as needed., Disp: 118 mL, Rfl: 0    amiodarone (PACERONE) 200 MG Tab, Take 1 tablet (200 mg total) by mouth 2 (two) times daily. (Patient taking differently: Take 100 mg by mouth once daily.), Disp: 60 tablet, Rfl: 11    atorvastatin (LIPITOR) 40 MG tablet, TAKE 1 TABLET(40 MG) BY MOUTH EVERY DAY, Disp: 90 tablet, Rfl: 3    azithromycin (Z-CRISTO) 250 MG tablet, Take 2 tablets by mouth on day 1; Take 1 tablet by mouth on days 2-5, Disp: 6 tablet, Rfl: 0    blood-glucose meter kit, To check BG 2 times daily, to use with insurance preferred meter, Disp: 1 each, Rfl: 0    clopidogreL (PLAVIX) 75 mg tablet, TAKE 1 TABLET(75 MG) BY MOUTH DAILY, Disp: 90 tablet, Rfl: 1    famotidine (PEPCID) 40 MG tablet, TAKE 1 TABLET(40 MG) BY MOUTH TWICE DAILY AS NEEDED FOR HEARTBURN, Disp: 180 tablet, Rfl: 0    glimepiride (AMARYL) 1 MG tablet, Take 1-2 tablets (1-2 mg total) by mouth before breakfast., Disp: 180 tablet, Rfl: 3    lancets (SAFETY LANCETS) 21 gauge Misc, 1 lancet by Misc.(Non-Drug; Combo Route) route 2 (two) times daily., Disp: 100 each, Rfl: 11    lancets Misc, To check BG 2 times daily, to use with insurance preferred meter, Disp: 100 each, Rfl: 0    lisinopriL (PRINIVIL,ZESTRIL) 5 MG tablet, Take 1 tablet (5 mg total) by mouth once daily.,  Disp: 90 tablet, Rfl: 3    nitroGLYCERIN (NITROSTAT) 0.4 MG SL tablet, Place 0.4 mg under the tongue 4 (four) times daily as needed., Disp: , Rfl:     pantoprazole (PROTONIX) 40 MG tablet, Take 1 tablet (40 mg total) by mouth once daily. (Patient not taking: Reported on 12/5/2023), Disp: 30 tablet, Rfl: 11    semaglutide (OZEMPIC) 2 mg/dose (8 mg/3 mL) PnIj, Inject 2 mg into the skin every 7 days., Disp: 9 mL, Rfl: 3   Objective:      There were no vitals filed for this visit.    Physical Exam  Constitutional:       General: He is not in acute distress.     Appearance: He is not diaphoretic.   HENT:      Head: Normocephalic and atraumatic.   Eyes:      Conjunctiva/sclera: Conjunctivae normal.   Pulmonary:      Effort: Pulmonary effort is normal.   Musculoskeletal:      Cervical back: Neck supple.   Skin:     Findings: No rash.   Neurological:      Mental Status: He is alert and oriented to person, place, and time.   Psychiatric:         Behavior: Behavior normal.         Thought Content: Thought content normal.         Judgment: Judgment normal.            Assessment:       1. Acute URI        Plan:       Acute URI  -     azithromycin (Z-CRISTO) 250 MG tablet; Take 2 tablets by mouth on day 1; Take 1 tablet by mouth on days 2-5  Dispense: 6 tablet; Refill: 0  -     acetaminophen with codeine (ACETAMINOPHEN-CODEINE) 120mg 12mg 5mL Soln; Take 5 mLs by mouth nightly as needed.  Dispense: 118 mL; Refill: 0    Patient requested steroid pack but due to cardiac history and inability do a physical exam, I declined.  Did give patient antibiotics along with cough syrup  No future appointments.                Patient note was created using [employer] Ar[employer].  Any errors in syntax or even information may not have been identified and edited on initial review prior to signing this note.

## 2024-10-15 ENCOUNTER — OFFICE VISIT (OUTPATIENT)
Dept: FAMILY MEDICINE | Facility: CLINIC | Age: 74
End: 2024-10-15
Payer: MEDICARE

## 2024-10-15 VITALS
OXYGEN SATURATION: 18 % | BODY MASS INDEX: 41.75 KG/M2 | HEART RATE: 73 BPM | DIASTOLIC BLOOD PRESSURE: 80 MMHG | WEIGHT: 315 LBS | SYSTOLIC BLOOD PRESSURE: 124 MMHG | HEIGHT: 73 IN | RESPIRATION RATE: 96 BRPM | TEMPERATURE: 99 F

## 2024-10-15 DIAGNOSIS — I10 ESSENTIAL HYPERTENSION: ICD-10-CM

## 2024-10-15 DIAGNOSIS — J06.9 ACUTE URI: ICD-10-CM

## 2024-10-15 DIAGNOSIS — Z23 FLU VACCINE NEED: ICD-10-CM

## 2024-10-15 DIAGNOSIS — J06.9 VIRAL UPPER RESPIRATORY TRACT INFECTION: Primary | ICD-10-CM

## 2024-10-15 PROCEDURE — 99214 OFFICE O/P EST MOD 30 MIN: CPT | Mod: PBBFAC,PO

## 2024-10-15 PROCEDURE — 90653 IIV ADJUVANT VACCINE IM: CPT | Mod: PBBFAC,PO

## 2024-10-15 PROCEDURE — 99999PBSHW PR PBB SHADOW TECHNICAL ONLY FILED TO HB: Mod: PBBFAC,,,

## 2024-10-15 PROCEDURE — 99999 PR PBB SHADOW E&M-EST. PATIENT-LVL IV: CPT | Mod: PBBFAC,,,

## 2024-10-15 PROCEDURE — G0008 ADMIN INFLUENZA VIRUS VAC: HCPCS | Mod: PBBFAC,PO

## 2024-10-15 RX ORDER — ACETAMINOPHEN AND CODEINE PHOSPHATE 120; 12 MG/5ML; MG/5ML
5 SOLUTION ORAL NIGHTLY PRN
Qty: 118 ML | Refills: 0 | Status: SHIPPED | OUTPATIENT
Start: 2024-10-15

## 2024-10-15 RX ORDER — METHYLPREDNISOLONE 4 MG/1
TABLET ORAL
Qty: 21 EACH | Refills: 0 | Status: SHIPPED | OUTPATIENT
Start: 2024-10-15 | End: 2024-11-05

## 2024-10-15 RX ORDER — AZITHROMYCIN 250 MG/1
TABLET, FILM COATED ORAL
Qty: 6 TABLET | Refills: 0 | Status: SHIPPED | OUTPATIENT
Start: 2024-10-15 | End: 2024-10-20

## 2024-10-15 RX ORDER — LISINOPRIL 5 MG/1
5 TABLET ORAL DAILY
Qty: 90 TABLET | Refills: 3 | Status: SHIPPED | OUTPATIENT
Start: 2024-10-15 | End: 2025-10-15

## 2024-10-15 RX ADMIN — INFLUENZA A VIRUS A/VICTORIA/4897/2022 IVR-238 (H1N1) ANTIGEN (FORMALDEHYDE INACTIVATED), INFLUENZA A VIRUS A/THAILAND/8/2022 IVR-237 (H3N2) ANTIGEN (FORMALDEHYDE INACTIVATED), INFLUENZA B VIRUS B/AUSTRIA/1359417/2021 BVR-26 ANTIGEN (FORMALDEHYDE INACTIVATED) 0.5 ML: 15; 15; 15 INJECTION, SUSPENSION INTRAMUSCULAR at 09:10

## 2024-10-15 NOTE — PROGRESS NOTES
Family Medicine     Patient name: Wale Calvo  MRN: [id number]  : 1950  PCP NAME: Dre Calix MD    Subjective     History of Present Illness:  Patient ID: Wale Calvo is a 74 y.o. White male presents to the clinic today. Chronic medical issues, if present, have been documented.   Active Problem List with Overview Notes    Diagnosis Date Noted    Type 2 diabetes mellitus with stage 3b chronic kidney disease, without long-term current use of insulin 2024    Secondary hyperparathyroidism 2024    Thrombocytopenia, unspecified 2023    Atherosclerosis of native coronary artery of native heart with angina pectoris     Morbid (severe) obesity due to excess calories 2022    ACS (acute coronary syndrome) 2021    Paroxysmal atrial fibrillation 2021    CKD (chronic kidney disease) stage 3, GFR 30-59 ml/min 2021    Diverticulitis of colon 2019    Hemorrhoids 2019    Seasonal allergies 2019    Dry eye syndrome of both eyes 2019    Aortic atherosclerosis 2019    Vitreous detachment of left eye 2018    PCO (posterior capsular opacification), bilateral 2018    Pseudophakia 2018    Obstructive sleep apnea syndrome 2017    Lumbar transverse process fracture 2016    Nuclear sclerosis of right eye 2016    Senile nuclear sclerosis 2016    Refractive error 2016    Nuclear sclerosis of both eyes 2016    DM type 2 without retinopathy 2016    Essential hypertension 10/23/2013    Coronary artery disease involving native coronary artery of native heart without angina pectoris 10/23/2013    Hyperlipidemia, acquired 10/23/2013    Morbid obesity with BMI of 45.0-49.9, adult 10/23/2013       Chief Complaint  Chief Complaint   Patient presents with    Follow-up     Recently returned from a cruise.  Complains of cough for 1 week.  Productive of whitish sputum.  No fever.   No chest pain.  No shortness of breath.  Has not received flu shot for the year.  Has tried over-the-counter remedies which have not helped.  He recalls that he has had similar symptoms in the past which responded to azithromycin and some steroids.    Medications   Medication List with Changes/Refills   New Medications    AZITHROMYCIN (Z-CRISTO) 250 MG TABLET    Take 2 tablets by mouth on day 1; Take 1 tablet by mouth on days 2-5    METHYLPREDNISOLONE (MEDROL DOSEPACK) 4 MG TABLET    use as directed   Current Medications    AMIODARONE (PACERONE) 200 MG TAB    Take 1 tablet (200 mg total) by mouth 2 (two) times daily.    ATORVASTATIN (LIPITOR) 40 MG TABLET    TAKE 1 TABLET(40 MG) BY MOUTH EVERY DAY    BLOOD-GLUCOSE METER KIT    To check BG 2 times daily, to use with insurance preferred meter    CLOPIDOGREL (PLAVIX) 75 MG TABLET    TAKE 1 TABLET(75 MG) BY MOUTH DAILY    FAMOTIDINE (PEPCID) 40 MG TABLET    TAKE 1 TABLET(40 MG) BY MOUTH TWICE DAILY AS NEEDED FOR HEARTBURN    GLIMEPIRIDE (AMARYL) 1 MG TABLET    Take 1-2 tablets (1-2 mg total) by mouth before breakfast.    LANCETS (SAFETY LANCETS) 21 GAUGE MISC    1 lancet by Misc.(Non-Drug; Combo Route) route 2 (two) times daily.    LANCETS MISC    To check BG 2 times daily, to use with insurance preferred meter    NITROGLYCERIN (NITROSTAT) 0.4 MG SL TABLET    Place 0.4 mg under the tongue 4 (four) times daily as needed.    PANTOPRAZOLE (PROTONIX) 40 MG TABLET    Take 1 tablet (40 mg total) by mouth once daily.    SEMAGLUTIDE (OZEMPIC) 2 MG/DOSE (8 MG/3 ML) PNIJ    Inject 2 mg into the skin every 7 days.   Changed and/or Refilled Medications    Modified Medication Previous Medication    ACETAMINOPHEN WITH CODEINE (ACETAMINOPHEN-CODEINE) 120MG 12MG 5ML SOLN acetaminophen with codeine (ACETAMINOPHEN-CODEINE) 120mg 12mg 5mL Soln       Take 5 mLs by mouth nightly as needed.    Take 5 mLs by mouth nightly as needed.    LISINOPRIL (PRINIVIL,ZESTRIL) 5 MG TABLET lisinopriL  (PRINIVIL,ZESTRIL) 5 MG tablet       Take 1 tablet (5 mg total) by mouth once daily.    Take 1 tablet (5 mg total) by mouth once daily.       Allergies  Review of patient's allergies indicates:   Allergen Reactions    Penicillins Anaphylaxis     Past Medical history  Past Medical History:   Diagnosis Date    Anticoagulant long-term use     Eliquis    Colonic polyp 07/25/2017    Coronary artery disease     CPAP (continuous positive airway pressure) dependence     Diabetes mellitus type II     Diabetes with neurologic complications     ED (erectile dysfunction)     GERD (gastroesophageal reflux disease)     Hyperlipidemia     Hypertension     Kidney stones     Kidney stones     Morbidly obese     Paroxysmal A-fib     Renal manifestation of secondary diabetes mellitus     Sleep apnea           Surgical History  Past Surgical History:   Procedure Laterality Date    CATARACT EXTRACTION W/  INTRAOCULAR LENS IMPLANT Left 08/16/2016    Dr. Martinez    CATARACT EXTRACTION W/  INTRAOCULAR LENS IMPLANT Right 08/30/2016    Dr. Martinez    CORONARY STENT PLACEMENT      JOINT REPLACEMENT      both knees    SPINE SURGERY      discectomy    TONSILLECTOMY       Family History  Family History   Problem Relation Name Age of Onset    Dementia Mother      Cataracts Mother      Heart disease Father      No Known Problems Sister      Alcohol abuse Brother      No Known Problems Maternal Aunt      No Known Problems Maternal Uncle      No Known Problems Paternal Aunt      No Known Problems Paternal Uncle      Colon cancer Maternal Grandmother      No Known Problems Maternal Grandfather      No Known Problems Paternal Grandfather      Drug abuse Daughter      No Known Problems Daughter      No Known Problems Son      No Known Problems Son      Amblyopia Neg Hx      Blindness Neg Hx      Cancer Neg Hx      Diabetes Neg Hx      Glaucoma Neg Hx      Hypertension Neg Hx      Macular degeneration Neg Hx      Retinal detachment Neg Hx       "Strabismus Neg Hx      Stroke Neg Hx      Thyroid disease Neg Hx      Esophageal cancer Neg Hx       Social History  Social History     Tobacco Use    Smoking status: Never    Smokeless tobacco: Never   Substance Use Topics    Alcohol use: No    Drug use: No          Review of system     Review of Systems   Respiratory:  Positive for cough.      Negative except as mentioned above  Physical exam   Vital Signs  Vitals:    10/15/24 0904   BP: 124/80   Pulse: 73   Resp: (!) 96   Temp: 98.5 °F (36.9 °C)   TempSrc: Oral   SpO2: (!) 18%   Weight: (!) 162.5 kg (358 lb 4 oz)   Height: 6' 1" (1.854 m)       Physical Exam  Constitutional:       General: He is not in acute distress.     Appearance: He is not ill-appearing.   HENT:      Head: Normocephalic.   Cardiovascular:      Rate and Rhythm: Normal rate. Rhythm irregular.      Pulses: Normal pulses.      Heart sounds: Normal heart sounds. No murmur heard.     No gallop.   Pulmonary:      Effort: Pulmonary effort is normal. No respiratory distress.      Breath sounds: Normal breath sounds. No wheezing.   Abdominal:      General: There is no distension.      Palpations: Abdomen is soft.      Tenderness: There is no abdominal tenderness.   Musculoskeletal:      Right lower leg: No edema.      Left lower leg: No edema.   Neurological:      Mental Status: He is alert and oriented to person, place, and time.   Psychiatric:         Mood and Affect: Mood normal.           Wt Readings from Last 3 Encounters:   10/15/24 (!) 162.5 kg (358 lb 4 oz)   03/20/24 (!) 161.5 kg (356 lb 1.4 oz)   12/05/23 (!) 161.5 kg (356 lb 0.7 oz)          Body mass index is 47.27 kg/m².      Laboratory data and other diagnostic findings     Lab Results   Component Value Date    WBC 10.20 01/02/2024    HGB 14.4 01/02/2024    HCT 42.4 01/02/2024    MCV 90 01/02/2024     01/02/2024         Lab Results   Component Value Date    CREATININE 2.2 (H) 01/02/2024    BUN 23 01/02/2024     01/02/2024    " K 4.5 01/02/2024     01/02/2024    CO2 23 01/02/2024         Assessment and plan       Viral upper respiratory tract infection  High-risk patient due to multiple comorbidities including atrial fibrillation, CKD, morbid obesity and type 2 diabetes.  Symptoms have not responded to over-the-counter measures.    -     azithromycin (Z-CRISTO) 250 MG tablet; Take 2 tablets by mouth on day 1; Take 1 tablet by mouth on days 2-5  Dispense: 6 tablet; Refill: 0  -     methylPREDNISolone (MEDROL DOSEPACK) 4 mg tablet; use as directed  Dispense: 21 each; Refill: 0  -     acetaminophen with codeine (ACETAMINOPHEN-CODEINE) 120mg 12mg 5mL Soln; Take 5 mLs by mouth nightly as needed.  Dispense: 118 mL; Refill: 0    Acute URI    Flu vaccine need  -     influenza (adjuvanted) (Fluad) 45 mcg/0.5 mL IM vaccine (> or = 64 yo) 0.5 mL    Essential hypertension  BP Readings from Last 3 Encounters:   10/15/24 124/80   03/20/24 120/62   12/05/23 118/82   Chronic. Well-controlled. Continue lisinopril.  Patient's blood pressure control is stable.The benefits of comprehensive lifestyle modification were discussed. Patient is encouraged to continue to restrict the quantity of salt in diet and engage in regular aerobic/resistance exercise         Problem List Items Addressed This Visit          Cardiac/Vascular    Essential hypertension    Relevant Medications    lisinopriL (PRINIVIL,ZESTRIL) 5 MG tablet     Other Visit Diagnoses       Viral upper respiratory tract infection    -  Primary    Relevant Medications    azithromycin (Z-CRISTO) 250 MG tablet    methylPREDNISolone (MEDROL DOSEPACK) 4 mg tablet    acetaminophen with codeine (ACETAMINOPHEN-CODEINE) 120mg 12mg 5mL Soln    Acute URI        Flu vaccine need        Relevant Medications    influenza (adjuvanted) (Fluad) 45 mcg/0.5 mL IM vaccine (> or = 64 yo) 0.5 mL (Completed)                Medications Administered this visit  Administrations This Visit       influenza (adjuvanted) (Fluad) 45  mcg/0.5 mL IM vaccine (> or = 64 yo) 0.5 mL       Admin Date  10/15/2024 Action  Given Dose  0.5 mL Route  Intramuscular Documented By  Padma Bhakta MD    This office note was created by combination of typed  and MModal dictation.  Transcription errors may be present.  If there are any questions, please contact me.

## 2024-10-15 NOTE — PROGRESS NOTES
Health Maintenance Due   Topic Date Due    Shingles Vaccine (1 of 2) Consult PCP     RSV Vaccine (Age 60+ and Pregnant patients) (1 - Risk 60-74 years 1-dose series) Not offered at this office    Foot Exam  Consult PCP     Diabetes Urine Screening  Consult PCP     Colorectal Cancer Screening  Consult PCP     Hemoglobin A1c  Consult PCP     Influenza Vaccine (1) Consult PCP     COVID-19 Vaccine (5 - 2024-25 season) Consult PCP     Eye Exam  Consult PCP

## 2024-10-30 DIAGNOSIS — I10 ESSENTIAL HYPERTENSION: ICD-10-CM

## 2024-10-30 RX ORDER — LISINOPRIL 5 MG/1
5 TABLET ORAL DAILY
Qty: 90 TABLET | Refills: 3 | Status: SHIPPED | OUTPATIENT
Start: 2024-10-30 | End: 2025-10-30

## 2024-12-19 ENCOUNTER — TELEPHONE (OUTPATIENT)
Facility: CLINIC | Age: 74
End: 2024-12-19
Payer: MEDICARE

## 2024-12-19 DIAGNOSIS — I10 ESSENTIAL HYPERTENSION: Primary | ICD-10-CM

## 2024-12-19 DIAGNOSIS — I25.10 CORONARY ARTERY DISEASE INVOLVING NATIVE CORONARY ARTERY OF NATIVE HEART WITHOUT ANGINA PECTORIS: ICD-10-CM

## 2024-12-19 DIAGNOSIS — N17.9 AKI (ACUTE KIDNEY INJURY): ICD-10-CM

## 2024-12-19 DIAGNOSIS — E11.22 TYPE 2 DIABETES MELLITUS WITH STAGE 3B CHRONIC KIDNEY DISEASE, WITHOUT LONG-TERM CURRENT USE OF INSULIN: ICD-10-CM

## 2024-12-19 DIAGNOSIS — N18.32 TYPE 2 DIABETES MELLITUS WITH STAGE 3B CHRONIC KIDNEY DISEASE, WITHOUT LONG-TERM CURRENT USE OF INSULIN: ICD-10-CM

## 2024-12-19 RX ORDER — GLIMEPIRIDE 1 MG/1
1-2 TABLET ORAL
Qty: 180 TABLET | Refills: 3 | Status: SHIPPED | OUTPATIENT
Start: 2024-12-19 | End: 2025-12-19

## 2024-12-19 NOTE — TELEPHONE ENCOUNTER
----- Message from Med Assistant Laura sent at 12/17/2024  3:56 PM CST -----  Regarding: FW: Vernon  Called and spoke to pt, pt states he will continue seeing you, he also asked does him and his spouse has blood work on their appt on 12/30, if so can they come do labs before visit  ----- Message -----  From: Jory Wolf MD  Sent: 12/17/2024   3:00 PM CST  To: Luciano Corley Staff  Subject: RE: Wale                                       Please call pt and ask if he plans to keep seeing me for PCP or Dr. Calix so we can route the requests accordingly. I am happy to refill if he is going to keep seeing me. Thanks!  ----- Message -----  From: Leigh Crawford MA  Sent: 12/17/2024   2:52 PM CST  To: Jory Wolf MD  Subject: FW: Wale                                       See medication request. Pt scheduled for 12/30/2024 with Ms. Hogue.  ----- Message -----  From: Dwaine Arroyo  Sent: 12/17/2024   2:33 PM CST  To: Luciano Corley Staff  Subject: Wale                                           Type: RX Refill Request     Who Called:Wale      Have you contacted your pharmacy: Yes     Refill or New Rx: Refill      RX Name and Strength: glimepiride (AMARYL) 1 MG tablet, famotidine (PEPCID) 40 MG tablet//patient stated that he is out of medication and need refills sent to the pharmacy. Please reach out to the pharmacy and call the patient once done.     Preferred Pharmacy with phone number:.  [id number]  [place] [address]  Phone: [phone] Fax: [phone]     Local or Mail Order: Local     Ordering Provider: Dr. Jory Wolf     Would the patient rather a call back or a response via [facility]? Callback      Best Call Back Number: [phone]      Additional Information:

## 2024-12-19 NOTE — TELEPHONE ENCOUNTER
----- Message from Jory Wolf MD sent at 12/19/2024 10:58 AM CST -----  Regarding: RE: Wale  Yes, please let pt know labs are ordered and they may come any time during lab hours. Thanks!  ----- Message -----  From: Valentín Mendiola MA  Sent: 12/17/2024   3:58 PM CST  To: Jory Wolf MD  Subject: FW: Vernon                                       Called and spoke to pt, pt states he will continue seeing you, he also asked does him and his spouse has blood work on their appt on 12/30, if so can they come do labs before visit  ----- Message -----  From: Jory Wolf MD  Sent: 12/17/2024   3:00 PM CST  To: Luciano Corley Staff  Subject: RE: Wale                                       Please call pt and ask if he plans to keep seeing me for PCP or Dr. Calix so we can route the requests accordingly. I am happy to refill if he is going to keep seeing me. Thanks!  ----- Message -----  From: Leigh Crawford MA  Sent: 12/17/2024   2:52 PM CST  To: Jory Wolf MD  Subject: FW: Wale                                       See medication request. Pt scheduled for 12/30/2024 with Ms. Hogue.  ----- Message -----  From: Dwaine Arroyo  Sent: 12/17/2024   2:33 PM CST  To: Luciano Corley Staff  Subject: Wale                                           Type: RX Refill Request     Who Called:Wale      Have you contacted your pharmacy: Yes     Refill or New Rx: Refill      RX Name and Strength: glimepiride (AMARYL) 1 MG tablet, famotidine (PEPCID) 40 MG tablet//patient stated that he is out of medication and need refills sent to the pharmacy. Please reach out to the pharmacy and call the patient once done.     Preferred Pharmacy with phone number:.  [facility] #[id number] - [address] AT [address]  [place] [address]  Phone: [phone] Fax: [phone]     Local or Mail Order: Local     Ordering Provider: Dr. Jory Wolf     Would the patient  rather a call back or a response via [facility]? Callback      Best Call Back Number: [phone]      Additional Information:

## 2024-12-19 NOTE — TELEPHONE ENCOUNTER
Called and spoke with Mr. Calvo and informed him that the labs he was requesting for him and his wife was put in. I let him know he can come in any time Monday-Friday and get those labs drawn. I also let him know they can go to any [facility] facility and get them drawn also. Pt voiced understanding and stated he will get them done before his appointment. Appointment date and time was also confirmed with pt.

## 2024-12-27 ENCOUNTER — LAB VISIT (OUTPATIENT)
Dept: LAB | Facility: HOSPITAL | Age: 74
End: 2024-12-27
Attending: STUDENT IN AN ORGANIZED HEALTH CARE EDUCATION/TRAINING PROGRAM
Payer: MEDICARE

## 2024-12-27 DIAGNOSIS — I10 ESSENTIAL HYPERTENSION: ICD-10-CM

## 2024-12-27 DIAGNOSIS — N18.32 TYPE 2 DIABETES MELLITUS WITH STAGE 3B CHRONIC KIDNEY DISEASE, WITHOUT LONG-TERM CURRENT USE OF INSULIN: ICD-10-CM

## 2024-12-27 DIAGNOSIS — I25.10 CORONARY ARTERY DISEASE INVOLVING NATIVE CORONARY ARTERY OF NATIVE HEART WITHOUT ANGINA PECTORIS: ICD-10-CM

## 2024-12-27 DIAGNOSIS — N17.9 AKI (ACUTE KIDNEY INJURY): ICD-10-CM

## 2024-12-27 DIAGNOSIS — E11.22 TYPE 2 DIABETES MELLITUS WITH STAGE 3B CHRONIC KIDNEY DISEASE, WITHOUT LONG-TERM CURRENT USE OF INSULIN: ICD-10-CM

## 2024-12-27 LAB
ALBUMIN SERPL BCP-MCNC: 3.8 G/DL (ref 3.5–5.2)
ALP SERPL-CCNC: 102 U/L (ref 40–150)
ALT SERPL W/O P-5'-P-CCNC: 35 U/L (ref 10–44)
ANION GAP SERPL CALC-SCNC: 8 MMOL/L (ref 8–16)
AST SERPL-CCNC: 25 U/L (ref 10–40)
BASOPHILS # BLD AUTO: 0.03 K/UL (ref 0–0.2)
BASOPHILS NFR BLD: 0.4 % (ref 0–1.9)
BILIRUB SERPL-MCNC: 0.8 MG/DL (ref 0.1–1)
BNP SERPL-MCNC: 206 PG/ML (ref 0–99)
BUN SERPL-MCNC: 35 MG/DL (ref 8–23)
CALCIUM SERPL-MCNC: 10.1 MG/DL (ref 8.7–10.5)
CHLORIDE SERPL-SCNC: 111 MMOL/L (ref 95–110)
CHOLEST SERPL-MCNC: 81 MG/DL (ref 120–199)
CHOLEST/HDLC SERPL: 2.9 {RATIO} (ref 2–5)
CO2 SERPL-SCNC: 20 MMOL/L (ref 23–29)
CREAT SERPL-MCNC: 2 MG/DL (ref 0.5–1.4)
DIFFERENTIAL METHOD BLD: ABNORMAL
EOSINOPHIL # BLD AUTO: 0.2 K/UL (ref 0–0.5)
EOSINOPHIL NFR BLD: 2.5 % (ref 0–8)
ERYTHROCYTE [DISTWIDTH] IN BLOOD BY AUTOMATED COUNT: 12.6 % (ref 11.5–14.5)
EST. GFR  (NO RACE VARIABLE): 34.6 ML/MIN/1.73 M^2
ESTIMATED AVG GLUCOSE: 169 MG/DL (ref 68–131)
GLUCOSE SERPL-MCNC: 181 MG/DL (ref 70–110)
HBA1C MFR BLD: 7.5 % (ref 4–5.6)
HCT VFR BLD AUTO: 41.2 % (ref 40–54)
HDLC SERPL-MCNC: 28 MG/DL (ref 40–75)
HDLC SERPL: 34.6 % (ref 20–50)
HGB BLD-MCNC: 13.6 G/DL (ref 14–18)
IMM GRANULOCYTES # BLD AUTO: 0.03 K/UL (ref 0–0.04)
IMM GRANULOCYTES NFR BLD AUTO: 0.4 % (ref 0–0.5)
LDLC SERPL CALC-MCNC: 34 MG/DL (ref 63–159)
LYMPHOCYTES # BLD AUTO: 1.3 K/UL (ref 1–4.8)
LYMPHOCYTES NFR BLD: 19.7 % (ref 18–48)
MCH RBC QN AUTO: 30.7 PG (ref 27–31)
MCHC RBC AUTO-ENTMCNC: 33 G/DL (ref 32–36)
MCV RBC AUTO: 93 FL (ref 82–98)
MONOCYTES # BLD AUTO: 0.9 K/UL (ref 0.3–1)
MONOCYTES NFR BLD: 13.1 % (ref 4–15)
NEUTROPHILS # BLD AUTO: 4.3 K/UL (ref 1.8–7.7)
NEUTROPHILS NFR BLD: 63.9 % (ref 38–73)
NONHDLC SERPL-MCNC: 53 MG/DL
NRBC BLD-RTO: 0 /100 WBC
PLATELET # BLD AUTO: 143 K/UL (ref 150–450)
PMV BLD AUTO: 12.1 FL (ref 9.2–12.9)
POTASSIUM SERPL-SCNC: 4.4 MMOL/L (ref 3.5–5.1)
PROT SERPL-MCNC: 6.8 G/DL (ref 6–8.4)
RBC # BLD AUTO: 4.43 M/UL (ref 4.6–6.2)
SODIUM SERPL-SCNC: 139 MMOL/L (ref 136–145)
T4 FREE SERPL-MCNC: 1.75 NG/DL (ref 0.71–1.51)
TRIGL SERPL-MCNC: 95 MG/DL (ref 30–150)
TSH SERPL DL<=0.005 MIU/L-ACNC: <0.01 UIU/ML (ref 0.4–4)
WBC # BLD AUTO: 6.79 K/UL (ref 3.9–12.7)

## 2024-12-27 PROCEDURE — 84439 ASSAY OF FREE THYROXINE: CPT | Performed by: STUDENT IN AN ORGANIZED HEALTH CARE EDUCATION/TRAINING PROGRAM

## 2024-12-27 PROCEDURE — 84443 ASSAY THYROID STIM HORMONE: CPT | Performed by: STUDENT IN AN ORGANIZED HEALTH CARE EDUCATION/TRAINING PROGRAM

## 2024-12-27 PROCEDURE — 85025 COMPLETE CBC W/AUTO DIFF WBC: CPT | Performed by: STUDENT IN AN ORGANIZED HEALTH CARE EDUCATION/TRAINING PROGRAM

## 2024-12-27 PROCEDURE — 80053 COMPREHEN METABOLIC PANEL: CPT | Performed by: STUDENT IN AN ORGANIZED HEALTH CARE EDUCATION/TRAINING PROGRAM

## 2024-12-27 PROCEDURE — 83036 HEMOGLOBIN GLYCOSYLATED A1C: CPT | Performed by: STUDENT IN AN ORGANIZED HEALTH CARE EDUCATION/TRAINING PROGRAM

## 2024-12-27 PROCEDURE — 83880 ASSAY OF NATRIURETIC PEPTIDE: CPT | Performed by: STUDENT IN AN ORGANIZED HEALTH CARE EDUCATION/TRAINING PROGRAM

## 2024-12-27 PROCEDURE — 36415 COLL VENOUS BLD VENIPUNCTURE: CPT | Mod: PO | Performed by: STUDENT IN AN ORGANIZED HEALTH CARE EDUCATION/TRAINING PROGRAM

## 2024-12-27 PROCEDURE — 80061 LIPID PANEL: CPT | Performed by: STUDENT IN AN ORGANIZED HEALTH CARE EDUCATION/TRAINING PROGRAM

## 2024-12-30 ENCOUNTER — OFFICE VISIT (OUTPATIENT)
Facility: CLINIC | Age: 74
End: 2024-12-30
Payer: MEDICARE

## 2024-12-30 ENCOUNTER — TELEPHONE (OUTPATIENT)
Facility: CLINIC | Age: 74
End: 2024-12-30
Payer: MEDICARE

## 2024-12-30 VITALS
HEART RATE: 74 BPM | SYSTOLIC BLOOD PRESSURE: 110 MMHG | BODY MASS INDEX: 41.75 KG/M2 | OXYGEN SATURATION: 97 % | RESPIRATION RATE: 18 BRPM | TEMPERATURE: 98 F | DIASTOLIC BLOOD PRESSURE: 70 MMHG | WEIGHT: 315 LBS | HEIGHT: 73 IN

## 2024-12-30 DIAGNOSIS — K21.9 GASTROESOPHAGEAL REFLUX DISEASE WITHOUT ESOPHAGITIS: ICD-10-CM

## 2024-12-30 DIAGNOSIS — I25.10 CORONARY ARTERY DISEASE INVOLVING NATIVE CORONARY ARTERY OF NATIVE HEART WITHOUT ANGINA PECTORIS: ICD-10-CM

## 2024-12-30 DIAGNOSIS — E11.22 TYPE 2 DIABETES MELLITUS WITH STAGE 3B CHRONIC KIDNEY DISEASE, WITHOUT LONG-TERM CURRENT USE OF INSULIN: Primary | ICD-10-CM

## 2024-12-30 DIAGNOSIS — N18.32 TYPE 2 DIABETES MELLITUS WITH STAGE 3B CHRONIC KIDNEY DISEASE, WITHOUT LONG-TERM CURRENT USE OF INSULIN: Primary | ICD-10-CM

## 2024-12-30 DIAGNOSIS — N18.32 CKD STAGE 3B, GFR 30-44 ML/MIN: ICD-10-CM

## 2024-12-30 DIAGNOSIS — R06.02 SOBOE (SHORTNESS OF BREATH ON EXERTION): ICD-10-CM

## 2024-12-30 DIAGNOSIS — E66.01 MORBID (SEVERE) OBESITY DUE TO EXCESS CALORIES: ICD-10-CM

## 2024-12-30 DIAGNOSIS — I10 ESSENTIAL HYPERTENSION: ICD-10-CM

## 2024-12-30 PROBLEM — R07.9 CHEST PAIN: Status: ACTIVE | Noted: 2024-12-18

## 2024-12-30 PROCEDURE — 99214 OFFICE O/P EST MOD 30 MIN: CPT | Mod: S$PBB,,,

## 2024-12-30 PROCEDURE — 99215 OFFICE O/P EST HI 40 MIN: CPT | Mod: PBBFAC,PN

## 2024-12-30 PROCEDURE — 99999 PR PBB SHADOW E&M-EST. PATIENT-LVL V: CPT | Mod: PBBFAC,,,

## 2024-12-30 RX ORDER — METOPROLOL SUCCINATE 25 MG/1
1 TABLET, EXTENDED RELEASE ORAL DAILY
COMMUNITY
Start: 2024-12-19 | End: 2025-12-19

## 2024-12-30 RX ORDER — FAMOTIDINE 40 MG/1
40 TABLET, FILM COATED ORAL NIGHTLY
Qty: 90 TABLET | Refills: 3 | Status: SHIPPED | OUTPATIENT
Start: 2024-12-30 | End: 2025-12-30

## 2024-12-30 RX ORDER — APIXABAN 5 MG/1
5 TABLET, FILM COATED ORAL 2 TIMES DAILY
COMMUNITY
Start: 2024-02-26

## 2024-12-30 RX ORDER — GUAIFENESIN 1200 MG
325 TABLET, EXTENDED RELEASE 12 HR ORAL
COMMUNITY

## 2024-12-30 RX ORDER — FUROSEMIDE 20 MG/1
20 TABLET ORAL DAILY PRN
Qty: 14 TABLET | Refills: 0 | Status: SHIPPED | OUTPATIENT
Start: 2024-12-30 | End: 2025-12-30

## 2024-12-30 RX ORDER — ASPIRIN 81 MG/1
1 TABLET ORAL DAILY
COMMUNITY
Start: 2024-12-20 | End: 2025-12-20

## 2024-12-30 NOTE — TELEPHONE ENCOUNTER
----- Message from Nurse Star sent at 12/30/2024  9:22 AM CST -----  Regarding: FW: self  See refill request below  ----- Message -----  From: Claude Rivas  Sent: 12/27/2024   9:31 PM CST  To: Star Mackey LPN  Subject: RE: self                                           ----- Message -----  From: Rachna Delacruz  Sent: 12/27/2024  11:33 AM CST  To: Brent JOHNSON Staff  Subject: self                                             Type: RX Refill Request     Who Called:self     Have you contacted your pharmacy:     Refill     RX Name and Strength:famotidine (PEPCID) 40 MG tablet     Preferred Pharmacy with phone number:   [facility] #[id number] - [place] [address] AT [address]  [place] [address]  Phone: [phone] Fax: [phone]             Local or Mail Order:local     Would the patient rather a call back or a response via [facility]?call     Best Call Back Number: [phone]       Additional Information:     Thank you.

## 2024-12-30 NOTE — PROGRESS NOTES
SUBJECTIVE     Chief Complaint   Patient presents with    [facility]     Pt states he is coming in for a f/u for chest xray pt was told that his lungs are collecting fluid        HPI  Wale Holiday Umesh is a 74 y.o. male presents to clinic with his wife. Patient with multiple medical diagnoses as listed in the medical history and problem list that presents for follow-up. Patient is new to me.     HPI     History of Present Illness    CHIEF COMPLAINT:  Patient presents today to discuss recent blood test results and shortness of breath.    CARDIOVASCULAR:  He experiences shortness of breath on exertion but not at rest. He has a history of atrial fibrillation and has seven stents.    DIABETES:  His morning blood sugar is 110 and post-prandial blood sugar is 290. He has been prescribed injectable GLP-1 agonist but is not compliant with medication despite having it stored in refrigerator.    RENAL:  He has not seen a nephrologist in one year. He is currently on Lisinopril.    CURRENT MEDICATIONS:  He takes Eliquis 5 mg, Metoprolol 25 mg, Amiodarone 200 mg, Glipizide before breakfast, Tylenol, and baby aspirin. Patient endorses non-compliance with diabetic/cardiac diet.     FAMILY HISTORY:  Father  in his 60s, brother  in his 40s.    ROS:  General: -fever, -chills, -fatigue, -weight gain, -weight loss  Eyes: -vision changes, -redness, -discharge  ENT: -ear pain, -nasal congestion, -sore throat  Cardiovascular: -chest pain, -palpitations, -lower extremity edema  Respiratory: -cough, +shortness of breath on exertion  Gastrointestinal: -abdominal pain, -nausea, -vomiting, -diarrhea, -constipation, -blood in stool  Genitourinary: -dysuria, -hematuria, -frequency  Musculoskeletal: -joint pain, -muscle pain  Skin: -rash, -lesion  Neurological: -headache, -dizziness, -numbness, -tingling  Psychiatric: -anxiety, -depression, -sleep difficulty       Patient denies any shortness of breath, dizziness, headaches, N/V,  vision changes, chest pains, or palpitations at present time. NAD noted. AFVSS.     PAST MEDICAL HISTORY:  Past Medical History:   Diagnosis Date    Anticoagulant long-term use     Eliquis    Colonic polyp 07/25/2017    Coronary artery disease     CPAP (continuous positive airway pressure) dependence     Diabetes mellitus type II     Diabetes with neurologic complications     ED (erectile dysfunction)     GERD (gastroesophageal reflux disease)     Hyperlipidemia     Hypertension     Kidney stones     Kidney stones     Morbidly obese     Paroxysmal A-fib     Renal manifestation of secondary diabetes mellitus     Sleep apnea        ALLERGIES AND MEDICATIONS: updated and reviewed.  Review of patient's allergies indicates:   Allergen Reactions    Penicillins Anaphylaxis     Current Outpatient Medications   Medication Sig Dispense Refill    acetaminophen with codeine (ACETAMINOPHEN-CODEINE) 120mg 12mg 5mL Soln Take 5 mLs by mouth nightly as needed. 118 mL 0    amiodarone (PACERONE) 200 MG Tab Take 1 tablet (200 mg total) by mouth 2 (two) times daily. 60 tablet 11    aspirin (ECOTRIN) 81 MG EC tablet Take 1 tablet by mouth once daily.      atorvastatin (LIPITOR) 40 MG tablet TAKE 1 TABLET(40 MG) BY MOUTH EVERY DAY 90 tablet 3    clopidogreL (PLAVIX) 75 mg tablet TAKE 1 TABLET(75 MG) BY MOUTH DAILY 90 tablet 1    ELIQUIS 5 mg Tab Take 5 mg by mouth 2 (two) times daily.      glimepiride (AMARYL) 1 MG tablet Take 1-2 tablets (1-2 mg total) by mouth before breakfast. 180 tablet 3    lancets (SAFETY LANCETS) 21 gauge Misc 1 lancet by Misc.(Non-Drug; Combo Route) route 2 (two) times daily. 100 each 11    lancets Misc To check BG 2 times daily, to use with insurance preferred meter 100 each 0    lisinopriL (PRINIVIL,ZESTRIL) 5 MG tablet Take 1 tablet (5 mg total) by mouth once daily. 90 tablet 3    metoprolol succinate (TOPROL-XL) 25 MG 24 hr tablet Take 1 tablet by mouth once daily.      nitroGLYCERIN (NITROSTAT) 0.4 MG SL  "tablet Place 0.4 mg under the tongue 4 (four) times daily as needed.      acetaminophen 325 mg Cap Take 325 mg by mouth.      blood-glucose meter kit To check BG 2 times daily, to use with insurance preferred meter 1 each 0    famotidine (PEPCID) 40 MG tablet Take 1 tablet (40 mg total) by mouth every evening. 90 tablet 3    furosemide (LASIX) 20 MG tablet Take 1 tablet (20 mg total) by mouth daily as needed (shortness of breath). 14 tablet 0    pantoprazole (PROTONIX) 40 MG tablet Take 1 tablet (40 mg total) by mouth once daily. (Patient not taking: Reported on 12/5/2023) 30 tablet 11     No current facility-administered medications for this visit.     OBJECTIVE     Physical Exam  Vitals:    12/30/24 1346   BP: 110/70   Pulse: 74   Resp: 18   Temp: 98 °F (36.7 °C)    Body mass index is 46.41 kg/m².  Weight: (!) 159.5 kg (351 lb 11.9 oz)   Height: 6' 1" (185.4 cm)     Physical Exam  Vitals reviewed.   Constitutional:       General: He is awake. He is not in acute distress.     Appearance: Normal appearance. He is well-developed and well-groomed. He is morbidly obese. He is not ill-appearing, toxic-appearing or diaphoretic.   HENT:      Right Ear: External ear normal.      Left Ear: External ear normal.      Nose: Nose normal.      Mouth/Throat:      Lips: Pink.      Mouth: Mucous membranes are moist.   Eyes:      General: Lids are normal.      Extraocular Movements: Extraocular movements intact.      Conjunctiva/sclera: Conjunctivae normal.      Pupils: Pupils are equal, round, and reactive to light.   Cardiovascular:      Rate and Rhythm: Normal rate. Rhythm regularly irregular.      Pulses: Normal pulses.      Heart sounds: Normal heart sounds.   Pulmonary:      Effort: Pulmonary effort is normal. No tachypnea, accessory muscle usage, respiratory distress or retractions.      Breath sounds: Normal breath sounds.   Abdominal:      General: Bowel sounds are normal. There is no distension.      Palpations: Abdomen is " soft.   Musculoskeletal:         General: No swelling. Normal range of motion.      Cervical back: Normal range of motion.      Right lower leg: No edema.      Left lower leg: No edema.   Skin:     General: Skin is warm and dry.      Findings: No rash.   Neurological:      General: No focal deficit present.      Mental Status: He is alert and oriented to person, place, and time.   Psychiatric:         Mood and Affect: Mood normal.         Behavior: Behavior normal. Behavior is cooperative.         Thought Content: Thought content normal.         Judgment: Judgment normal.       Health Maintenance         Date Due Completion Date    Shingles Vaccine (1 of 2) Never done ---    RSV Vaccine (Age 60+ and Pregnant patients) (1 - Risk 60-74 years 1-dose series) Never done ---    Foot Exam 12/22/2023 12/22/2022    Diabetes Urine Screening 12/27/2023 12/27/2022    Colorectal Cancer Screening 02/26/2024 2/26/2019    COVID-19 Vaccine (5 - 2024-25 season) 09/01/2024 2/4/2022    Eye Exam 11/03/2024 11/3/2023    Hemoglobin A1c 06/27/2025 12/27/2024    Override on 11/11/2014: Done (future)    Lipid Panel 12/27/2025 12/27/2024    High Dose Statin 12/30/2025 12/30/2024    TETANUS VACCINE 08/24/2028 8/24/2018          ASSESSMENT     74 y.o. male with     1. Type 2 diabetes mellitus with stage 3b chronic kidney disease, without long-term current use of insulin    2. CKD stage 3b, GFR 30-44 ml/min    3. SOBOE (shortness of breath on exertion)    4. Essential hypertension    5. Morbid (severe) obesity due to excess calories    6. Coronary artery disease involving native coronary artery of native heart without angina pectoris        PLAN:     Wale was seen today for establish care.    Diagnoses and all orders for this visit:    Type 2 diabetes mellitus with stage 3b chronic kidney disease, without long-term current use of insulin  Patient is encouraged to follow a diet low in carbohydrates and simple sugars.  Discussed simple vs.  complex carbohydrates. Advised to focus on good food choices and increased physical activity and encouraged to adhere to medication regimen and/or lifestyle adjustments, and to check glucose level as recommended.  Contact office if glucose levels are not improving over time.  Will monitor HbA1c appropriately.    -     Ambulatory referral/consult to Nephrology; Future    CKD stage 3b, GFR 30-44 ml/min  -     Ambulatory referral/consult to Nephrology; Future    SOBOE (shortness of breath on exertion)  Ongoing. Counseled patient on monitoring for worsening symptoms, and go to ER for evaluation if symptoms present; patient verbalized understanding.    -     furosemide (LASIX) 20 MG tablet; Take 1 tablet (20 mg total) by mouth daily as needed (shortness of breath).    Essential hypertension   Advised to maintain a low Na diet(<2g/day), exercise, and keep BP log to present to next visit   -     furosemide (LASIX) 20 MG tablet; Take 1 tablet (20 mg total) by mouth daily as needed (shortness of breath).    Morbid (severe) obesity due to excess calories  - Discussed importance of eating a prudent diet and exercising?       Coronary artery disease involving native coronary artery of native heart without angina pectoris  -     furosemide (LASIX) 20 MG tablet; Take 1 tablet (20 mg total) by mouth daily as needed (shortness of breath).       Assessment & Plan    IMPRESSION:  - Reviewed kidney function: creatinine 2.0 (improved from 12 months ago), GFR 34.6 (up from 31)  - Assessed shortness of breath: likely related to cardiac issues ( Afib and overweight) and exacerbated by activity.   - Evaluated chest XR results: trace pleural effusion noted, not clinically significant at this time. Patient reports cardiologist as told him everything was fine.   - Patient reports Cardio expressed potential need for ablation procedure for AFib management  - Discussed diabetes management: A1C increased to 7.5 from 6.1 12 months ago  - Evaluated  current medications in context of kidney function    ATRIAL FIBRILLATION AND HEART FAILURE:  - Discussed relationship between cardiac conditions (congestive heart failure, AFib) and shortness of breath.  - Continued Eliquis 5 mg, baby aspirin, metoprolol 25 mg, amiodarone 200 mg.  - Patient to elevate legs when resting, but keep elevation below heart level.  - Started Lasix 20 mg, take as needed for shortness of breath, not to exceed daily (14 pills provided for 1 month).  -counseled on sodium intake and how to read labels.     PLEURAL EFFUSION:  - Explained significance of trace pleural effusion on chest XR and why it is not concerning at present.    TYPE 2 DIABETES MELLITUS:  - Provided information on the importance of diabetes management for overall health and kidney function.  - Patient to monitor blood sugar regularly using glucometer.  - Patient to improve dietary habits to better manage diabetes.  - Continued glipizide 1-2 tablets before breakfast.    CHRONIC KIDNEY DISEASE:  - Referred to nephrology urgently.    GASTRO-ESOPHAGEAL REFLUX DISEASE:  - Referred to [facility] for endoscopy and colonoscopy.    FOLLOW-UP:  - Follow up on January 30th at 2:00 PM.  - Contact the office if Las[facility]  [facility]  12/30/2024 1:51 PM    I spent a total of 30 minutes on the day of the visit.This includes face to face time and non-face to face time preparing to see the patient (eg, review of tests), obtaining and/or reviewing separately obtained history, documenting clinical information in the electronic or other health record, independently interpreting results and communicating results to the patient/family/caregiver, or care coordinator.     Follow up in about 4 weeks (around 1/27/2025) for Dr. Wolf.    This note was generated with the assistance of ambient listening technology. Verbal consent was obtained by the patient and  accompanying visitor(s) for the recording of patient appointment to facilitate this note. I attest to having reviewed and edited the generated note for accuracy, though some syntax or spelling errors may persist. Please contact the author of this note for any clarification.

## 2024-12-31 DIAGNOSIS — N18.30 STAGE 3 CHRONIC KIDNEY DISEASE, UNSPECIFIED WHETHER STAGE 3A OR 3B CKD: Primary | ICD-10-CM

## 2025-01-02 ENCOUNTER — TELEPHONE (OUTPATIENT)
Dept: NEPHROLOGY | Facility: CLINIC | Age: 75
End: 2025-01-02
Payer: MEDICARE

## 2025-01-06 ENCOUNTER — LAB VISIT (OUTPATIENT)
Dept: LAB | Facility: HOSPITAL | Age: 75
End: 2025-01-06
Payer: MEDICARE

## 2025-01-06 DIAGNOSIS — N18.30 STAGE 3 CHRONIC KIDNEY DISEASE, UNSPECIFIED WHETHER STAGE 3A OR 3B CKD: ICD-10-CM

## 2025-01-06 LAB
ALBUMIN SERPL BCP-MCNC: 3.7 G/DL (ref 3.5–5.2)
ANION GAP SERPL CALC-SCNC: 10 MMOL/L (ref 8–16)
BASOPHILS # BLD AUTO: 0.06 K/UL (ref 0–0.2)
BASOPHILS NFR BLD: 1 % (ref 0–1.9)
BUN SERPL-MCNC: 28 MG/DL (ref 8–23)
CALCIUM SERPL-MCNC: 8.9 MG/DL (ref 8.7–10.5)
CHLORIDE SERPL-SCNC: 106 MMOL/L (ref 95–110)
CO2 SERPL-SCNC: 22 MMOL/L (ref 23–29)
CREAT SERPL-MCNC: 1.8 MG/DL (ref 0.5–1.4)
DIFFERENTIAL METHOD BLD: ABNORMAL
EOSINOPHIL # BLD AUTO: 0.2 K/UL (ref 0–0.5)
EOSINOPHIL NFR BLD: 2.4 % (ref 0–8)
ERYTHROCYTE [DISTWIDTH] IN BLOOD BY AUTOMATED COUNT: 12.1 % (ref 11.5–14.5)
EST. GFR  (NO RACE VARIABLE): 39.3 ML/MIN/1.73 M^2
GLUCOSE SERPL-MCNC: 154 MG/DL (ref 70–110)
HCT VFR BLD AUTO: 41.1 % (ref 40–54)
HGB BLD-MCNC: 13 G/DL (ref 14–18)
IMM GRANULOCYTES # BLD AUTO: 0.03 K/UL (ref 0–0.04)
IMM GRANULOCYTES NFR BLD AUTO: 0.5 % (ref 0–0.5)
LYMPHOCYTES # BLD AUTO: 1.1 K/UL (ref 1–4.8)
LYMPHOCYTES NFR BLD: 17.4 % (ref 18–48)
MCH RBC QN AUTO: 29.2 PG (ref 27–31)
MCHC RBC AUTO-ENTMCNC: 31.6 G/DL (ref 32–36)
MCV RBC AUTO: 92 FL (ref 82–98)
MONOCYTES # BLD AUTO: 0.9 K/UL (ref 0.3–1)
MONOCYTES NFR BLD: 14.1 % (ref 4–15)
NEUTROPHILS # BLD AUTO: 4.1 K/UL (ref 1.8–7.7)
NEUTROPHILS NFR BLD: 64.6 % (ref 38–73)
NRBC BLD-RTO: 0 /100 WBC
PHOSPHATE SERPL-MCNC: 3.8 MG/DL (ref 2.7–4.5)
PLATELET # BLD AUTO: 133 K/UL (ref 150–450)
PMV BLD AUTO: 12.3 FL (ref 9.2–12.9)
POTASSIUM SERPL-SCNC: 4.4 MMOL/L (ref 3.5–5.1)
PTH-INTACT SERPL-MCNC: 63.6 PG/ML (ref 9–77)
RBC # BLD AUTO: 4.45 M/UL (ref 4.6–6.2)
SODIUM SERPL-SCNC: 138 MMOL/L (ref 136–145)
WBC # BLD AUTO: 6.31 K/UL (ref 3.9–12.7)

## 2025-01-06 PROCEDURE — 36415 COLL VENOUS BLD VENIPUNCTURE: CPT | Mod: PO | Performed by: NURSE PRACTITIONER

## 2025-01-06 PROCEDURE — 83970 ASSAY OF PARATHORMONE: CPT | Performed by: NURSE PRACTITIONER

## 2025-01-06 PROCEDURE — 85025 COMPLETE CBC W/AUTO DIFF WBC: CPT | Performed by: NURSE PRACTITIONER

## 2025-01-06 PROCEDURE — 80069 RENAL FUNCTION PANEL: CPT | Performed by: NURSE PRACTITIONER

## 2025-01-13 DIAGNOSIS — E78.00 PURE HYPERCHOLESTEROLEMIA: ICD-10-CM

## 2025-01-13 RX ORDER — ATORVASTATIN CALCIUM 40 MG/1
40 TABLET, FILM COATED ORAL DAILY
Qty: 90 TABLET | Refills: 3 | Status: SHIPPED | OUTPATIENT
Start: 2025-01-13 | End: 2026-01-13

## 2025-01-13 NOTE — TELEPHONE ENCOUNTER
----- Message from Tech Soledad sent at 1/13/2025 10:10 AM CST -----  Regarding: Refill request  .Type: RX Refill Request    Who Called:self     Have you contacted your pharmacy:no     Refill or New Rx: Refill    RX Name and Strength:atorvastatin (LIPITOR) 40 MG tablet    Preferred Pharmacy with phone number:.  [facility][id number] - [address] AT [address] [address]  Phone: [phone] Fax: [phone]    Local or Mail Order:local     Ordering Provider:PHIL Wolf     Would the patient rather a call back or a response via My [facility]? call    Best Call Back Number:.[phone]      Additional Information:

## 2025-01-14 DIAGNOSIS — Z00.00 ENCOUNTER FOR MEDICARE ANNUAL WELLNESS EXAM: ICD-10-CM

## 2025-01-17 ENCOUNTER — OFFICE VISIT (OUTPATIENT)
Dept: NEPHROLOGY | Facility: CLINIC | Age: 75
End: 2025-01-17
Payer: MEDICARE

## 2025-01-17 VITALS
HEART RATE: 87 BPM | WEIGHT: 315 LBS | HEIGHT: 73 IN | BODY MASS INDEX: 41.75 KG/M2 | SYSTOLIC BLOOD PRESSURE: 113 MMHG | DIASTOLIC BLOOD PRESSURE: 70 MMHG | OXYGEN SATURATION: 96 %

## 2025-01-17 DIAGNOSIS — N18.32 TYPE 2 DIABETES MELLITUS WITH STAGE 3B CHRONIC KIDNEY DISEASE, WITHOUT LONG-TERM CURRENT USE OF INSULIN: ICD-10-CM

## 2025-01-17 DIAGNOSIS — E11.22 TYPE 2 DIABETES MELLITUS WITH STAGE 3B CHRONIC KIDNEY DISEASE, WITHOUT LONG-TERM CURRENT USE OF INSULIN: ICD-10-CM

## 2025-01-17 DIAGNOSIS — N39.43 DRIBBLING: ICD-10-CM

## 2025-01-17 DIAGNOSIS — E66.01 MORBID (SEVERE) OBESITY DUE TO EXCESS CALORIES: ICD-10-CM

## 2025-01-17 DIAGNOSIS — R32 URINARY INCONTINENCE, UNSPECIFIED TYPE: Primary | ICD-10-CM

## 2025-01-17 DIAGNOSIS — N18.30 STAGE 3 CHRONIC KIDNEY DISEASE, UNSPECIFIED WHETHER STAGE 3A OR 3B CKD: ICD-10-CM

## 2025-01-17 DIAGNOSIS — N20.0 NEPHROLITH: ICD-10-CM

## 2025-01-17 PROCEDURE — G2211 COMPLEX E/M VISIT ADD ON: HCPCS | Mod: S$PBB,,, | Performed by: NURSE PRACTITIONER

## 2025-01-17 PROCEDURE — 99214 OFFICE O/P EST MOD 30 MIN: CPT | Mod: S$PBB,,, | Performed by: NURSE PRACTITIONER

## 2025-01-17 PROCEDURE — 99999 PR PBB SHADOW E&M-EST. PATIENT-LVL V: CPT | Mod: PBBFAC,,, | Performed by: NURSE PRACTITIONER

## 2025-01-17 PROCEDURE — 99215 OFFICE O/P EST HI 40 MIN: CPT | Mod: PBBFAC | Performed by: NURSE PRACTITIONER

## 2025-01-17 NOTE — PROGRESS NOTES
Subjective:       Patient ID: Wale Calvo is a 74 y.o. white male who presents for f/u  evaluation of CKD      HPI     Patient is new to me. New to clinic.  Prior pertinent chart reviewed since this is patient's first appointment with me. Presents with spouse. Referred here by Dr. Lombard.    Patient presents for new evaluation of CKD.  Baseline creatinine of 1.6-1.8 from 2019-January 2021. sCr of 2.1 in April 2021 and 2.4 in October. No recent labs.    Seen in hospital in December 2020 and diagnosed with afib and started on eliquis.  Admitted to observation in Jan 2021 with ACS. Had plans for LHC but patient left AMA prior to procedure.   Sees cardiologist (in [place]). Discussed ablation but patient is unsure. Has 7 cardiac stents.  Had IV contrast last in September.    Has been off amiodarone while waiting for refill.  Metformin d/c about two months ago.  Takes Aleve 2-3x/month.    Significant hx includes HTN, CAD, HLD, afib, T2DM since 2013, ROSE sleeps CPAP, nephrolithiasis     The patient denies taking NSAIDs, herbal supplements, or new antibiotics, recreational drugs, recent episode of dehydration, diarrhea, nausea or vomiting, acute illness.     Significant family hx includes: no known kidney disease    Last renal US: July 2020, reviewed.    Update 3/11/22:  Presents for f/u of CKD. Last seen by me in December.  Baseline sCr 2.2-2.4 vs 1.6-1.8.  Home BPs: has not been taking recently  No longer on NSAIDs.  Potassium citrate 10 meq BID started after last visit due to 24 hr urine results.  Needed to start low K diet after that due to mild  Hyperkalemia.  Says he thinks he is back in afib right now. Says he has been off the amiodarone per self for about 3 mos and is waiting for the pharmacy to refill it.  Has lost weight 20lb. [employer]ystem. High protein, low glycemic.    Update 11/8/22:  Presents for f/u of CKD.  Baseline sCr 2.2-2.4 vs 1.6-1.8. No recent labs.  Had cardiac cath a couple months ago  "due to afib in [place]. Said his creatinine "went up 2 points" after.  Has 7 stents total.  Has not been needing nitro as much as previously.  Takes NSAIDs. Was taking 3 Advil 3x/day x 3 days.    Update 2/8/23:  Presents for f/u of CKD c spouse.  Baseline sCr 2.2-2.4.  Home BPs: 120s/70s  Had taken Advil for about 4 days.  Denies NSAID use otherwise.  Working on weight loss c Ozempic.  Has been off potassium citrate.    Update 2/8/23:  Presents for f/u of CKD c spouse.  Baseline sCr 1.8-2.0.  Home BPs: 110s/70s  Has cough.    Review of Systems   Genitourinary:         Dribbling       Objective:       Blood pressure 113/70, pulse 87, height 6' 1" (1.854 m), weight (!) 159.9 kg (352 lb 8.3 oz), SpO2 96%.   Physical Exam  Vitals reviewed.   Constitutional:       General: He is not in acute distress.     Appearance: He is well-developed. He is obese.   Eyes:      Conjunctiva/sclera: Conjunctivae normal.   Neck:      Vascular: No JVD.   Cardiovascular:      Rate and Rhythm: Bradycardia present.   Pulmonary:      Effort: Pulmonary effort is normal.   Abdominal:      Tenderness: There is no right CVA tenderness or left CVA tenderness.   Musculoskeletal:      Cervical back: Neck supple.      Right lower leg: Edema (+1) present.      Left lower leg: Edema (+1) present.   Neurological:      Mental Status: He is alert and oriented to person, place, and time.   Psychiatric:         Mood and Affect: Mood normal.         Behavior: Behavior normal.         Thought Content: Thought content normal.         Judgment: Judgment normal.           Lab Results   Component Value Date    CREATININE 1.8 (H) 01/06/2025    URICACID 8.5 (H) 11/08/2022     Prot/Creat Ratio, Urine   Date Value Ref Range Status   01/06/2025 0.10 0.00 - 0.20 Final   02/08/2023 0.08 0.00 - 0.20 Final   11/08/2022 0.06 0.00 - 0.20 Final     Lab Results   Component Value Date     01/06/2025    K 4.4 01/06/2025    CO2 22 (L) 01/06/2025     01/06/2025     Lab " "Results   Component Value Date    PTH 63.6 01/06/2025    CALCIUM 8.9 01/06/2025    PHOS 3.8 01/06/2025     Lab Results   Component Value Date    HGB 13.0 (L) 01/06/2025    WBC 6.31 01/06/2025    HCT 41.1 01/06/2025      Lab Results   Component Value Date    HGBA1C 7.5 (H) 12/27/2024     (L) 01/06/2025    BUN 28 (H) 01/06/2025     Lab Results   Component Value Date    LDLCALC 34.0 (L) 12/27/2024         Assessment:       1. Urinary incontinence, unspecified type    2. Dribbling    3. Nephrolith    4. Stage 3 chronic kidney disease, unspecified whether stage 3a or 3b CKD    5. Morbid (severe) obesity due to excess calories    6. Type 2 diabetes mellitus with stage 3b chronic kidney disease, without long-term current use of insulin              Plan:   CKD stage 3 - unclear etiology, but clinically it could be r/t  DM, HTN, afib, atherosclerotic disease.  Stable. Educated patient to control BP, BG, remain well-hydrated, and avoid NSAIDs to prevent progression of CKD. Encouraged weight loss.    Uroscopy: many needle-shaped crystals. Will check uric acid level.    Declined offer for CKD ed.    UPCR Non-proteinuric. On ACEi   Acid-base Marginally low bicarb. Off K citrate.   Start sodium bicarb 650 mg BID   Renal osteodystrophy Ca, phos okay. PTH WNL. Vit D low last check and vit D 2,000 startded.   Anemia Hgb at goal for CKD   DM Suboptimal control now, but had A1c as high as 8.6 in the past.   Lipid Management On statin.   ESRD planning Previously: Anticipatory guidance provided about timing of dialysis. Start discussions and planning when eGFR is about 20 mL/min; most patients start dialysis between 5-10 mL/min.    Patient says he does not want dialysis. His step-father was on dialysis. He said, "I know what will happen if I don't get it. I will die."       HTN - WNL on amiodarone 200 mg daily bid,  lisinopril 5 mg    Nephrolithiasis - likely CaOx. Does not believe he has had recent issues.  - Was on Kcitrate 10 " meq BID but stopped due to aftertaste  - Drinking about 60-80 oz/ day (water, diet coke, sprite)  - Has been drinking crystal light and lemonade. Repeat US and consider supersaturation.    Hyperkalemia - resolved on low K diet. Off k-citrate.    Morbid obesity - Off Ozempic. Wants to lose weight naturally    All questions patient had were answered.  Asked if further questions. None. F/u in clinic in 8 mos with labs and urine prior to next visit or sooner if needed.  ER for emergency concerns.    Summary of Plan:  1. Repeat US; consider supersaturation profile  2. avoid NSAID/ bactrim/ IV contrast/ gadolinium/ aminoglycoside where possible  3. RTC in 8 mos (in person)    Visit today included increased complexity associated with the care of managing the longitudinal care of the patient due to the serious and/or complex managed problem(s) CKD.

## 2025-01-21 ENCOUNTER — PATIENT MESSAGE (OUTPATIENT)
Dept: RADIOLOGY | Facility: HOSPITAL | Age: 75
End: 2025-01-21
Payer: MEDICARE

## 2025-01-24 ENCOUNTER — HOSPITAL ENCOUNTER (OUTPATIENT)
Dept: RADIOLOGY | Facility: HOSPITAL | Age: 75
Discharge: HOME | End: 2025-01-24
Attending: NURSE PRACTITIONER
Payer: MEDICARE

## 2025-01-24 DIAGNOSIS — N20.0 NEPHROLITH: ICD-10-CM

## 2025-01-24 PROCEDURE — 76770 US EXAM ABDO BACK WALL COMP: CPT | Mod: TC

## 2025-01-24 PROCEDURE — 76770 US EXAM ABDO BACK WALL COMP: CPT | Mod: 26,,, | Performed by: RADIOLOGY

## 2025-01-27 ENCOUNTER — OFFICE VISIT (OUTPATIENT)
Dept: UROLOGY | Facility: CLINIC | Age: 75
End: 2025-01-27
Payer: MEDICARE

## 2025-01-27 VITALS — WEIGHT: 315 LBS | BODY MASS INDEX: 46.4 KG/M2

## 2025-01-27 DIAGNOSIS — N39.43 POST-VOID DRIBBLING: ICD-10-CM

## 2025-01-27 PROCEDURE — 99213 OFFICE O/P EST LOW 20 MIN: CPT | Mod: PBBFAC | Performed by: STUDENT IN AN ORGANIZED HEALTH CARE EDUCATION/TRAINING PROGRAM

## 2025-01-27 PROCEDURE — 99203 OFFICE O/P NEW LOW 30 MIN: CPT | Mod: S$PBB,,, | Performed by: STUDENT IN AN ORGANIZED HEALTH CARE EDUCATION/TRAINING PROGRAM

## 2025-01-27 PROCEDURE — 99999 PR PBB SHADOW E&M-EST. PATIENT-LVL III: CPT | Mod: PBBFAC,,, | Performed by: STUDENT IN AN ORGANIZED HEALTH CARE EDUCATION/TRAINING PROGRAM

## 2025-01-27 NOTE — PROGRESS NOTES
Patient ID: Wale Calvo is a 74 y.o. male.    Chief Complaint: post void dribbling  Referral: Olive Muhammad, NP  [id number] [place] [address]     HPI  74 y.o. who presents to the Urology clinic for evaluation of post void dribbling x 1 month. Patient denies dysuria, hematuria, nausea, vomiting. He has hx of stones, denies flank pain. Accompanied by his wife. Patient w/o hx of urinary retention.     Medically Necessary ROS documented in HPI    Past Medical History  Active Ambulatory Problems     Diagnosis Date Noted    Essential hypertension 10/23/2013    Coronary artery disease involving native coronary artery of native heart without angina pectoris 10/23/2013    Hyperlipidemia, acquired 10/23/2013    Morbid obesity with BMI of 45.0-49.9, adult 10/23/2013    Refractive error 07/01/2016    Nuclear sclerosis of both eyes 07/01/2016    DM type 2 without retinopathy 07/01/2016    Senile nuclear sclerosis 08/16/2016    Nuclear sclerosis of right eye 08/25/2016    Lumbar transverse process fracture 09/29/2016    Obstructive sleep apnea syndrome 08/25/2017    Vitreous detachment of left eye 03/22/2018    PCO (posterior capsular opacification), bilateral 03/22/2018    Pseudophakia 03/22/2018    Aortic atherosclerosis 06/25/2019    Diverticulitis of colon 09/19/2019    Hemorrhoids 09/19/2019    Seasonal allergies 09/19/2019    Dry eye syndrome of both eyes 09/19/2019    ACS (acute coronary syndrome) 01/08/2021    Paroxysmal atrial fibrillation 01/08/2021    CKD (chronic kidney disease) stage 3, GFR 30-59 ml/min 01/08/2021    Atherosclerosis of native coronary artery of native heart with angina pectoris     Morbid (severe) obesity due to excess calories 11/30/2022    Thrombocytopenia, unspecified 09/05/2023    Type 2 diabetes mellitus with stage 3b chronic kidney disease, without long-term current use of insulin 03/20/2024    Secondary hyperparathyroidism 03/20/2024    Chest pain 12/18/2024     Coronary artery disease 12/18/2024    Chest pain 12/18/2024     Resolved Ambulatory Problems     Diagnosis Date Noted    Type 2 diabetes mellitus with stage 4 chronic kidney disease, without long-term current use of insulin     GERD (gastroesophageal reflux disease) 10/20/2015    Type 2 diabetes mellitus without complication, without long-term current use of insulin 10/20/2015    DM2 (diabetes mellitus, type 2) 10/20/2015     Past Medical History:   Diagnosis Date    Anticoagulant long-term use     Colonic polyp 07/25/2017    CPAP (continuous positive airway pressure) dependence     Diabetes mellitus type II     Diabetes with neurologic complications     ED (erectile dysfunction)     Hyperlipidemia     Hypertension     Kidney stones     Kidney stones     Morbidly obese     Paroxysmal A-fib     Renal manifestation of secondary diabetes mellitus     Sleep apnea          Past Surgical History  Past Surgical History:   Procedure Laterality Date    CATARACT EXTRACTION W/  INTRAOCULAR LENS IMPLANT Left 08/16/2016    Dr. Martinez    CATARACT EXTRACTION W/  INTRAOCULAR LENS IMPLANT Right 08/30/2016    Dr. Martinez    CORONARY STENT PLACEMENT      JOINT REPLACEMENT      both knees    SPINE SURGERY      discectomy    TONSILLECTOMY         Social History       Medications    Current Outpatient Medications:     acetaminophen 325 mg Cap, Take 325 mg by mouth., Disp: , Rfl:     acetaminophen with codeine (ACETAMINOPHEN-CODEINE) 120mg 12mg 5mL Soln, Take 5 mLs by mouth nightly as needed., Disp: 118 mL, Rfl: 0    amiodarone (PACERONE) 200 MG Tab, Take 1 tablet (200 mg total) by mouth 2 (two) times daily. (Patient taking differently: Take 100 mg by mouth 3 (three) times a week. Mon/Wed/Fri), Disp: 60 tablet, Rfl: 11    aspirin (ECOTRIN) 81 MG EC tablet, Take 1 tablet by mouth once daily., Disp: , Rfl:     atorvastatin (LIPITOR) 40 MG tablet, Take 1 tablet (40 mg total) by mouth once daily., Disp: 90 tablet, Rfl: 3     blood-glucose meter kit, To check BG 2 times daily, to use with insurance preferred meter, Disp: 1 each, Rfl: 0    clopidogreL (PLAVIX) 75 mg tablet, TAKE 1 TABLET(75 MG) BY MOUTH DAILY, Disp: 90 tablet, Rfl: 1    ELIQUIS 5 mg Tab, Take 5 mg by mouth 2 (two) times daily., Disp: , Rfl:     famotidine (PEPCID) 40 MG tablet, Take 1 tablet (40 mg total) by mouth every evening. (Patient taking differently: Take 40 mg by mouth 2 (two) times daily.), Disp: 90 tablet, Rfl: 3    furosemide (LASIX) 20 MG tablet, Take 1 tablet (20 mg total) by mouth daily as needed (shortness of breath)., Disp: 14 tablet, Rfl: 0    glimepiride (AMARYL) 1 MG tablet, Take 1-2 tablets (1-2 mg total) by mouth before breakfast., Disp: 180 tablet, Rfl: 3    lancets (SAFETY LANCETS) 21 gauge Misc, 1 lancet by Misc.(Non-Drug; Combo Route) route 2 (two) times daily., Disp: 100 each, Rfl: 11    lancets Misc, To check BG 2 times daily, to use with insurance preferred meter, Disp: 100 each, Rfl: 0    lisinopriL (PRINIVIL,ZESTRIL) 5 MG tablet, Take 1 tablet (5 mg total) by mouth once daily., Disp: 90 tablet, Rfl: 3    metoprolol succinate (TOPROL-XL) 25 MG 24 hr tablet, Take 1 tablet by mouth once daily., Disp: , Rfl:     nitroGLYCERIN (NITROSTAT) 0.4 MG SL tablet, Place 0.4 mg under the tongue 4 (four) times daily as needed., Disp: , Rfl:     pantoprazole (PROTONIX) 40 MG tablet, Take 1 tablet (40 mg total) by mouth once daily. (Patient not taking: Reported on 12/5/2023), Disp: 30 tablet, Rfl: 11    Allergies  Review of patient's allergies indicates:   Allergen Reactions    Penicillins Anaphylaxis       Patient's PMH, FH, Social hx, Medications, allergies reviewed and updated as pertinent to today's visit    Objective:      Physical Exam  Constitutional:       General: He is not in acute distress.     Appearance: He is well-developed. He is not ill-appearing, toxic-appearing or diaphoretic.   HENT:      Head: Normocephalic and atraumatic.       Mouth/Throat:      Mouth: Mucous membranes are moist.   Eyes:      Conjunctiva/sclera: Conjunctivae normal.   Cardiovascular:      Rate and Rhythm: Normal rate and regular rhythm.   Pulmonary:      Effort: Pulmonary effort is normal. No respiratory distress.   Abdominal:      General: There is no distension.      Palpations: Abdomen is soft. There is no mass.      Tenderness: There is no abdominal tenderness. There is no guarding.   Musculoskeletal:         General: No swelling or deformity.      Cervical back: Neck supple.   Skin:     General: Skin is warm.      Capillary Refill: Capillary refill takes less than 2 seconds.      Findings: No rash.   Neurological:      Mental Status: He is alert and oriented to person, place, and time.      Gait: Gait normal.   Psychiatric:         Mood and Affect: Mood normal.         Thought Content: Thought content normal.         Judgment: Judgment normal.            Imaging results: personally reviewed imaging with the following findings  Left non obstructing stone, 3mm. Patient's prostate not visualized, no significant urinary retention (1/24/2025)    Prior RBUS Prostate measures 4.2 x 3.9 x 3.6 cm, enlarged. ( 12/18/21)  Assessment:       1. Post-void dribbling        Plan:       PSA obtained 1.1, 1/2/2024 reviewed, WNL  POCT UA reviewed from 1/6/2025, not concerning for blood in urine/infection  POCT bladder scan 43 cc, not significant for urinary retention  Personally reviewed RBUS from 1/24/2025 with patient  Discussed patient's penis is similar to a hose in the garden, though the flow is off there may be fluid still in the hose which can leak. Advised patient to consider milking his urethra to get the last bit of drops of urine out  He denies LUTS/RUSH symptoms, do not advise additional medication at this time    RTC 3-6 months

## 2025-01-30 ENCOUNTER — OFFICE VISIT (OUTPATIENT)
Facility: CLINIC | Age: 75
End: 2025-01-30
Payer: MEDICARE

## 2025-01-30 VITALS
BODY MASS INDEX: 41.75 KG/M2 | WEIGHT: 315 LBS | TEMPERATURE: 97 F | OXYGEN SATURATION: 97 % | HEIGHT: 73 IN | SYSTOLIC BLOOD PRESSURE: 126 MMHG | DIASTOLIC BLOOD PRESSURE: 62 MMHG | RESPIRATION RATE: 18 BRPM | HEART RATE: 101 BPM

## 2025-01-30 DIAGNOSIS — N18.32 CKD STAGE 3B, GFR 30-44 ML/MIN: Primary | ICD-10-CM

## 2025-01-30 DIAGNOSIS — I48.11 LONGSTANDING PERSISTENT ATRIAL FIBRILLATION: ICD-10-CM

## 2025-01-30 DIAGNOSIS — R06.02 SOBOE (SHORTNESS OF BREATH ON EXERTION): ICD-10-CM

## 2025-01-30 DIAGNOSIS — E66.01 MORBID (SEVERE) OBESITY DUE TO EXCESS CALORIES: ICD-10-CM

## 2025-01-30 DIAGNOSIS — E11.22 TYPE 2 DIABETES MELLITUS WITH STAGE 3B CHRONIC KIDNEY DISEASE, WITHOUT LONG-TERM CURRENT USE OF INSULIN: ICD-10-CM

## 2025-01-30 DIAGNOSIS — I25.10 CORONARY ARTERY DISEASE INVOLVING NATIVE CORONARY ARTERY OF NATIVE HEART WITHOUT ANGINA PECTORIS: ICD-10-CM

## 2025-01-30 DIAGNOSIS — N18.32 TYPE 2 DIABETES MELLITUS WITH STAGE 3B CHRONIC KIDNEY DISEASE, WITHOUT LONG-TERM CURRENT USE OF INSULIN: ICD-10-CM

## 2025-01-30 DIAGNOSIS — K21.9 GASTROESOPHAGEAL REFLUX DISEASE WITHOUT ESOPHAGITIS: ICD-10-CM

## 2025-01-30 DIAGNOSIS — J15.7 PNEUMONIA OF BOTH LUNGS DUE TO MYCOPLASMA PNEUMONIAE, UNSPECIFIED PART OF LUNG: ICD-10-CM

## 2025-01-30 DIAGNOSIS — I10 ESSENTIAL HYPERTENSION: ICD-10-CM

## 2025-01-30 PROCEDURE — 99214 OFFICE O/P EST MOD 30 MIN: CPT | Mod: S$PBB,,, | Performed by: STUDENT IN AN ORGANIZED HEALTH CARE EDUCATION/TRAINING PROGRAM

## 2025-01-30 PROCEDURE — 99215 OFFICE O/P EST HI 40 MIN: CPT | Mod: PBBFAC,PN | Performed by: STUDENT IN AN ORGANIZED HEALTH CARE EDUCATION/TRAINING PROGRAM

## 2025-01-30 PROCEDURE — 99999 PR PBB SHADOW E&M-EST. PATIENT-LVL V: CPT | Mod: PBBFAC,,, | Performed by: STUDENT IN AN ORGANIZED HEALTH CARE EDUCATION/TRAINING PROGRAM

## 2025-01-30 RX ORDER — FAMOTIDINE 40 MG/1
40 TABLET, FILM COATED ORAL 2 TIMES DAILY
Qty: 180 TABLET | Refills: 3 | Status: SHIPPED | OUTPATIENT
Start: 2025-01-30 | End: 2026-01-30

## 2025-01-30 RX ORDER — PREDNISONE 10 MG/1
TABLET ORAL
Qty: 30 TABLET | Refills: 0 | Status: SHIPPED | OUTPATIENT
Start: 2025-01-30 | End: 2025-02-11

## 2025-01-30 RX ORDER — DOXYCYCLINE 100 MG/1
100 CAPSULE ORAL 2 TIMES DAILY
Qty: 14 CAPSULE | Refills: 0 | Status: SHIPPED | OUTPATIENT
Start: 2025-01-30 | End: 2025-02-06

## 2025-01-30 RX ORDER — AMIODARONE HYDROCHLORIDE 100 MG/1
100 TABLET ORAL DAILY
Qty: 90 TABLET | Refills: 3 | Status: SHIPPED | OUTPATIENT
Start: 2025-01-30 | End: 2026-01-30

## 2025-01-30 RX ORDER — AMIODARONE HYDROCHLORIDE 200 MG/1
200 TABLET ORAL 2 TIMES DAILY
Qty: 60 TABLET | Refills: 11 | Status: CANCELLED | OUTPATIENT
Start: 2025-01-30

## 2025-01-30 NOTE — PROGRESS NOTES
SUBJECTIVE     Chief Complaint   Patient presents with    Follow-up    Cough     Pt stated he has been having this cough for about 3 weeks now       History of Present Illness    CHIEF COMPLAINT:  Patient presents today for respiratory symptoms.    RESPIRATORY:  He reports worsening shortness of breath with clear sputum production that causes choking. He denies green or yellow sputum. Chest XR at [facility] showed fluid in the lungs, including ground glass opacities indicative of mucus and blunting of the costophrenic angles suggesting pleural effusion.    MEDICATIONS:  He takes Amiodarone 100mg 3 times per week (Monday, Wednesday, and Friday). He is not taking prescribed Lasix. He reports requiring Pepsod twice daily instead of current daily prescription.    GENITOURINARY:  Recent kidney ultrasound revealed an asymptomatic small kidney stone. He denies any associated pain.      ROS:  General: -fever, -chills, -fatigue, -weight gain, -weight loss  Eyes: -vision changes, -redness, -discharge  ENT: -ear pain, -nasal congestion, -sore throat  Cardiovascular: -chest pain, -palpitations, -lower extremity edema  Respiratory: -cough, +shortness of breath  Gastrointestinal: -abdominal pain, -nausea, -vomiting, -diarrhea, -constipation, -blood in stool  Genitourinary: -dysuria, -hematuria, -frequency  Musculoskeletal: -joint pain, -muscle pain  Skin: -rash, -lesion  Neurological: -headache, -dizziness, -numbness, -tingling  Psychiatric: -anxiety, -depression, -sleep difficulty           PAST MEDICAL HISTORY:  Past Medical History:   Diagnosis Date    Anticoagulant long-term use     Eliquis    Colonic polyp 07/25/2017    Coronary artery disease     CPAP (continuous positive airway pressure) dependence     Diabetes mellitus type II     Diabetes with neurologic complications     ED (erectile dysfunction)     GERD (gastroesophageal reflux disease)     Hyperlipidemia     Hypertension     Kidney stones     Kidney stones     Morbidly  obese     Paroxysmal A-fib     Renal manifestation of secondary diabetes mellitus     Sleep apnea        ALLERGIES AND MEDICATIONS: updated and reviewed.  Review of patient's allergies indicates:   Allergen Reactions    Penicillins Anaphylaxis     Current Outpatient Medications   Medication Sig Dispense Refill    aspirin (ECOTRIN) 81 MG EC tablet Take 1 tablet by mouth once daily.      atorvastatin (LIPITOR) 40 MG tablet Take 1 tablet (40 mg total) by mouth once daily. 90 tablet 3    clopidogreL (PLAVIX) 75 mg tablet TAKE 1 TABLET(75 MG) BY MOUTH DAILY 90 tablet 1    ELIQUIS 5 mg Tab Take 5 mg by mouth 2 (two) times daily.      furosemide (LASIX) 20 MG tablet Take 1 tablet (20 mg total) by mouth daily as needed (shortness of breath). 14 tablet 0    glimepiride (AMARYL) 1 MG tablet Take 1-2 tablets (1-2 mg total) by mouth before breakfast. 180 tablet 3    lancets (SAFETY LANCETS) 21 gauge Misc 1 lancet by Misc.(Non-Drug; Combo Route) route 2 (two) times daily. 100 each 11    lancets Misc To check BG 2 times daily, to use with insurance preferred meter 100 each 0    lisinopriL (PRINIVIL,ZESTRIL) 5 MG tablet Take 1 tablet (5 mg total) by mouth once daily. 90 tablet 3    metoprolol succinate (TOPROL-XL) 25 MG 24 hr tablet Take 1 tablet by mouth once daily.      nitroGLYCERIN (NITROSTAT) 0.4 MG SL tablet Place 0.4 mg under the tongue 4 (four) times daily as needed.      acetaminophen 325 mg Cap Take 325 mg by mouth. (Patient not taking: Reported on 1/30/2025)      acetaminophen with codeine (ACETAMINOPHEN-CODEINE) 120mg 12mg 5mL Soln Take 5 mLs by mouth nightly as needed. (Patient not taking: Reported on 1/30/2025) 118 mL 0    amiodarone (PACERONE) 100 MG Tab Take 1 tablet (100 mg total) by mouth once daily. 90 tablet 3    blood-glucose meter kit To check BG 2 times daily, to use with insurance preferred meter 1 each 0    doxycycline (VIBRAMYCIN) 100 MG Cap Take 1 capsule (100 mg total) by mouth 2 (two) times daily. for  "7 days 14 capsule 0    famotidine (PEPCID) 40 MG tablet Take 1 tablet (40 mg total) by mouth 2 (two) times daily. 180 tablet 3    predniSONE (DELTASONE) 10 MG tablet Take 4 tablets (40 mg total) by mouth once daily for 3 days, THEN 3 tablets (30 mg total) once daily for 3 days, THEN 2 tablets (20 mg total) once daily for 3 days, THEN 1 tablet (10 mg total) once daily for 3 days. 30 tablet 0     No current facility-administered medications for this visit.         OBJECTIVE     Physical Exam  Vitals:    01/30/25 1417   BP: 126/62   Pulse: 101   Resp: 18   Temp: 97 °F (36.1 °C)    Body mass index is 45.56 kg/m².  Weight: (!) 156.7 kg (345 lb 5.6 oz)   Height: 6' 1" (185.4 cm)     Physical Exam  Vitals reviewed.   Constitutional:       General: He is not in acute distress.  HENT:      Right Ear: External ear normal.      Left Ear: External ear normal.      Nose: Nose normal.      Mouth/Throat:      Mouth: Mucous membranes are moist.   Eyes:      Extraocular Movements: Extraocular movements intact.      Conjunctiva/sclera: Conjunctivae normal.      Pupils: Pupils are equal, round, and reactive to light.   Pulmonary:      Effort: Pulmonary effort is normal.      Breath sounds: Rhonchi present. No wheezing or rales.   Abdominal:      General: There is no distension.      Palpations: Abdomen is soft.   Musculoskeletal:         General: No swelling. Normal range of motion.      Cervical back: Normal range of motion.   Skin:     General: Skin is warm and dry.      Findings: No rash.   Neurological:      General: No focal deficit present.      Mental Status: He is alert and oriented to person, place, and time.   Psychiatric:         Mood and Affect: Mood normal.         Behavior: Behavior normal.           Health Maintenance         Date Due Completion Date    Shingles Vaccine (1 of 2) Never done ---    RSV Vaccine (Age 60+ and Pregnant patients) (1 - Risk 60-74 years 1-dose series) Never done ---    Foot Exam 12/22/2023 " 12/22/2022    Diabetes Urine Screening 12/27/2023 12/27/2022    Colorectal Cancer Screening 02/26/2024 2/26/2019    COVID-19 Vaccine (5 - 2024-25 season) 09/01/2024 2/4/2022    Diabetic Eye Exam 11/03/2024 11/3/2023    Hemoglobin A1c 06/27/2025 12/27/2024    Override on 11/11/2014: Done (future)    Lipid Panel 12/27/2025 12/27/2024    High Dose Statin 01/30/2026 1/30/2025    TETANUS VACCINE 08/24/2028 8/24/2018              ASSESSMENT     74 y.o. male with     1. CKD stage 3b, GFR 30-44 ml/min    2. Longstanding persistent atrial fibrillation    3. SOBOE (shortness of breath on exertion)    4. Essential hypertension    5. Morbid (severe) obesity due to excess calories    6. Type 2 diabetes mellitus with stage 3b chronic kidney disease, without long-term current use of insulin    7. Coronary artery disease involving native coronary artery of native heart without angina pectoris    8. Pneumonia of both lungs due to Mycoplasma pneumoniae, unspecified part of lung    9. Gastroesophageal reflux disease without esophagitis      - Assessed respiratory symptoms and recent chest XR findings  - Considered stronger antibiotic than Z-Javier due to worsening shortness of breath  - Noted recent BNP was slightly elevated but stable  - Reviewed chest XR results showing ground glass density and blunting of angles  - Determined need for diuretic (Lasix) to address fluid in lungs  - Evaluated current amiodarone regimen and decided on prescription adjustment  PLAN:     1. Longstanding persistent atrial fibrillation  - Reviewed and confirmed the patient's current amiodarone regimen for atrial fibrillation.  - Continued amiodarone 200mg, half tablet (100mg) 3 times per week (Monday, Wednesday, Friday).  - Prescribed a refill of amiodarone 200mg tablets to be taken as 100mg 3 times per week.  - amiodarone (PACERONE) 100 MG Tab; Take 1 tablet (100 mg total) by mouth once daily.  Dispense: 90 tablet; Refill: 3    2. CKD stage 3b, GFR 30-44  ml/min  - Reviewed results of the patient's recent kidney ultrasound.  - Noted the nephrologist found one small kidney stone that is not causing issues currently.  - Determined no immediate treatment is necessary for the kidney stone.  - Advised the patient to seek medical attention if the stone starts causing pain.    3. SOBOE (shortness of breath on exertion)  - Recommend follow-up for chest XR if symptoms persist.  - X-Ray Chest PA And Lateral; Future    4. Essential hypertension  - Patient was counseled and encouraged to maintain a low sodium diet, as well as increasing physical activity.  Recommend random BP checks at home on a regular basis.  Will continue medication at this time, and follow up in 3-6 months, or sooner if blood pressure begins to increase.       5. Morbid (severe) obesity due to excess calories  - Noted. Pt counseled on diet and exercise for healthy lifestyle.    6. Type 2 diabetes mellitus with stage 3b chronic kidney disease, without long-term current use of insulin  - Patient is encouraged to follow a diet low in carbohydrates and simple sugars. Advised to focus on good food choices and increased physical activity and encouraged to adhere to medication regimen and/or lifestyle adjustments, and to check glucose level as recommended.  Contact office if glucose levels are not improving over time.  Will monitor HbA1c appropriately.     7. Coronary artery disease involving native coronary artery of native heart without angina pectoris  - Noted previous BNP (brain natriuretic peptide) was stable but slightly elevated.    8. Pneumonia of both lungs due to Mycoplasma pneumoniae, unspecified part of lung  - Evaluated the patient's symptoms and considered it to be a lower respiratory infection.  - Noted clear discharge, no fever, and absence of green or yellow sputum.  - Prescribed doxycycline, a stronger antibiotic than azithromycin, to treat the infection.  - Prescribed prednisone, a  corticosteroid, to reduce inflammation.  - Instructed the patient to contact the office if symptoms do not improve in a few days.  - Recommend follow-up for chest XR if symptoms persist.  - predniSONE (DELTASONE) 10 MG tablet; Take 4 tablets (40 mg total) by mouth once daily for 3 days, THEN 3 tablets (30 mg total) once daily for 3 days, THEN 2 tablets (20 mg total) once daily for 3 days, THEN 1 tablet (10 mg total) once daily for 3 days.  Dispense: 30 tablet; Refill: 0  - X-Ray Chest PA And Lateral; Future  - doxycycline (VIBRAMYCIN) 100 MG Cap; Take 1 capsule (100 mg total) by mouth 2 (two) times daily. for 7 days  Dispense: 14 capsule; Refill: 0    9. Gastroesophageal reflux disease without esophagitis  - Increased Pepcid to twice daily from daily.   - famotidine (PEPCID) 40 MG tablet; Take 1 tablet (40 mg total) by mouth 2 (two) times daily.  Dispense: 180 tablet; Refill: 3    Jory Wolf MD  01/31/2025 2:45 PM        Follow up in about 3 months (around 4/30/2025).    This note was generated with the assistance of ambient listening technology. Verbal consent was obtained by the patient and accompanying visitor(s) for the recording of patient appointment to facilitate this note. I attest to having reviewed and edited the generated note for accuracy, though some syntax or spelling errors may persist. Please contact the author of this note for any clarification.

## 2025-01-31 ENCOUNTER — HOSPITAL ENCOUNTER (EMERGENCY)
Facility: HOSPITAL | Age: 75
Discharge: HOME | End: 2025-01-31
Attending: EMERGENCY MEDICINE
Payer: MEDICARE

## 2025-01-31 VITALS
TEMPERATURE: 98 F | OXYGEN SATURATION: 96 % | HEART RATE: 71 BPM | SYSTOLIC BLOOD PRESSURE: 148 MMHG | HEIGHT: 73 IN | WEIGHT: 315 LBS | BODY MASS INDEX: 41.75 KG/M2 | DIASTOLIC BLOOD PRESSURE: 69 MMHG | RESPIRATION RATE: 18 BRPM
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
## 2025-01-31 DIAGNOSIS — S09.90XA INJURY OF HEAD, INITIAL ENCOUNTER: ICD-10-CM

## 2025-01-31 PROCEDURE — 25000003 PHARM REV CODE 250

## 2025-01-31 PROCEDURE — 99284 EMERGENCY DEPT VISIT MOD MDM: CPT | Mod: 25

## 2025-01-31 RX ORDER — METHOCARBAMOL 750 MG/1
750 TABLET, FILM COATED ORAL 3 TIMES DAILY
Qty: 9 TABLET | Refills: 0 | Status: SHIPPED | OUTPATIENT
Start: 2025-01-31 | End: 2025-02-03

## 2025-01-31 RX ORDER — LIDOCAINE 50 MG/G
1 PATCH TOPICAL DAILY
Qty: 14 PATCH | Refills: 0 | Status: SHIPPED | OUTPATIENT
Start: 2025-01-31

## 2025-01-31 RX ORDER — METHOCARBAMOL 750 MG/1
1500 TABLET, FILM COATED ORAL 3 TIMES DAILY
Qty: 18 TABLET | Refills: 0 | Status: SHIPPED | OUTPATIENT
Start: 2025-01-31 | End: 2025-01-31

## 2025-01-31 RX ORDER — ACETAMINOPHEN 500 MG
1000 TABLET ORAL
Status: COMPLETED | OUTPATIENT
Start: 2025-01-31 | End: 2025-01-31

## 2025-01-31 RX ADMIN — ACETAMINOPHEN 1000 MG: 500 TABLET ORAL at 02:01

## 2025-01-31 NOTE — ED PROVIDER NOTES
Encounter Date: 1/31/2025       History     Chief Complaint   Patient presents with    Fall     Pt to ER with reports of neck pain s/p trip and fall coming down the steps. Pt reports was trying to get his dog back in the house when he lost his footing falling towards his side landing between the generator and the stairs. Pt reports hit his neck and heard it crack. +dizziness.  Denies LOC. + blood thinners. C-collar placed in triage     A 74-year-old male with past medical history of diabetes, hypertension, hyperlipidemia, AFib presents to the emergency department to be evaluated after a fall.  Patient states he was on the last step of his stairs chasing after his dog in misstepped and slipped.  Patient states he hit the right side of his head on the ground.  Patient is currently complaining of neck pain and pain on the right side of his head.  Patient does take blood thinners for his AFib.  Patient denies loss of consciousness.  No vision change, nausea, or vomiting following the fall.  Patient denies any dizziness or confusion.  Patient states his right hip also hit the ground following his head, but he states he is not having any pain in the right hip currently.  Patient is able to ambulate without any difficulty following the fall.  Patient is not having any back pain.  Denies chest pain, shortness of breath, abdominal pain. NKDA.         Review of patient's allergies indicates:   Allergen Reactions    Penicillins Anaphylaxis     Past Medical History:   Diagnosis Date    Anticoagulant long-term use     Eliquis    Colonic polyp 07/25/2017    Coronary artery disease     CPAP (continuous positive airway pressure) dependence     Diabetes mellitus type II     Diabetes with neurologic complications     ED (erectile dysfunction)     GERD (gastroesophageal reflux disease)     Hyperlipidemia     Hypertension     Kidney stones     Kidney stones     Morbidly obese     Paroxysmal A-fib     Renal manifestation of secondary  diabetes mellitus     Sleep apnea      Past Surgical History:   Procedure Laterality Date    CATARACT EXTRACTION W/  INTRAOCULAR LENS IMPLANT Left 08/16/2016    Dr. Martinez    CATARACT EXTRACTION W/  INTRAOCULAR LENS IMPLANT Right 08/30/2016    Dr. Martinez    CORONARY STENT PLACEMENT      JOINT REPLACEMENT      both knees    SPINE SURGERY      discectomy    TONSILLECTOMY       Family History   Problem Relation Name Age of Onset    Dementia Mother      Cataracts Mother      Heart disease Father      No Known Problems Sister      Alcohol abuse Brother      No Known Problems Maternal Aunt      No Known Problems Maternal Uncle      No Known Problems Paternal Aunt      No Known Problems Paternal Uncle      Colon cancer Maternal Grandmother      No Known Problems Maternal Grandfather      No Known Problems Paternal Grandfather      Drug abuse Daughter      No Known Problems Daughter      No Known Problems Son      No Known Problems Son      Amblyopia Neg Hx      Blindness Neg Hx      Cancer Neg Hx      Diabetes Neg Hx      Glaucoma Neg Hx      Hypertension Neg Hx      Macular degeneration Neg Hx      Retinal detachment Neg Hx      Strabismus Neg Hx      Stroke Neg Hx      Thyroid disease Neg Hx      Esophageal cancer Neg Hx       Social History     Tobacco Use    Smoking status: Never    Smokeless tobacco: Never   Substance Use Topics    Alcohol use: No    Drug use: No     Review of Systems   Constitutional:  Negative for fever.   HENT:  Negative for sore throat.    Eyes:  Negative for redness.   Respiratory:  Negative for shortness of breath.    Cardiovascular:  Negative for chest pain.   Gastrointestinal:  Negative for abdominal pain, nausea and vomiting.   Genitourinary:  Negative for dysuria.   Musculoskeletal:  Positive for arthralgias, myalgias and neck pain. Negative for back pain, gait problem, joint swelling and neck stiffness.   Skin:  Negative for rash.   Neurological:  Negative for dizziness, speech  difficulty, weakness, light-headedness and numbness.   Hematological:  Does not bruise/bleed easily.   Psychiatric/Behavioral:  Negative for confusion.        Physical Exam     Initial Vitals [01/31/25 1319]   BP Pulse Resp Temp SpO2   (!) 148/69 71 18 97.5 °F (36.4 °C) 96 %      MAP       --         Physical Exam    Constitutional: He appears well-developed and well-nourished.  Non-toxic appearance. He does not have a sickly appearance. No distress.   HENT:   Head: Normocephalic. Head is without raccoon's eyes, without Aponte's sign, without contusion and without laceration.   Right Ear: External ear normal.   Left Ear: External ear normal. Mouth/Throat: No trismus in the jaw.   Eyes: Conjunctivae, EOM and lids are normal. Pupils are equal, round, and reactive to light.   Neck: Trachea normal. Neck supple. No tracheal deviation present.   Normal range of motion.   Full passive range of motion without pain.     Cardiovascular:  Normal rate, regular rhythm, S1 normal, S2 normal and normal heart sounds.     Exam reveals no S3 and no S4.       Pulmonary/Chest: Effort normal and breath sounds normal. No tachypnea and no bradypnea. No respiratory distress. He has no decreased breath sounds. He has no wheezes. He has no rhonchi. He has no rales.   Abdominal: Abdomen is soft. Bowel sounds are normal. He exhibits no distension. There is no abdominal tenderness.   No bruising or signs of internal bleeding on the abdomen.   No right CVA tenderness.  No left CVA tenderness. There is no rebound, no guarding, no tenderness at McBurney's point and negative Boles's sign.   Musculoskeletal:      Cervical back: Full passive range of motion without pain, normal range of motion and neck supple. No rigidity. Spinous process tenderness and muscular tenderness present. Normal range of motion.      Comments: No spinous process tenderness in thoracic or lumbar region.  Patient ambulating fine.Patient is able to move all extremities. 5/5  strength at upper and lower extremities.        Neurological: He is alert and oriented to person, place, and time. GCS eye subscore is 4. GCS verbal subscore is 5. GCS motor subscore is 6.   Skin: Skin is warm and dry. No rash noted.   No obvious bruising or abrasions.  No bruising or signs of injury on the right hip.   Psychiatric: He has a normal mood and affect. His behavior is normal. Judgment and thought content normal.         ED Course   Procedures  Labs Reviewed - No data to display       Imaging Results              CT Head Without Contrast (Final result)  Result time 01/31/25 14:04:36      Final result by Ramon Dumont DO (01/31/25 14:04:36)                   Impression:      CT head: No acute intracranial findings as detailed above specifically without evidence for acute intracranial hemorrhage or sulcal effacement to suggest large territory recent infarction.  Further evaluation as warranted clinically..    CT cervical spine: Multilevel degenerative change of the cervical spine without evidence for acute fracture or traumatic subluxation    See above for additional details.    Further evaluation as warranted clinically.      Electronically signed by: Ramon Dumont DO  Date:    01/31/2025  Time:    14:04               Narrative:    EXAMINATION:  CT HEAD WITHOUT CONTRAST; CT CERVICAL SPINE WITHOUT CONTRAST    CLINICAL HISTORY:  Head trauma, minor (Age >= 65y);; Neck trauma (Age >= 65y);    TECHNIQUE:  CT head: Multiple sequential 5 mm axial images of the head without contrast.  Coronal and sagittal reformatted imaging from the axial acquisition.    CT cervical spine: Multiple sequential 1.25 mm axial images of the cervical spine without contrast.  Coronal and sagittal reformatted imaging from the axial acquisition.    COMPARISON:  CT head 09/05/2020    FINDINGS:  CT head: There is no evidence for acute intracranial hemorrhage or sulcal effacement.  The ventricles are normal in size without  hydrocephalus.  There is no midline shift or mass effect.  Visualized paranasal sinuses and mastoid air cells are clear.    CT cervical spine:    Straightening of the expected cervical lordosis.  There is prominent ventral osteophytes at all levels concerning for possible diffuse idiopathic skeletal hyperostosis.  There is interrupted hyperdensity along the posterior aspect of the few of the cervical vertebra most pronounced at C5 level component of partial ossification posterior longitudinal ligament not excluded.  There is no evidence for acute fracture or subluxation cervical spine.  Advanced degenerative change of the Lantus dental joint    There is probable bony bridging across the C5/C6 disc space    Allowing for artifact from motion and beam hardening degenerative change most pronounced at C3/C4 with prominent posterior disc osteophyte complex, uncovertebral joint hypertrophy and facet arthropathy with mild moderate central canal stenosis with moderate right and mild left bony neural foraminal stenosis    No consolidation visualized lung apices.                                       CT Cervical Spine Without Contrast (Final result)  Result time 01/31/25 14:04:36      Final result by Ramon Dumont DO (01/31/25 14:04:36)                   Impression:      CT head: No acute intracranial findings as detailed above specifically without evidence for acute intracranial hemorrhage or sulcal effacement to suggest large territory recent infarction.  Further evaluation as warranted clinically..    CT cervical spine: Multilevel degenerative change of the cervical spine without evidence for acute fracture or traumatic subluxation    See above for additional details.    Further evaluation as warranted clinically.      Electronically signed by: Ramon Dumont DO  Date:    01/31/2025  Time:    14:04               Narrative:    EXAMINATION:  CT HEAD WITHOUT CONTRAST; CT CERVICAL SPINE WITHOUT CONTRAST    CLINICAL  HISTORY:  Head trauma, minor (Age >= 65y);; Neck trauma (Age >= 65y);    TECHNIQUE:  CT head: Multiple sequential 5 mm axial images of the head without contrast.  Coronal and sagittal reformatted imaging from the axial acquisition.    CT cervical spine: Multiple sequential 1.25 mm axial images of the cervical spine without contrast.  Coronal and sagittal reformatted imaging from the axial acquisition.    COMPARISON:  CT head 09/05/2020    FINDINGS:  CT head: There is no evidence for acute intracranial hemorrhage or sulcal effacement.  The ventricles are normal in size without hydrocephalus.  There is no midline shift or mass effect.  Visualized paranasal sinuses and mastoid air cells are clear.    CT cervical spine:    Straightening of the expected cervical lordosis.  There is prominent ventral osteophytes at all levels concerning for possible diffuse idiopathic skeletal hyperostosis.  There is interrupted hyperdensity along the posterior aspect of the few of the cervical vertebra most pronounced at C5 level component of partial ossification posterior longitudinal ligament not excluded.  There is no evidence for acute fracture or subluxation cervical spine.  Advanced degenerative change of the Lantus dental joint    There is probable bony bridging across the C5/C6 disc space    Allowing for artifact from motion and beam hardening degenerative change most pronounced at C3/C4 with prominent posterior disc osteophyte complex, uncovertebral joint hypertrophy and facet arthropathy with mild moderate central canal stenosis with moderate right and mild left bony neural foraminal stenosis    No consolidation visualized lung apices.                                       Medications   acetaminophen tablet 1,000 mg (1,000 mg Oral Given 1/31/25 1430)     Medical Decision Making  Encounter Date: 1/31/2025    74 y.o. male presents for evaluation of neck pain and headache following a fall.  Hemodynamically stable. Afebrile.  Phonating and protecting the airway spontaneously. No clinical evidence for cardiovascular instability or impending airway compromise.  Remainder of physical exam as above. Notable for cervical spinous process tenderness.  Prior medical records reviewed. PMD note reviewed. Current co-morbidities considered that will impact clinical decision making include as above.   Vitals range:   Temp:  [97.5 °F (36.4 °C)] 97.5 °F (36.4 °C)  Pulse:  [71] 71  Resp:  [18] 18  SpO2:  [96 %] 96 %  BP: (148)/(69) 148/69    Differential diagnoses includes but is not limited to: SAH, Epidural Hematoma, Subdural Hematoma, C/T/L Spinal fracture/subluxation, spinal cord injury, skull and maxillofacial fractures, muscle sprain/strain, contusion    Initial and repeat neuro exam benign.  CT cervical spine and CT head without contrast showed no evidence of SAH, epidural hematoma, subdural hematoma, cervical spinal fracture or subluxation.  Patient is not tender at thoracic or lumbar spine.  I have low suspicion for fracture or subluxation of thoracic or lumbar spine.  Patient has no lower neurological deficits.  Patient is able to ambulate without difficulty.  5/5 in the upper and lower extremities.  Neurovascularly intact.  I have low suspicion for spinal cord injury.  No bruising or abrasions to the face or skull.  No raccoon eyes or burns sign.  I have low suspicion for skull fracture or maxillofacial fractures.    Patient given Tylenol in the emergency department.  Patient states he does not want any narcotic pain medication.  I will discharge patient home with Tylenol, Robaxin, and lidocaine patches.  I counseled patient on use of Robaxin.  I told patient him that the medication may make him drowsy and not drive or operate heavy machinery while taking Robaxin.  I advised patient to use ice and heating pad on neck.  I also advised patient to follow up with primary care doctor.    ED MEDS GIVEN:  Medications  acetaminophen tablet 1,000 mg  (1,000 mg Oral Given 1/31/25 1430)    Clinical Impression:  Final diagnoses:  [W19.XXXA] Fall, initial encounter (Primary)  [S09.90XA] Injury of head, initial encounter    I discussed with the patient/family the diagnosis, treatment plan, indications for return to the emergency department, and for expected follow-up. The patient/family verbalized an understanding. The patient/family is asked if there are any questions or concerns. We discuss the case, until all issues are addressed to the patient/family's satisfaction. Patient/family understands and is agreeable to the plan.   Eugenio Cazares    DISCLAIMER: This note was prepared with Le[employer] voice recognition transcription software. Garbled syntax, mangled pronouns, and other bizarre constructions may be attributed to that software system.      Amount and/or Complexity of Data Reviewed  Radiology: ordered.    Risk  OTC drugs.  Prescription drug management.                                      Clinical Impression:  Final diagnoses:  [W19.XXXA] Fall, initial encounter (Primary)  [S09.90XA] Injury of head, initial encounter          ED Disposition Condition    Discharge Stable          ED Prescriptions       Medication Sig Dispense Start Date End Date Auth. Provider    methocarbamoL (ROBAXIN) 750 MG Tab  (Status: Discontinued) Take 2 tablets (1,500 mg total) by mouth 3 (three) times daily. for 3 days 18 tablet 1/31/2025 1/31/2025 Eugenio Cazares PA-C    LIDOcaine (LIDODERM) 5 % Place 1 patch onto the skin once daily. Remove & Discard patch within 12 hours or as directed by MD 14 patch 1/31/2025 -- Eugenio Cazares PA-C    methocarbamoL (ROBAXIN) 750 MG Tab Take 1 tablet (750 mg total) by mouth 3 (three) times daily. for 3 days 9 tablet 1/31/2025 2/3/2025 Eugenio Cazares PA-C          Follow-up Information       Follow up With Specialties Details Why Contact Info    Jory Wolf MD Family Medicine Schedule an appointment as soon as possible for a visit  For  further evaluation, more definitive management of symptoms [address]  [address]  [facility] [address]  [phone]      [facility] - Emergency Dept Emergency Medicine Go to  If symptoms worsen [address] [facility] [place] [address] [phone]             Eugenio Cazares PA-C  01/31/25 1800       Eugenio Cazares PA-C  01/31/25 1802

## 2025-01-31 NOTE — DISCHARGE INSTRUCTIONS
Take medications as prescribed for symptomatic care.  Use ice and heating pad on neck.  Follow up with primary care doctor.  Muscle relaxers can be sedating.  Do not drive or operate heavy machinery.  Thank you for coming to our Emergency Department today. It is important to remember that some problems or medical conditions are difficult to diagnose and may not be found or addressed during your Emergency Department visit.  These conditions often start with non-specific symptoms and can only be diagnosed on follow up visits with your primary care physician or specialist when the symptoms continue or change. Please remember that all medical conditions can change, and we cannot predict how you will be feeling tomorrow or the next day. Return to the ER with any questions/concerns, new/concerning symptoms, worsening or failure to improve.       Be sure to follow up with your primary care doctor and review all labs/imaging/tests that were performed during your ER visit with them. It is very common for us to identify non-emergent incidental findings which must be followed up with your primary care physician.  Some labs/imaging/tests may be outside of the normal range, and require non-emergent follow-up and/or further investigation/treatment/procedures/testing to help diagnose/exclude/prevent complications or other potentially serious medical conditions. Some abnormalities may not have been discussed or addressed during your ER visit. Some lab results may not return during your ER visit but can be accessible by downloading the free [facility] or by visiting [email or web address] . It is important for you to review all labs/imaging/tests which are outside of the normal range with your physician.    An ER visit does not replace a primary care visit, and many screening tests or follow-up tests cannot be ordered by an ER doctor or performed by the ER. Some tests may even require pre-approval.    If you do not have a  primary care doctor, you may contact the one listed on your discharge paperwork or you may also call the [facility] Appointment Desk at [phone] , or [address] [place] [address] at  [phone] to schedule an appointment, or establish care with a primary care doctor or even a specialist and to obtain information about local resources. It is important to your health that you have a primary care doctor.    Please take all medications as directed. We have done our best to select a medication for you that will treat your condition however, all medications may potentially have side-effects and it is impossible to predict which medications may give you side-effects or what those side-effects (if any) those medications may give you.  If you feel that you are having a negative effect or side-effect of any medication you should stop taking those medications immediately and seek medical attention. If you feel that you are having a life-threatening reaction call 911.        Do not drive, swim, climb to height, take a bath, operate heavy machinery, drink alcohol or take potentially sedating medications, sign any legal documents or make any important decisions for 24 hours if you have received any pain medications, sedatives or mood altering drugs during your ER visit or within 24 hours of taking them if they have been prescribed to you.     You can find additional resources for Dentists, hearing aids, durable medical equipment, low cost pharmacies and other resources at [email or web address]

## 2025-02-11 ENCOUNTER — OFFICE VISIT (OUTPATIENT)
Dept: FAMILY MEDICINE | Facility: CLINIC | Age: 75
End: 2025-02-11
Payer: MEDICARE

## 2025-02-11 VITALS
DIASTOLIC BLOOD PRESSURE: 82 MMHG | RESPIRATION RATE: 16 BRPM | WEIGHT: 315 LBS | TEMPERATURE: 98 F | HEART RATE: 82 BPM | OXYGEN SATURATION: 98 % | HEIGHT: 73 IN | BODY MASS INDEX: 41.75 KG/M2 | SYSTOLIC BLOOD PRESSURE: 138 MMHG

## 2025-02-11 DIAGNOSIS — E66.01 MORBID OBESITY WITH BMI OF 45.0-49.9, ADULT: ICD-10-CM

## 2025-02-11 DIAGNOSIS — Z00.00 ENCOUNTER FOR MEDICARE ANNUAL WELLNESS EXAM: Primary | ICD-10-CM

## 2025-02-11 DIAGNOSIS — G47.33 OBSTRUCTIVE SLEEP APNEA SYNDROME: ICD-10-CM

## 2025-02-11 DIAGNOSIS — R06.02 SOB (SHORTNESS OF BREATH): ICD-10-CM

## 2025-02-11 DIAGNOSIS — I10 ESSENTIAL HYPERTENSION: ICD-10-CM

## 2025-02-11 DIAGNOSIS — Z71.89 ADVANCED DIRECTIVES, COUNSELING/DISCUSSION: ICD-10-CM

## 2025-02-11 DIAGNOSIS — E78.5 HYPERLIPIDEMIA, ACQUIRED: ICD-10-CM

## 2025-02-11 DIAGNOSIS — N18.31 STAGE 3A CHRONIC KIDNEY DISEASE: ICD-10-CM

## 2025-02-11 DIAGNOSIS — N18.32 TYPE 2 DIABETES MELLITUS WITH STAGE 3B CHRONIC KIDNEY DISEASE, WITHOUT LONG-TERM CURRENT USE OF INSULIN: ICD-10-CM

## 2025-02-11 DIAGNOSIS — E11.22 TYPE 2 DIABETES MELLITUS WITH STAGE 3B CHRONIC KIDNEY DISEASE, WITHOUT LONG-TERM CURRENT USE OF INSULIN: ICD-10-CM

## 2025-02-11 PROCEDURE — 99215 OFFICE O/P EST HI 40 MIN: CPT | Mod: PBBFAC,PO

## 2025-02-11 PROCEDURE — 99999 PR PBB SHADOW E&M-EST. PATIENT-LVL V: CPT | Mod: PBBFAC,,,

## 2025-02-11 RX ORDER — SODIUM, POTASSIUM,MAG SULFATES 17.5-3.13G
SOLUTION, RECONSTITUTED, ORAL ORAL
COMMUNITY
Start: 2025-01-28

## 2025-02-11 NOTE — PROGRESS NOTES
Health Maintenance Due   Topic     Shingles Vaccine (1 of 2) hx chickenpox ; inform pt can get vaccine at pharmacy.    RSV Vaccine (Age 60+ and Pregnant patients) (1 - Risk 60-74 years 1-dose series) Not given at this facility       Foot Exam  CONSULT WITH PCP    Diabetes Urine Screening      Colorectal Cancer Screening  CONSULT WITH PCP    COVID-19 Vaccine (5 - 2024-25 season)     Diabetic Eye Exam

## 2025-02-11 NOTE — PROGRESS NOTES
HPI     Chief Complaint:  AWV    Wale Calvo is a 74 y.o. male with multiple medical diagnoses as listed in the medical history and problem list that presented for a Medicare AWV and comprehensive Health Risk Assessment today.      The following components were reviewed and updated:    Medical history  Family History  Social history  Allergies and Current Medications  Health Risk Assessment  Health Maintenance  Care Team     HPI  History of Present Illness    CHIEF COMPLAINT:  Wale presents with chest tightness and shortness of breath with exertion    RESPIRATORY:  He reports persistent cough with sinus drainage causing choking episodes, typically requiring azithromycin for 7 days though symptoms tend to linger. He uses CPAP at home for sleep. Pleural effusion was noted on imaging.    CARDIOVASCULAR:  He has a history of cardiac stents. Recent cardiac catheterization demonstrated normal coronary artery flow. Echocardiogram in 2021 was normal. He is currently on dual antiplatelet therapy and takes Lasix intermittently due to concerns about kidney function.    DIABETIC NEUROPATHY:  He experiences neuropathy in feet with intermittent sharp pain primarily affecting the toes, particularly the great toe.    LABS:  CBC shows low RBCs, hemoglobin, and platelet counts. CO2 level was 1 point below normal range. Cholesterol panel shows low total cholesterol, HDL, and very low LDL.    SOCIAL HISTORY:  Former smoker during college years, denies current tobacco use.      ROS:  General: -fever, -chills, -fatigue, -weight gain, -weight loss  Eyes: -vision changes, -redness, -discharge  ENT: -ear pain, -nasal congestion, -sore throat  Cardiovascular: -chest pain, -palpitations, -lower extremity edema, +chest tightness  Respiratory: +cough, +shortness of breath  Gastrointestinal: -abdominal pain, -nausea, -vomiting, -diarrhea, -constipation, -blood in stool  Genitourinary: -dysuria, -hematuria,  -frequency  Musculoskeletal: -joint pain, -muscle pain  Skin: -rash, -lesion  Neurological: -headache, -dizziness, -numbness, -tingling  Psychiatric: -anxiety, -depression, -sleep difficulty             Assessment & Plan     Assessment & Plan    Reviewed recent lab results, noting slightly low RBC, hemoglobin, and platelet counts  Assessed chronic kidney disease progression, noting improvement in kidney function  Evaluated cholesterol levels, finding low total and LDL cholesterol  Considered pulmonary evaluation due to reported chest tightness and shortness of breath  Noted patient is seeing cardiology frequently    FOLLOW UP:  - Follow up with Dr. Wolf to discuss recent blood test results.         Diagnoses and health risks identified today and associated recommendations/orders:    Problem List Items Addressed This Visit       Essential hypertension  CARDIOVASCULAR HEALTH:  - Noted the presence of stents in the patient's heart.  - Documented that the patient is on two anticoagulants, which cause easy bruising.  - Evaluated the patient's reported chest tightness and shortness of breath during physical activity.  - Reviewed recent heart catheterization results, which showed no issues with heart flow.  - Noted that the patient was recommended to get an IV diuretic and stress test but opted for a heart catheterization instead.    Hyperlipidemia, acquired  HYPERLIPIDEMIA:  - Assessed cholesterol levels, noting low total cholesterol, HDL, and LDL.  - Recommend omega-3 supplements and regular exercise to boost HDL cholesterol levels.  - Discussed the benefits of omega-3 supplements for improving HDL cholesterol.  - Discussed benefits of exercise for improving HDL cholesterol.  - Recommend increasing omega-3 intake through consumption of fatty fish like salmon.    Morbid obesity with BMI of 45.0-49.9, adult  Dietary Guidance: Recommended caloric intake tailored to patient needs, emphasizing nutrient-dense, low-calorie  foods. Discussed portion control and meal planning strategies.  Physical Activity: Encouraged moderate physical activity for at least 150 minutes per week, tailored to patients current physical abilities and goals.  Behavioral Strategies: Addressed behaviors contributing to weight gain and set actionable goals, including [food diary, mindful eating, avoiding late-night snacks, etc.]  Motivational Counseling: Discussed the importance of intrinsic and extrinsic motivators in achieving sustained weight management. Reinforced positive behavior changes.  Follow-Up Plan: Patient instructed to return for follow-up for progress evaluation and continued counseling.  Time Spent: A total of 16 minutes was spent in face-to-face behavioral counseling focused on weight loss and obesity management.    CKD (chronic kidney disease) stage 3, GFR 30-59 ml/min  KIDNEY FUNCTION:  - Discussed the relationship between kidney function and urea levels with the patient.  - Continued Lasix prescription with instructions to take in spurts to protect kidney function.  - Ordered urine microalbumin test to assess kidney function.  - Noted improvement in kidney function from previous levels.  - Reviewed recent protein creatinine ratio test results, which have been trending well for the past 3 years.  - Documented a remark about the protein test and the order setup for future reference.  - Noted the patient has only one remaining kidney.      Type 2 diabetes mellitus with stage 3b chronic kidney disease, without long-term current use of insulin  cutting simple sugars out of diet (white breads, candies, cookies, cakes, etc.) and reducing/eliminating intake of highly processed trans fatty acids.  Exercise 30 minutes a day for 4-5 days a week. -  can help increase HDL along with taking Omega 3  Eat more fiber.  NEUROPATHY:  - Evaluated the patient's neuropathy symptoms in feet, including burning, numbness, tingling, and sharp pain, especially in the  toes.  - Prescribed gabapentin for neuropathy management.  - Inquired about the use of special shoes or inserts to alleviate foot pressure, but the patient does not currently use any.    CATARACTS:  - Documented patient's history of cataracts.  - Noted that the patient is due for an annual eye exam.  - Confirmed that the patient sees Dr. Cruz for eye screenings.    Encounter for Medicare annual wellness exam - Primary  Counseled on age appropriate medical preventative services including age appropriate cancer screenings, age appropriate eye and dental exams, over all nutritional health, need for a consistent exercise regimen, and an over all push towards maintaining a vigorous and active lifestyle. Counseled on age appropriate vaccines and discussed upcoming health care needs based on age/gender. Discussed good sleep hygiene and stress management.     Advanced directives, counseling/discussion  I offered to discuss advanced care planning, including how to pick a person who would make decisions for you if you were unable to make them for yourself, called a health care power of , and what kind of decisions you might make such as use of life sustaining treatments such as ventilators and tube feeding when faced with a life limiting illness recorded on a living will that they will need to know. (How you want to be cared for as you near the end of your natural life)     X Patient is interested in learning more about how to make advanced directives.  I provided them paperwork and offered to discuss this with them.    SOB (shortness of breath)    Relevant Orders    Ambulatory referral/consult to Pulmonology  PULMONARY ISSUES:  - Planned to place a referral for pulmonology.  - Noted that the patient has been taking Lasix (diuretic) intermittently.  - Referred the patient to pulmonology for evaluation of shortness of breath experienced after walking dogs.  - Explained that low CO2 levels are likely not clinically  "significant.  - Documented the patient's history of fluid on the outside of the lung.    Obstructive sleep apnea syndrome  SLEEP APNEA:  - Confirmed that the patient uses a CPAP machine at home for sleep apnea management.      --------------------------------------------    ** See Completed Assessments for Annual Wellness Visit within the encounter summary.**    The following assessments were completed:  Living Situation  CAGE  Depression Screening  Timed Get Up and Go  Whisper Test  Cognitive Function Screening  Nutrition Screening  ADL Screening  PAQ Screening      [facility]ided [facility]  with a 5-10 year written screening schedule and personal prevention plan. Recommendations were developed using the USPSTF age appropriate recommendations. Education, counseling, and referrals were provided as needed. After Visit Summary printed and given to patient which includes a list of additional screenings\tests needed.      Opioid documentation:      Patient does not have a current opioid prescription.        Exam     Review of Systems:  (as noted above)  Review of Systems    Physical Exam:   Physical Exam  Vitals:    02/11/25 1003   BP: 138/82   BP Location: Left arm   Patient Position: Sitting   Pulse: 82   Resp: 16   Temp: 98 °F (36.7 °C)   TempSrc: Oral   SpO2: 98%   Weight: (!) 158.9 kg (350 lb 5 oz)   Height: 6' 1" (1.854 m)      Body mass index is 46.22 kg/m².    Physical Exam    General: No acute distress. Well-developed. Well-nourished.  Eyes: EOMI. Sclerae anicteric.  HENT: Normocephalic. Atraumatic. Nares patent.  Cardiovascular: Regular rate. Regular rhythm. No murmurs. No rubs. No gallops. Normal S1, S2.  Respiratory: Normal respiratory effort. Clear to auscultation bilaterally. No rales. No rhonchi. No wheezing.  Musculoskeletal: No  obvious deformity.  Extremities: No lower extremity edema.  Neurological: Alert & oriented x3. No slurred speech. Normal gait.  Psychiatric: Normal mood. Normal affect. " Good insight. Good judgment.  Skin: Warm. Dry. No rash.           Clock:              Health Maintenance:  Health Maintenance         Date Due Completion Date    Shingles Vaccine (1 of 2) Never done ---    RSV Vaccine (Age 60+ and Pregnant patients) (1 - Risk 60-74 years 1-dose series) Never done ---    Foot Exam 12/22/2023 12/22/2022    Diabetes Urine Screening 12/27/2023 12/27/2022    Colorectal Cancer Screening 02/26/2024 2/26/2019    COVID-19 Vaccine (5 - 2024-25 season) 09/01/2024 2/4/2022    Diabetic Eye Exam 11/03/2024 11/3/2023    Hemoglobin A1c 06/27/2025 12/27/2024    Override on 11/11/2014: Done (future)    Lipid Panel 12/27/2025 12/27/2024    High Dose Statin 02/11/2026 2/11/2025    TETANUS VACCINE 08/24/2028 8/24/2018            Health maintenance reviewed and Advised patient on the importance of completing overdue health maintenance items      Follow Up:  Follow up if symptoms worsen or fail to improve.    History     Past Medical History:  Past Medical History:   Diagnosis Date    Anticoagulant long-term use     Eliquis    Colonic polyp 07/25/2017    Coronary artery disease     CPAP (continuous positive airway pressure) dependence     Diabetes mellitus type II     Diabetes with neurologic complications     ED (erectile dysfunction)     Encounter for Medicare annual wellness exam 2/11/2025    GERD (gastroesophageal reflux disease)     Hyperlipidemia     Hypertension     Kidney stones     Kidney stones     Morbidly obese     Paroxysmal A-fib     Renal manifestation of secondary diabetes mellitus     Sleep apnea        Past Surgical History:  Past Surgical History:   Procedure Laterality Date    CATARACT EXTRACTION W/  INTRAOCULAR LENS IMPLANT Left 08/16/2016    Dr. Martinez    CATARACT EXTRACTION W/  INTRAOCULAR LENS IMPLANT Right 08/30/2016    Dr. Martinez    CORONARY STENT PLACEMENT      JOINT REPLACEMENT      both knees    SPINE SURGERY      discectomy    TONSILLECTOMY         Social  History:  Social History     Socioeconomic History    Marital status:      Spouse name: kolton    Number of children: 4    Highest education level: Bachelor's degree (e.g., BA, AB, BS)   Occupational History     Employer: [employer]   Tobacco Use    Smoking status: Never    Smokeless tobacco: Never   Substance and Sexual Activity    Alcohol use: No    Drug use: No    Sexual activity: Yes     Partners: Female     Social Drivers of Health     Financial Resource Strain: Low Risk  (2/11/2025)    Overall Financial Resource Strain (CARDIA)     Difficulty of Paying Living Expenses: Not hard at all   Food Insecurity: No Food Insecurity (2/11/2025)    Hunger Vital Sign     Worried About Running Out of Food in the Last Year: Never true     Ran Out of Food in the Last Year: Never true   Transportation Needs: No Transportation Needs (2/11/2025)    PRAPARE - Transportation     Lack of Transportation (Medical): No     Lack of Transportation (Non-Medical): No   Physical Activity: Inactive (2/11/2025)    Exercise Vital Sign     Days of Exercise per Week: 0 days     Minutes of Exercise per Session: 0 min   Stress: No Stress Concern Present (2/11/2025)    [employer] - Occupational Stress Questionnaire     Feeling of Stress : Not at all   Housing Stability: Unknown (2/11/2025)    Housing Stability Vital Sign     Unable to Pay for Housing in the Last Year: No     Homeless in the Last Year: No   Recent Concern: Housing Stability - High Risk (12/18/2024)    Received from [facility]    Housing Stability Vital Sign     Unable to Pay for Housing in the Last Year: No     Number of Times Moved in the Last Year: 2     Homeless in the Last Year: No       Family History:  Family History   Problem Relation Name Age of Onset    Dementia Mother      Cataracts Mother      Heart disease Father      No Known Problems Sister      Alcohol abuse Brother      No Known Problems Maternal Aunt      No Known Problems  Maternal Uncle      No Known Problems Paternal Aunt      No Known Problems Paternal Uncle      Colon cancer Maternal Grandmother      No Known Problems Maternal Grandfather      No Known Problems Paternal Grandfather      Drug abuse Daughter      No Known Problems Daughter      No Known Problems Son      No Known Problems Son      Amblyopia Neg Hx      Blindness Neg Hx      Cancer Neg Hx      Diabetes Neg Hx      Glaucoma Neg Hx      Hypertension Neg Hx      Macular degeneration Neg Hx      Retinal detachment Neg Hx      Strabismus Neg Hx      Stroke Neg Hx      Thyroid disease Neg Hx      Esophageal cancer Neg Hx         Allergies and Medications: (updated and reviewed)  Review of patient's allergies indicates:   Allergen Reactions    Penicillins Anaphylaxis     Current Outpatient Medications   Medication Sig Dispense Refill    amiodarone (PACERONE) 100 MG Tab Take 1 tablet (100 mg total) by mouth once daily. 90 tablet 3    aspirin (ECOTRIN) 81 MG EC tablet Take 1 tablet by mouth once daily.      atorvastatin (LIPITOR) 40 MG tablet Take 1 tablet (40 mg total) by mouth once daily. 90 tablet 3    blood-glucose meter kit To check BG 2 times daily, to use with insurance preferred meter 1 each 0    clopidogreL (PLAVIX) 75 mg tablet TAKE 1 TABLET(75 MG) BY MOUTH DAILY 90 tablet 1    ELIQUIS 5 mg Tab Take 5 mg by mouth 2 (two) times daily.      famotidine (PEPCID) 40 MG tablet Take 1 tablet (40 mg total) by mouth 2 (two) times daily. 180 tablet 3    furosemide (LASIX) 20 MG tablet Take 1 tablet (20 mg total) by mouth daily as needed (shortness of breath). 14 tablet 0    glimepiride (AMARYL) 1 MG tablet Take 1-2 tablets (1-2 mg total) by mouth before breakfast. 180 tablet 3    lancets (SAFETY LANCETS) 21 gauge Misc 1 lancet by Misc.(Non-Drug; Combo Route) route 2 (two) times daily. 100 each 11    lancets [facility] To check BG 2 times daily, to use with insurance preferred meter 100 each 0    LIDOcaine (LIDODERM) 5 % Place 1  patch onto the skin once daily. Remove & Discard patch within 12 hours or as directed by MD Amaro patch 0    lisinopriL (PRINIVIL,ZESTRIL) 5 MG tablet Take 1 tablet (5 mg total) by mouth once daily. 90 tablet 3    metoprolol succinate (TOPROL-XL) 25 MG 24 hr tablet Take 1 tablet by mouth once daily.      nitroGLYCERIN (NITROSTAT) 0.4 MG SL tablet Place 0.4 mg under the tongue 4 (four) times daily as needed.      predniSONE (DELTASONE) 10 MG tablet Take 4 tablets (40 mg total) by mouth once daily for 3 days, THEN 3 tablets (30 mg total) once daily for 3 days, THEN 2 tablets (20 mg total) once daily for 3 days, THEN 1 tablet (10 mg total) once daily for 3 days. 30 tablet 0    sodium,potassium,mag sulfates (SUPREP BOWEL PREP KIT) 17.5-3.13-1.6 gram SolR MIX AND DRINK AS DIRECTED      acetaminophen 325 mg Cap Take 325 mg by mouth. (Patient not taking: Reported on 2/11/2025)      acetaminophen with codeine (ACETAMINOPHEN-CODEINE) 120mg 12mg 5mL Soln Take 5 mLs by mouth nightly as needed. (Patient not taking: Reported on 2/11/2025) 118 mL 0     No current facility-administered medications for this visit.       This note was generated with the assistance of ambient listening technology. Verbal consent was obtained by the patient and accompanying visitor(s) for the recording of patient appointment to facilitate this note. I attest to having reviewed and edited the generated note for accuracy, though some syntax or spelling errors may persist. Please contact the author of this note for any clarification.        - The patient is given an After Visit Summary that lists all medications with directions, allergies, education, orders placed during this encounter and follow-up instructions.      - I have reviewed the patient's medical information including past medical, family, and social history sections including the medications and allergies.      - We discussed the patient's current medications.     This note was created by  combination of typed  and MModal dictation.  Transcription errors may be present.  If there are any questions, please contact me.     Ayah Estrella PA-C

## 2025-02-13 ENCOUNTER — PATIENT MESSAGE (OUTPATIENT)
Dept: REHABILITATION | Facility: OTHER | Age: 75
End: 2025-02-13
Payer: MEDICARE

## 2025-02-20 ENCOUNTER — DOCUMENTATION ONLY (OUTPATIENT)
Dept: REHABILITATION | Facility: OTHER | Age: 75
End: 2025-02-20
Payer: MEDICARE

## 2025-02-20 NOTE — PROGRESS NOTES
Wale Calvo no-showed for pelvic floor physical therapy initial evaluation appointment on 2/20/2025.    Bibi Emery, PT, DPT

## 2025-02-25 ENCOUNTER — TELEPHONE (OUTPATIENT)
Facility: CLINIC | Age: 75
End: 2025-02-25
Payer: MEDICARE

## 2025-02-25 DIAGNOSIS — J15.7 PNEUMONIA OF BOTH LUNGS DUE TO MYCOPLASMA PNEUMONIAE, UNSPECIFIED PART OF LUNG: Primary | ICD-10-CM

## 2025-02-25 RX ORDER — AZITHROMYCIN 250 MG/1
TABLET, FILM COATED ORAL
Qty: 6 TABLET | Refills: 0 | Status: SHIPPED | OUTPATIENT
Start: 2025-02-25 | End: 2025-03-02

## 2025-02-25 RX ORDER — METHYLPREDNISOLONE 4 MG/1
TABLET ORAL
Qty: 21 EACH | Refills: 0 | Status: SHIPPED | OUTPATIENT
Start: 2025-02-25 | End: 2025-03-17 | Stop reason: SDUPTHER

## 2025-03-17 ENCOUNTER — TELEPHONE (OUTPATIENT)
Dept: ADMINISTRATIVE | Facility: OTHER | Age: 75
End: 2025-03-17
Payer: MEDICARE

## 2025-03-17 DIAGNOSIS — B96.89 ACUTE BACTERIAL SINUSITIS: Primary | ICD-10-CM

## 2025-03-17 DIAGNOSIS — J15.7 PNEUMONIA OF BOTH LUNGS DUE TO MYCOPLASMA PNEUMONIAE, UNSPECIFIED PART OF LUNG: ICD-10-CM

## 2025-03-17 DIAGNOSIS — J01.90 ACUTE BACTERIAL SINUSITIS: Primary | ICD-10-CM

## 2025-03-17 RX ORDER — AZITHROMYCIN 250 MG/1
250 TABLET, FILM COATED ORAL DAILY
Qty: 5 TABLET | Refills: 0 | Status: SHIPPED | OUTPATIENT
Start: 2025-03-17

## 2025-03-17 RX ORDER — METHYLPREDNISOLONE 4 MG/1
TABLET ORAL
Qty: 21 EACH | Refills: 0 | Status: SHIPPED | OUTPATIENT
Start: 2025-03-17 | End: 2025-04-07

## 2025-03-17 NOTE — TELEPHONE ENCOUNTER
Copied from CRM #[id number]. Topic: Medications - New Medication Request  >> Mar 17, 2025 11:03 AM Melisa wrote:  .Type: RX Refill Request    Who Called: Self     Have you contacted your pharmacy:no     Refill or New Rx:new     RX Name and Strength: cortizone     How is the patient currently taking it? (ex. 1XDay):no     Is this a 30 day or 90 day RX:na     Preferred Pharmacy with phone number:.  [facility] #[id number] - [address] AT [address] [address]  Phone: [phone] Fax: [phone]       Local or Mail Order:local     Ordering Provider: Dr. Wolf     Would the patient rather a call back or a response via [facility]? Call     Best Call Back Number: .[phone]

## 2025-03-17 NOTE — TELEPHONE ENCOUNTER
----- Message from Med Assistant Laura sent at 3/17/2025  2:19 PM CDT -----  Contact: [phone]  Pt states he was out of town and got sick and instead of coming in he states this what works for himZpak and yojana injection prescriptions  ----- Message -----  From: Ignacia Mae  Sent: 3/17/2025   2:11 PM CDT  To: Luciano Corley Staff    Type: Returning a callWho left a message?Valentín Mendiola MAWhen did the practice call?TODAY Does patient know what this is regarding:Would the patient rather a call back or a response via [facility]? Call back Please call pt and advise

## 2025-04-03 ENCOUNTER — NURSE TRIAGE (OUTPATIENT)
Dept: ADMINISTRATIVE | Facility: CLINIC | Age: 75
End: 2025-04-03
Payer: MEDICARE

## 2025-04-03 ENCOUNTER — OFFICE VISIT (OUTPATIENT)
Dept: FAMILY MEDICINE | Facility: CLINIC | Age: 75
End: 2025-04-03
Payer: MEDICARE

## 2025-04-03 VITALS
TEMPERATURE: 98 F | WEIGHT: 315 LBS | RESPIRATION RATE: 18 BRPM | SYSTOLIC BLOOD PRESSURE: 128 MMHG | HEART RATE: 70 BPM | BODY MASS INDEX: 41.75 KG/M2 | HEIGHT: 73 IN | DIASTOLIC BLOOD PRESSURE: 80 MMHG | OXYGEN SATURATION: 97 %

## 2025-04-03 DIAGNOSIS — M72.2 PLANTAR FASCIITIS, BILATERAL: Primary | ICD-10-CM

## 2025-04-03 PROCEDURE — 99215 OFFICE O/P EST HI 40 MIN: CPT | Mod: PBBFAC,PO

## 2025-04-03 PROCEDURE — 96372 THER/PROPH/DIAG INJ SC/IM: CPT | Mod: PBBFAC,PO

## 2025-04-03 PROCEDURE — 99999 PR PBB SHADOW E&M-EST. PATIENT-LVL V: CPT | Mod: PBBFAC,,,

## 2025-04-03 PROCEDURE — 99999PBSHW PR PBB SHADOW TECHNICAL ONLY FILED TO HB: Mod: JZ,PBBFAC,,

## 2025-04-03 RX ORDER — KETOROLAC TROMETHAMINE 30 MG/ML
15 INJECTION, SOLUTION INTRAMUSCULAR; INTRAVENOUS
Status: COMPLETED | OUTPATIENT
Start: 2025-04-03 | End: 2025-04-03

## 2025-04-03 RX ADMIN — KETOROLAC TROMETHAMINE 15 MG: 30 INJECTION, SOLUTION INTRAMUSCULAR; INTRAVENOUS at 12:04

## 2025-04-03 NOTE — TELEPHONE ENCOUNTER
Pt co left foot bottom of back of heel pain onset 4 days ago able to walk pain is worse when he lays down at night starts throbbing states while walking experiences shooting pain going up achilles tendon.per protocol instructed pt to be seen today in office. Offered VV pt declined apt made with family medicine.    Reason for Disposition   Swollen foot  (Exceptions: Localized bump from bunions, calluses, insect bite, sting.)    Additional Information   Negative: Entire foot is cool or blue in comparison to other foot   Negative: Purple or black skin on foot or toe   Negative: Red area or streak and fever   Negative: Swollen foot and fever   Negative: Patient sounds very sick or weak to the triager   Negative: SEVERE pain (e.g., excruciating, unable to do any normal activities)   Negative: Looks like a boil, infected sore, deep ulcer, or other infected rash (spreading redness, pus)    Protocols used: Foot Pain-A-[place]

## 2025-04-03 NOTE — PROGRESS NOTES
Administered Ketorolac 15 mg IM to left ventrogluteal.  Patient tolerated injection well, no adverse reactions noted.

## 2025-04-03 NOTE — PROGRESS NOTES
Health Maintenance Due   Topic Date Due    Shingles Vaccine (1 of 2) Consult PCP     RSV Vaccine (Age 60+ and Pregnant patients) (1 - Risk 60-74 years 1-dose series) Not offered at this office    Foot Exam  Consult PCP     Diabetes Urine Screening  Consult PCP     Colorectal Cancer Screening  Consult PCP     COVID-19 Vaccine (5 - 2024-25 season) Consult PCP     Diabetic Eye Exam  Consult PCP

## 2025-04-03 NOTE — PROGRESS NOTES
Family Medicine     Patient name: Wale Calvo  MRN: [id number]  : 1950  PCP NAME: Jory Wolf MD    Active Problem List with Overview Notes    Diagnosis Date Noted    Encounter for Medicare annual wellness exam 2025    Advanced directives, counseling/discussion 2025    SOB (shortness of breath) 2025    Chest pain 2024    Coronary artery disease 2024    Chest pain 2024    Type 2 diabetes mellitus with stage 3b chronic kidney disease, without long-term current use of insulin 2024    Secondary hyperparathyroidism 2024    Thrombocytopenia, unspecified 2023    Atherosclerosis of native coronary artery of native heart with angina pectoris     Morbid (severe) obesity due to excess calories 2022    ACS (acute coronary syndrome) 2021    Paroxysmal atrial fibrillation 2021    CKD (chronic kidney disease) stage 3, GFR 30-59 ml/min 2021    Diverticulitis of colon 2019    Hemorrhoids 2019    Seasonal allergies 2019    Dry eye syndrome of both eyes 2019    Aortic atherosclerosis 2019    Vitreous detachment of left eye 2018    PCO (posterior capsular opacification), bilateral 2018    Pseudophakia 2018    Obstructive sleep apnea syndrome 2017    Lumbar transverse process fracture 2016    Nuclear sclerosis of right eye 2016    Senile nuclear sclerosis 2016    Refractive error 2016    Nuclear sclerosis of both eyes 2016    DM type 2 without retinopathy 2016    Essential hypertension 10/23/2013    Coronary artery disease involving native coronary artery of native heart without angina pectoris 10/23/2013    Hyperlipidemia, acquired 10/23/2013    Morbid obesity with BMI of 45.0-49.9, adult 10/23/2013       History of Present Illness    Patient presents today with heel pain.    He presents with left heel pain localized to the back of the heel,  bottom of the foot pad, and extending up the Achilles tendon. The pain is described as throbbing and shooting, present for about four days without resolution. He experienced similar pain in both feet previously that only lasted one day. The current episode began with bilateral foot pain for one day, which resolved but then recurred in the left foot. Pain worsens with prolonged bed rest. The foot is significantly swollen, particularly in the morning. Both heat and cold applications exacerbated the pain. Over-the-counter medications including Advil and Tylenol have been ineffective. He took his wife's leftover Percocet the night before the visit for pain relief.    He has stage 3 kidney disease with GFR of 39 and atrial fibrillation which is currently not well controlled. He was recently found to have an overactive thyroid discovered during pre-ablation workup for atrial fibrillation. Thyroid ultrasound showed suspicious nodules requiring biopsy.    He primarily wears Crocs for footwear but has recently switched to tennis shoes.      ROS:  General: -fever, -chills, -fatigue, -weight gain, -weight loss  Eyes: -vision changes, -redness, -discharge  ENT: -ear pain, -nasal congestion, -sore throat  Cardiovascular: -chest pain, -palpitations, +lower extremity edema  Respiratory: -cough, -shortness of breath  Gastrointestinal: -abdominal pain, -nausea, -vomiting, -diarrhea, -constipation, -blood in stool  Genitourinary: -dysuria, -hematuria, -frequency  Musculoskeletal: -joint pain, -muscle pain, +shooting pain sensation, +limb pain, +limb swelling  Skin: -rash, -lesion  Neurological: -headache, -dizziness, -numbness, -tingling  Psychiatric: -anxiety, -depression, -sleep difficulty          Past Medical History:   Diagnosis Date    Anticoagulant long-term use     Eliquis    Colonic polyp 07/25/2017    Coronary artery disease     CPAP (continuous positive airway pressure) dependence     Diabetes mellitus type II     Diabetes  with neurologic complications     ED (erectile dysfunction)     Encounter for Medicare annual wellness exam 2/11/2025    GERD (gastroesophageal reflux disease)     Hyperlipidemia     Hypertension     Kidney stones     Kidney stones     Morbidly obese     Paroxysmal A-fib     Renal manifestation of secondary diabetes mellitus     Sleep apnea        Past Surgical History:   Procedure Laterality Date    CATARACT EXTRACTION W/  INTRAOCULAR LENS IMPLANT Left 08/16/2016    Dr. Martinez    CATARACT EXTRACTION W/  INTRAOCULAR LENS IMPLANT Right 08/30/2016    Dr. Martinez    CORONARY STENT PLACEMENT      JOINT REPLACEMENT      both knees    SPINE SURGERY      discectomy    TONSILLECTOMY          Family History   Problem Relation Name Age of Onset    Dementia Mother      Cataracts Mother      Heart disease Father      No Known Problems Sister      Alcohol abuse Brother      No Known Problems Maternal Aunt      No Known Problems Maternal Uncle      No Known Problems Paternal Aunt      No Known Problems Paternal Uncle      Colon cancer Maternal Grandmother      No Known Problems Maternal Grandfather      No Known Problems Paternal Grandfather      Drug abuse Daughter      No Known Problems Daughter      No Known Problems Son      No Known Problems Son      Amblyopia Neg Hx      Blindness Neg Hx      Cancer Neg Hx      Diabetes Neg Hx      Glaucoma Neg Hx      Hypertension Neg Hx      Macular degeneration Neg Hx      Retinal detachment Neg Hx      Strabismus Neg Hx      Stroke Neg Hx      Thyroid disease Neg Hx      Esophageal cancer Neg Hx          Social History     Socioeconomic History    Marital status:      Spouse name: kolton    Number of children: 4    Highest education level: Bachelor's degree (e.g., BA, AB, BS)   Occupational History     Employer: [employer]   Tobacco Use    Smoking status: Never    Smokeless tobacco: Never   Substance and Sexual Activity    Alcohol use: No    Drug use: No    Sexual  "activity: Yes     Partners: Female     Social Drivers of Health     Financial Resource Strain: Low Risk  (2/11/2025)    Overall Financial Resource Strain (CARDIA)     Difficulty of Paying Living Expenses: Not hard at all   Food Insecurity: No Food Insecurity (2/11/2025)    Hunger Vital Sign     Worried About Running Out of Food in the Last Year: Never true     Ran Out of Food in the Last Year: Never true   Transportation Needs: No Transportation Needs (2/11/2025)    PRAPARE - Transportation     Lack of Transportation (Medical): No     Lack of Transportation (Non-Medical): No   Physical Activity: Inactive (2/11/2025)    Exercise Vital Sign     Days of Exercise per Week: 0 days     Minutes of Exercise per Session: 0 min   Stress: No Stress Concern Present (2/11/2025)    [employer] - Occupational Stress Questionnaire     Feeling of Stress : Not at all   Housing Stability: Unknown (2/11/2025)    Housing Stability Vital Sign     Unable to Pay for Housing in the Last Year: No     Homeless in the Last Year: No   Recent Concern: Housing Stability - High Risk (12/18/2024)    Received from [facility]    Housing Stability Vital Sign     Unable to Pay for Housing in the Last Year: No     Number of Times Moved in the Last Year: 2     Homeless in the Last Year: No       /80   Pulse 70   Temp 97.7 °F (36.5 °C)   Resp 18   Ht 6' 1" (1.854 m)   Wt (!) 155.2 kg (342 lb 2.5 oz)   SpO2 97%   BMI 45.14 kg/m²     Physical Exam    General: No acute distress. Well-developed. Well-nourished.  Eyes: EOMI. Sclerae anicteric.  HENT: Normocephalic. Atraumatic. Nares patent. Moist oral mucosa.  Ears: Bilateral TMs clear. Bilateral EACs clear.  Cardiovascular: Regular rate. Regular rhythm. No murmurs. No rubs. No gallops. Normal S1, S2.  Respiratory: Normal respiratory effort. Clear to auscultation bilaterally. No rales. No rhonchi. No wheezing.  Abdomen: Soft. Non-tender. Non-distended. Normoactive " bowel sounds.  Musculoskeletal: No  obvious deformity. Painful on palpation of the heel and Achilles. Pain on plantar flexion.  Extremities: No lower extremity edema.  Neurological: Alert & oriented x3. No slurred speech. Normal gait.  Psychiatric: Normal mood. Normal affect. Good insight. Good judgment.  Skin: Warm. Dry. No rash.            Assessment & Plan         Wale was seen today for foot injury.    Diagnoses and all orders for this visit:    Plantar fasciitis, bilateral  Assessed the patient's condition, noting pain in the back of the heel, bottom of the pad, and up the Achilles tendon.  Performed physical exam, eliciting pain when pressing on the back of the heel and bottom of the pad, and when the foot is pushed down.  Symptoms sound highly suggestive of plantar fasciitis.  Counseled against wearing Crocs which may lack arch support.  Recommend quality insoles.  Trial of calf stretches and cold rolling.  Has upcoming appointment with Podiatry for further management.  May need custom orthotics  Chronic NSAID use contraindicated due to kidney problems  -     ketorolac injection 15 mg  -     Ambulatory Referral/Consult to Physical Therapy; Future         Problem List Items Addressed This Visit    None  Visit Diagnoses         Plantar fasciitis, bilateral    -  Primary    Relevant Medications    ketorolac injection 15 mg (Completed)    Other Relevant Orders    Ambulatory Referral/Consult to Physical Therapy              Medication List with Changes/Refills   Current Medications    ACETAMINOPHEN 325 MG CAP    Take 325 mg by mouth.    ACETAMINOPHEN WITH CODEINE (ACETAMINOPHEN-CODEINE) 120MG 12MG 5ML SOLN    Take 5 mLs by mouth nightly as needed.    AMIODARONE (PACERONE) 100 MG TAB    Take 1 tablet (100 mg total) by mouth once daily.    ASPIRIN (ECOTRIN) 81 MG EC TABLET    Take 1 tablet by mouth once daily.    ATORVASTATIN (LIPITOR) 40 MG TABLET    Take 1 tablet (40 mg total) by mouth once daily.     AZITHROMYCIN (ZITHROMAX Z-CRISTO) 250 MG TABLET    Take 1 tablet (250 mg total) by mouth once daily. Take 2 tabs on day 1 then 1 tab/day for 4 days    BLOOD-GLUCOSE METER KIT    To check BG 2 times daily, to use with insurance preferred meter    CLOPIDOGREL (PLAVIX) 75 MG TABLET    TAKE 1 TABLET(75 MG) BY MOUTH DAILY    ELIQUIS 5 MG TAB    Take 5 mg by mouth 2 (two) times daily.    FAMOTIDINE (PEPCID) 40 MG TABLET    Take 1 tablet (40 mg total) by mouth 2 (two) times daily.    FUROSEMIDE (LASIX) 20 MG TABLET    Take 1 tablet (20 mg total) by mouth daily as needed (shortness of breath).    GLIMEPIRIDE (AMARYL) 1 MG TABLET    Take 1-2 tablets (1-2 mg total) by mouth before breakfast.    LANCETS (SAFETY LANCETS) 21 GAUGE MISC    1 lancet by Misc.(Non-Drug; Combo Route) route 2 (two) times daily.    LANCETS MISC    To check BG 2 times daily, to use with insurance preferred meter    LIDOCAINE (LIDODERM) 5 %    Place 1 patch onto the skin once daily. Remove & Discard patch within 12 hours or as directed by MD    LISINOPRIL (PRINIVIL,ZESTRIL) 5 MG TABLET    Take 1 tablet (5 mg total) by mouth once daily.    METHYLPREDNISOLONE (MEDROL DOSEPACK) 4 MG TABLET    use as directed    METOPROLOL SUCCINATE (TOPROL-XL) 25 MG 24 HR TABLET    Take 1 tablet by mouth once daily.    NITROGLYCERIN (NITROSTAT) 0.4 MG SL TABLET    Place 0.4 mg under the tongue 4 (four) times daily as needed.    SODIUM,POTASSIUM,MAG SULFATES (SUPREP BOWEL PREP KIT) 17.5-3.13-1.6 GRAM SOLR    MIX AND DRINK AS DIRECTED         No follow-ups on file.    Dre Calix MD        This note was generated with the assistance of ambient listening technology. Verbal consent was obtained by the patient and accompanying visitor(s) for the recording of patient appointment to facilitate this note. I attest to having reviewed and edited the generated note for accuracy, though some syntax or spelling errors may persist. Please contact the author of this note for any  clarification.

## 2025-04-04 ENCOUNTER — LAB VISIT (OUTPATIENT)
Dept: LAB | Facility: HOSPITAL | Age: 75
End: 2025-04-04
Attending: STUDENT IN AN ORGANIZED HEALTH CARE EDUCATION/TRAINING PROGRAM
Payer: MEDICARE

## 2025-04-04 ENCOUNTER — OFFICE VISIT (OUTPATIENT)
Facility: CLINIC | Age: 75
End: 2025-04-04
Payer: MEDICARE

## 2025-04-04 VITALS
HEART RATE: 80 BPM | BODY MASS INDEX: 41.75 KG/M2 | SYSTOLIC BLOOD PRESSURE: 122 MMHG | RESPIRATION RATE: 18 BRPM | DIASTOLIC BLOOD PRESSURE: 68 MMHG | OXYGEN SATURATION: 97 % | WEIGHT: 315 LBS | HEIGHT: 73 IN | TEMPERATURE: 97 F

## 2025-04-04 DIAGNOSIS — Z12.5 PROSTATE CANCER SCREENING: ICD-10-CM

## 2025-04-04 DIAGNOSIS — E11.22 TYPE 2 DIABETES MELLITUS WITH STAGE 3B CHRONIC KIDNEY DISEASE, WITHOUT LONG-TERM CURRENT USE OF INSULIN: ICD-10-CM

## 2025-04-04 DIAGNOSIS — I10 ESSENTIAL HYPERTENSION: Primary | ICD-10-CM

## 2025-04-04 DIAGNOSIS — N18.32 TYPE 2 DIABETES MELLITUS WITH STAGE 3B CHRONIC KIDNEY DISEASE, WITHOUT LONG-TERM CURRENT USE OF INSULIN: ICD-10-CM

## 2025-04-04 PROCEDURE — 84153 ASSAY OF PSA TOTAL: CPT

## 2025-04-04 PROCEDURE — 36415 COLL VENOUS BLD VENIPUNCTURE: CPT | Mod: PN

## 2025-04-04 PROCEDURE — 99215 OFFICE O/P EST HI 40 MIN: CPT | Mod: PBBFAC,PN | Performed by: STUDENT IN AN ORGANIZED HEALTH CARE EDUCATION/TRAINING PROGRAM

## 2025-04-04 PROCEDURE — 99999 PR PBB SHADOW E&M-EST. PATIENT-LVL V: CPT | Mod: PBBFAC,,, | Performed by: STUDENT IN AN ORGANIZED HEALTH CARE EDUCATION/TRAINING PROGRAM

## 2025-04-04 PROCEDURE — 83036 HEMOGLOBIN GLYCOSYLATED A1C: CPT

## 2025-04-04 RX ORDER — ROSUVASTATIN CALCIUM 5 MG/1
5 TABLET, COATED ORAL DAILY
COMMUNITY

## 2025-04-04 NOTE — PROGRESS NOTES
SUBJECTIVE     Chief Complaint   Patient presents with    Follow-up       History of Present Illness    CHIEF COMPLAINT:  Patient presents today for foot pain    MUSCULOSKELETAL:  He reports severe bilateral posterior thigh pain, particularly notable in the mornings. He requires 15-20 minutes of hot shower therapy with hamstring stretches to achieve mobility. He has been self-managing with Voltaren application to posterior thighs and performing hamstring stretches and squats. His podiatrist noted tight calves.    RESPIRATORY:  He experiences persistent shortness of breath, particularly after minimal exertion such as walking dogs across the street. He reports feeling out of breath all the time with associated chest tightness across the upper chest. He develops upper respiratory infections after exposure to large crowds at [employer], which respond well to Z-Javier and cortisone.    ENDOCRINE:  He has a hyperactive thyroid with nodules identified on thyroid scan, pending biopsy. His typical glucose readings are around 175 mg/dL, with a recent spike to 225 mg/dL after excessive sugar consumption, which prompted reduction in sugar intake.    MEDICATIONS:  He takes Eliquis and Clopidogrel 5 mg.      ROS:  General: -fever, -chills, -fatigue, -weight gain, -weight loss  Eyes: -vision changes, -redness, -discharge  ENT: -ear pain, -nasal congestion, -sore throat  Cardiovascular: -chest pain, -palpitations, -lower extremity edema, +chest tightness  Respiratory: -cough, +shortness of breath, +exertional dyspnea  Gastrointestinal: -abdominal pain, -nausea, -vomiting, -diarrhea, -constipation, -blood in stool  Genitourinary: -dysuria, -hematuria, -frequency  Musculoskeletal: -joint pain, -muscle pain, +limb pain, +muscle tension  Skin: -rash, -lesion  Neurological: -headache, -dizziness, -numbness, -tingling, +memory loss  Psychiatric: -anxiety, -depression, -sleep difficulty  Endocrine: +neck lumps           PAST MEDICAL  "HISTORY:  Past Medical History:   Diagnosis Date    Anticoagulant long-term use     Eliquis    Colonic polyp 07/25/2017    Coronary artery disease     CPAP (continuous positive airway pressure) dependence     Diabetes mellitus type II     Diabetes with neurologic complications     ED (erectile dysfunction)     Encounter for Medicare annual wellness exam 2/11/2025    GERD (gastroesophageal reflux disease)     Hyperlipidemia     Hypertension     Kidney stones     Kidney stones     Morbidly obese     Paroxysmal A-fib     Renal manifestation of secondary diabetes mellitus     Sleep apnea        ALLERGIES AND MEDICATIONS: updated and reviewed.  Review of patient's allergies indicates:   Allergen Reactions    Penicillins Anaphylaxis     Current Medications[1]      OBJECTIVE     Physical Exam  Vitals:    04/04/25 1354   BP: 122/68   Pulse: 80   Resp: 18   Temp: 97.1 °F (36.2 °C)    Body mass index is 45.64 kg/m².  Weight: (!) 156.9 kg (345 lb 14.4 oz)   Height: 6' 1" (185.4 cm)     Physical Exam  Vitals reviewed.   Constitutional:       General: He is not in acute distress.  HENT:      Right Ear: External ear normal.      Left Ear: External ear normal.      Nose: Nose normal.      Mouth/Throat:      Mouth: Mucous membranes are moist.   Eyes:      Extraocular Movements: Extraocular movements intact.      Conjunctiva/sclera: Conjunctivae normal.      Pupils: Pupils are equal, round, and reactive to light.   Pulmonary:      Effort: Pulmonary effort is normal.   Abdominal:      General: There is no distension.      Palpations: Abdomen is soft.   Musculoskeletal:         General: No swelling. Normal range of motion.      Cervical back: Normal range of motion.   Skin:     General: Skin is warm and dry.      Findings: No rash.   Neurological:      General: No focal deficit present.      Mental Status: He is alert and oriented to person, place, and time.   Psychiatric:         Mood and Affect: Mood normal.         Behavior: " Behavior normal.           Health Maintenance         Date Due Completion Date    Shingles Vaccine (1 of 2) Never done ---    RSV Vaccine (Age 60+ and Pregnant patients) (1 - Risk 60-74 years 1-dose series) Never done ---    Foot Exam 12/22/2023 12/22/2022    Diabetes Urine Screening 12/27/2023 12/27/2022    COVID-19 Vaccine (5 - 2024-25 season) 09/01/2024 2/4/2022    Diabetic Eye Exam 11/03/2024 11/3/2023    Hemoglobin A1c 06/27/2025 12/27/2024    Override on 11/11/2014: Done (future)    Lipid Panel 12/27/2025 12/27/2024    High Dose Statin 04/04/2026 4/4/2025    TETANUS VACCINE 08/24/2028 8/24/2018    Colorectal Cancer Screening 03/03/2030 3/3/2025          Assessment & Plan    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  Assessed complaints of shortness of breath and chest tightness.  Evaluated glucose control, noting recent high readings.    THYROID NODULES:  - Detected nodules on the patient's thyroid via scan.  - Acknowledged the thyroid issue and planned to discuss it at the upcoming visit on 1/17.  - Referred the patient to Dr. Fernandes at [facility] for thyroid biopsy.    IMPAIRED GLUCOSE TOLERANCE:  - Ordered A1C test.  - Patient reported high blood sugar, with recent readings of 175 and 225.  - Acknowledged high blood sugar and planned to perform A1C test.  - Patient reported cutting out sugar after high reading.  - Planned to perform A1C test.    SHORTNESS OF BREATH:  - Patient reported being dyspneic all the time,  especially after walking the dogs.  - Patient experiences tightness across the top of chest with dyspnea.    UPPER RESPIRATORY INFECTION:  - Patient reported kelly a cold after attending wrestling meets with large crowds.  - Treated the patient with azithromycin and corticosteroids.    ANTICOAGULANT USE:  - Patient is currently on apixaban (anticoagulant).  - Advised to discontinue apixaban 5 days before the procedure.    ANTIPLATELET USE:  - Patient is currently on clopidogrel (antiplatelet).  - Advised to discontinue clopidogrel 5 days before the procedure.    FOLLOW-UP:  - Scheduled follow up in 6 months.  - Follow up in 6 months.  - Ordered PSA test.         Jory Wolf MD  04/04/2025 1:52 PM    This note was generated with the assistance of ambient listening technology. Verbal consent was obtained by the patient and accompanying visitor(s) for the recording of patient appointment to facilitate this note. I attest to having reviewed and edited the generated note for accuracy, though some syntax or spelling errors may persist. Please contact the author of this note for any clarification.                     [1]   Current Outpatient Medications   Medication Sig Dispense Refill    acetaminophen 325 mg Cap Take 325 mg by mouth.      atorvastatin (LIPITOR) 40 MG tablet Take 1 tablet (40 mg total) by mouth once daily. 90 tablet 3    clopidogreL (PLAVIX) 75 mg tablet TAKE 1 TABLET(75 MG) BY MOUTH DAILY 90 tablet 1    ELIQUIS 5 mg Tab Take 5 mg by mouth 2 (two) times daily.      famotidine (PEPCID) 40 MG tablet Take 1 tablet (40 mg total) by mouth 2 (two) times daily. 180 tablet 3    glimepiride (AMARYL) 1 MG tablet Take 1-2 tablets (1-2 mg total) by mouth before breakfast. 180 tablet 3    lancets (SAFETY LANCETS) 21 gauge Misc 1 lancet by Misc.(Non-Drug; Combo Route) route 2 (two) times daily. 100 each 11    lancets Misc To check BG 2 times daily, to use with insurance preferred meter 100 each 0    LIDOcaine  (LIDODERM) 5 % Place 1 patch onto the skin once daily. Remove & Discard patch within 12 hours or as directed by MD 14 patch 0    lisinopriL (PRINIVIL,ZESTRIL) 5 MG tablet Take 1 tablet (5 mg total) by mouth once daily. 90 tablet 3    nitroGLYCERIN (NITROSTAT) 0.4 MG SL tablet Place 0.4 mg under the tongue 4 (four) times daily as needed.      acetaminophen with codeine (ACETAMINOPHEN-CODEINE) 120mg 12mg 5mL Soln Take 5 mLs by mouth nightly as needed. (Patient not taking: Reported on 4/4/2025) 118 mL 0    amiodarone (PACERONE) 100 MG Tab Take 1 tablet (100 mg total) by mouth once daily. (Patient not taking: Reported on 4/4/2025) 90 tablet 3    aspirin (ECOTRIN) 81 MG EC tablet Take 1 tablet by mouth once daily. (Patient not taking: Reported on 4/4/2025)      blood-glucose meter kit To check BG 2 times daily, to use with insurance preferred meter 1 each 0    furosemide (LASIX) 20 MG tablet Take 1 tablet (20 mg total) by mouth daily as needed (shortness of breath). (Patient not taking: Reported on 4/4/2025) 14 tablet 0    metoprolol succinate (TOPROL-XL) 25 MG 24 hr tablet Take 1 tablet by mouth once daily. (Patient not taking: Reported on 4/4/2025)      rosuvastatin (CRESTOR) 5 MG tablet Take 5 mg by mouth once daily.      sodium,potassium,mag sulfates (SUPREP BOWEL PREP KIT) 17.5-3.13-1.6 gram SolR MIX AND DRINK AS DIRECTED (Patient not taking: Reported on 4/4/2025)       No current facility-administered medications for this visit.

## 2025-04-05 LAB
EAG (OHS): 166 MG/DL (ref 68–131)
HBA1C MFR BLD: 7.4 % (ref 4–5.6)
PSA SERPL-MCNC: 1.02 NG/ML

## 2025-04-08 ENCOUNTER — APPOINTMENT (OUTPATIENT)
Dept: RADIOLOGY | Facility: OTHER | Age: 75
End: 2025-04-08
Payer: MEDICARE

## 2025-04-08 ENCOUNTER — OFFICE VISIT (OUTPATIENT)
Dept: PODIATRY | Facility: CLINIC | Age: 75
End: 2025-04-08
Payer: MEDICARE

## 2025-04-08 VITALS
HEIGHT: 73 IN | BODY MASS INDEX: 41.75 KG/M2 | WEIGHT: 315 LBS | SYSTOLIC BLOOD PRESSURE: 132 MMHG | HEART RATE: 77 BPM | DIASTOLIC BLOOD PRESSURE: 84 MMHG

## 2025-04-08 DIAGNOSIS — E11.49 TYPE II DIABETES MELLITUS WITH NEUROLOGICAL MANIFESTATIONS: Primary | ICD-10-CM

## 2025-04-08 DIAGNOSIS — E11.49 TYPE II DIABETES MELLITUS WITH NEUROLOGICAL MANIFESTATIONS: ICD-10-CM

## 2025-04-08 DIAGNOSIS — E11.51 TYPE II DIABETES MELLITUS WITH PERIPHERAL CIRCULATORY DISORDER: ICD-10-CM

## 2025-04-08 PROCEDURE — 29540 STRAPPING ANKLE &/FOOT: CPT | Mod: 50,PBBFAC,PN | Performed by: PODIATRIST

## 2025-04-08 PROCEDURE — 99214 OFFICE O/P EST MOD 30 MIN: CPT | Mod: PBBFAC,PN | Performed by: PODIATRIST

## 2025-04-08 PROCEDURE — 73630 X-RAY EXAM OF FOOT: CPT | Mod: TC,50,PN

## 2025-04-08 PROCEDURE — 99999 PR PBB SHADOW E&M-EST. PATIENT-LVL IV: CPT | Mod: PBBFAC,,, | Performed by: PODIATRIST

## 2025-04-08 PROCEDURE — 73630 X-RAY EXAM OF FOOT: CPT | Mod: 26,50,, | Performed by: STUDENT IN AN ORGANIZED HEALTH CARE EDUCATION/TRAINING PROGRAM

## 2025-04-08 NOTE — PROGRESS NOTES
Subjective:      Patient ID: Wale Calvo is a 74 y.o. male.    Chief Complaint: Plantar Fasciitis    Open sores in the back of the right heel in the bottom of the right big toe.  Conditions were not known to patient until visit.  Aggravating factors pressure shear force.  No prior medical treatment.  No self-treatment.  Denies known trauma and surgery of the right foot.    Cc2  sharp deep throbbing pain in the backs of the heels right and left.  Chronic condition worsening for the past several months.  Aggravated with increased weight-bearing particularly after rest.  No prior medical treatment.  Self-treatment with rolling a ball on the bottom of the foot as not helped.  Denies trauma and surgery both feet.    Cc3  Diabetes, increased risk amputation needing evaluation/management/optomization of foot care.     Chief Complaint   Patient presents with    Plantar Fasciitis        Crocs bilateral    Review of Systems   Constitutional: Negative for chills, diaphoresis, fever, malaise/fatigue and night sweats.   Cardiovascular:  Negative for claudication, cyanosis, leg swelling and syncope.   Skin:  Positive for poor wound healing. Negative for color change, dry skin, nail changes, rash, suspicious lesions and unusual hair distribution.   Musculoskeletal:  Negative for falls, joint pain, joint swelling, muscle cramps, muscle weakness and stiffness.   Gastrointestinal:  Negative for constipation, diarrhea, nausea and vomiting.   Neurological:  Positive for numbness, paresthesias and sensory change. Negative for brief paralysis, disturbances in coordination, focal weakness and tremors.           Objective:      Physical Exam  Constitutional:       General: He is not in acute distress.     Appearance: He is well-developed. He is not diaphoretic.   Cardiovascular:      Pulses:           Popliteal pulses are 2+ on the right side and 2+ on the left side.        Dorsalis pedis pulses are 2+ on the right side and 2+ on  the left side.        Posterior tibial pulses are 2+ on the right side and 2+ on the left side.      Comments: Capillary refill 3 seconds all toes/distal feet, all toes/both feet warm to touch.      Negative lymphadenopathy bilateral popliteal fossa and tarsal tunnel.      Less than 2+ pitting lower extremity edema bilateral.    Musculoskeletal:      Right ankle: No swelling, deformity, ecchymosis or lacerations. Normal range of motion. Normal pulse.      Right Achilles Tendon: Normal. No defects. Bass's test negative.      Comments: Calcaneal varus bilateral with a posterior enthesopathy.      Ankle dorsiflexion decreased at <10 degrees bilateral with no detectable increase with knee flexion bilateral.    Pain to palpation posterior heel right and left with visible and palpable hard prominence of bone.  No evidence of trauma or infection present today.         Lymphadenopathy:      Lower Body: No right inguinal adenopathy. No left inguinal adenopathy.      Comments: Negative lymphadenopathy bilateral popliteal fossa and tarsal tunnel.    Negative lymphangitic streaking bilateral feet/ankles/legs.   Skin:     General: Skin is warm and dry.      Capillary Refill: Capillary refill takes 2 to 3 seconds.      Coloration: Skin is not pale.      Findings: No abrasion, bruising, burn, ecchymosis, erythema, laceration, lesion or rash.      Nails: There is no clubbing.      Comments:   Small superficial ulceration in the posteromedial heel right in the plantar distal hallux right.  Both have pink granular bases extend to but not through the dermis, and without without pus tracking fluctuance malodor signs of infection.      Otherwise, Skin thin, shiny, atrophic, with decreased density and distribution of pedal hair bilateral, but without hyperpigmentation, lauro discoloration,  ulcers, masses, nodules or cords palpated bilateral feet and legs.     Neurological:      Mental Status: He is alert and oriented to person,  place, and time.      Sensory: Sensory deficit present.      Motor: No tremor, atrophy or abnormal muscle tone.      Gait: Gait normal.      Deep Tendon Reflexes:      Reflex Scores:       Patellar reflexes are 2+ on the right side and 2+ on the left side.       Achilles reflexes are 2+ on the right side and 2+ on the left side.     Comments: Diminished/loss of protective sensation all toes bilateral to 10 gram monofilament.     Psychiatric:         Behavior: Behavior is cooperative.               Assessment:       Encounter Diagnoses   Name Primary?    Type II diabetes mellitus with neurological manifestations Yes    Type II diabetes mellitus with peripheral circulatory disorder          Plan:       Wale was seen today for plantar fasciitis.    Diagnoses and all orders for this visit:    Type II diabetes mellitus with neurological manifestations  -     DIABETIC SHOES FOR HOME USE  -     X-Ray Foot Complete Bilateral; Future  -     X-Ray Calcaneus Bilateral; Future    Type II diabetes mellitus with peripheral circulatory disorder  -     DIABETIC SHOES FOR HOME USE  -     X-Ray Foot Complete Bilateral; Future  -     X-Ray Calcaneus Bilateral; Future      I counseled the patient on his conditions, their implications and medical management.        Patient will stretch the tendo achilles complex three times daily as demonstrated in the office.  Literature was dispensed illustrating proper stretching technique.    I applied a plantar rest strapping to the patient's right and left foot to offload symptomatic area, support the arch, and relieve pain.    Patient will obtain over the counter arch supports and wear them in shoes whenever possible.  Athletic shoes intended for walking or running are usually best.    Discussed conservative treatment with shoes of adequate dimensions, material, and style to alleviate symptoms and delay or prevent surgical intervention.    Xrays.    Keep wounds right foot covered all times with  Band-Aid, Mepilex border or similar changing daily.      One-week, sooner p.r.n.          No follow-ups on file.

## 2025-04-09 ENCOUNTER — RESULTS FOLLOW-UP (OUTPATIENT)
Facility: CLINIC | Age: 75
End: 2025-04-09

## 2025-04-16 ENCOUNTER — OFFICE VISIT (OUTPATIENT)
Dept: PODIATRY | Facility: CLINIC | Age: 75
End: 2025-04-16
Payer: MEDICARE

## 2025-04-16 VITALS
DIASTOLIC BLOOD PRESSURE: 76 MMHG | HEIGHT: 73 IN | WEIGHT: 315 LBS | HEART RATE: 66 BPM | BODY MASS INDEX: 41.75 KG/M2 | SYSTOLIC BLOOD PRESSURE: 142 MMHG

## 2025-04-16 DIAGNOSIS — E11.51 TYPE II DIABETES MELLITUS WITH PERIPHERAL CIRCULATORY DISORDER: ICD-10-CM

## 2025-04-16 DIAGNOSIS — E11.49 TYPE II DIABETES MELLITUS WITH NEUROLOGICAL MANIFESTATIONS: Primary | ICD-10-CM

## 2025-04-16 DIAGNOSIS — Z87.898 HISTORY OF ULCERATION: ICD-10-CM

## 2025-04-16 PROCEDURE — 99213 OFFICE O/P EST LOW 20 MIN: CPT | Mod: S$PBB,,, | Performed by: PODIATRIST

## 2025-04-16 PROCEDURE — 99214 OFFICE O/P EST MOD 30 MIN: CPT | Mod: PBBFAC,PN | Performed by: PODIATRIST

## 2025-04-16 PROCEDURE — 99999 PR PBB SHADOW E&M-EST. PATIENT-LVL IV: CPT | Mod: PBBFAC,,, | Performed by: PODIATRIST

## 2025-04-16 NOTE — PROGRESS NOTES
Subjective:      Patient ID: Wale Calvo is a 74 y.o. male.    Chief Complaint: Wound Check    Ulcers right hallux and heel.  Patient covering daily with Band-Aid or similar changing daily.      Notes steady improvement.  Not sure wounds are still present.      X-rays last visit show extensive osteoarthritis with hyperostosis some areas and enthesopathy without evidence for osteolysis, foreign body, gas in soft tissue, infection or abscess.    Review of Systems   Constitutional: Negative for chills, diaphoresis, fever, malaise/fatigue and night sweats.   Cardiovascular:  Positive for leg swelling. Negative for claudication, cyanosis and syncope.   Skin:  Positive for poor wound healing. Negative for color change, dry skin, nail changes, rash, suspicious lesions and unusual hair distribution.   Musculoskeletal:  Negative for falls, joint pain, joint swelling, muscle cramps, muscle weakness and stiffness.   Gastrointestinal:  Negative for constipation, diarrhea, nausea and vomiting.   Neurological:  Positive for numbness, paresthesias and sensory change. Negative for brief paralysis, disturbances in coordination, focal weakness and tremors.           Objective:      Physical Exam  Constitutional:       General: He is not in acute distress.     Appearance: He is well-developed. He is not diaphoretic.   Cardiovascular:      Pulses:           Popliteal pulses are 2+ on the right side and 2+ on the left side.        Dorsalis pedis pulses are 2+ on the right side and 2+ on the left side.        Posterior tibial pulses are 2+ on the right side and 2+ on the left side.      Comments: Capillary refill 3 seconds all toes/distal feet, all toes/both feet warm to touch.      Negative lymphadenopathy bilateral popliteal fossa and tarsal tunnel.      Negavie lower extremity edema bilateral.    Musculoskeletal:      Right ankle: No swelling, deformity, ecchymosis or lacerations. Normal range of motion. Normal pulse.       Right Achilles Tendon: Normal. No defects. Bass's test negative.      Comments: Normal angle, base, station of gait. All ten toes without clubbing, cyanosis, or signs of ischemia.  No pain to palpation bilateral lower extremities.  Range of motion, stability, muscle strength, and muscle tone normal bilateral feet and legs.    Lymphadenopathy:      Lower Body: No right inguinal adenopathy. No left inguinal adenopathy.      Comments: Negative lymphadenopathy bilateral popliteal fossa and tarsal tunnel.    Negative lymphangitic streaking bilateral feet/ankles/legs.   Skin:     General: Skin is warm and dry.      Capillary Refill: Capillary refill takes 2 to 3 seconds.      Coloration: Skin is not pale.      Findings: No abrasion, bruising, burn, ecchymosis, erythema, laceration, lesion or rash.      Nails: There is no clubbing.      Comments:   Wounds with a plantar medial right hallux in the posteromedial right heel are both closed today without open skin drainage pus tracking fluctuance malodor signs of infection.         Neurological:      Mental Status: He is alert and oriented to person, place, and time.      Sensory: No sensory deficit.      Motor: No tremor, atrophy or abnormal muscle tone.      Gait: Gait normal.      Deep Tendon Reflexes:      Reflex Scores:       Patellar reflexes are 2+ on the right side and 2+ on the left side.       Achilles reflexes are 2+ on the right side and 2+ on the left side.  Psychiatric:         Behavior: Behavior is cooperative.           Assessment:       Encounter Diagnoses   Name Primary?    Type II diabetes mellitus with neurological manifestations Yes    Type II diabetes mellitus with peripheral circulatory disorder          Plan:       Wale was seen today for wound check.    Diagnoses and all orders for this visit:    Type II diabetes mellitus with neurological manifestations    Type II diabetes mellitus with peripheral circulatory disorder      I counseled the  patient on his conditions, their implications and medical management.        The patient has received literature on basic diabetic foot care.  Patient will inspect feet daily, wear protective shoe gear when ambulatory, and apply moisturizer to skin as needed to maintain elasticity and help prevent ulceration.    Inspect feet multiple times daily for signs of occurrence/recurrence ulceration.    Rx DM shoes, inserts          No follow-ups on file.